# Patient Record
Sex: FEMALE | Race: WHITE | NOT HISPANIC OR LATINO | URBAN - METROPOLITAN AREA
[De-identification: names, ages, dates, MRNs, and addresses within clinical notes are randomized per-mention and may not be internally consistent; named-entity substitution may affect disease eponyms.]

---

## 2017-01-03 ENCOUNTER — OFFICE VISIT (OUTPATIENT)
Dept: INTERNAL MEDICINE | Facility: CLINIC | Age: 52
End: 2017-01-03

## 2017-01-03 ENCOUNTER — TELEPHONE (OUTPATIENT)
Dept: INTERNAL MEDICINE | Facility: CLINIC | Age: 52
End: 2017-01-03

## 2017-01-03 ENCOUNTER — APPOINTMENT (OUTPATIENT)
Dept: WOMENS IMAGING | Facility: HOSPITAL | Age: 52
End: 2017-01-03

## 2017-01-03 VITALS
WEIGHT: 173 LBS | TEMPERATURE: 98.9 F | SYSTOLIC BLOOD PRESSURE: 122 MMHG | BODY MASS INDEX: 31.83 KG/M2 | OXYGEN SATURATION: 97 % | DIASTOLIC BLOOD PRESSURE: 78 MMHG | HEART RATE: 79 BPM | HEIGHT: 62 IN

## 2017-01-03 DIAGNOSIS — J20.9 ACUTE BRONCHITIS, UNSPECIFIED ORGANISM: ICD-10-CM

## 2017-01-03 DIAGNOSIS — R05.9 COUGH: Primary | ICD-10-CM

## 2017-01-03 PROCEDURE — 71020 XR CHEST 2 VW: CPT | Performed by: NURSE PRACTITIONER

## 2017-01-03 PROCEDURE — 71020 CHG CHEST X-RAY 2 VW: CPT | Performed by: RADIOLOGY

## 2017-01-03 PROCEDURE — 99213 OFFICE O/P EST LOW 20 MIN: CPT | Performed by: NURSE PRACTITIONER

## 2017-01-03 RX ORDER — PROMETHAZINE HYDROCHLORIDE AND CODEINE PHOSPHATE 6.25; 1 MG/5ML; MG/5ML
5 SYRUP ORAL EVERY 4 HOURS PRN
Qty: 118 ML | Refills: 0 | Status: SHIPPED | OUTPATIENT
Start: 2017-01-03 | End: 2017-01-08

## 2017-01-03 RX ORDER — AZITHROMYCIN 250 MG/1
TABLET, FILM COATED ORAL
Qty: 6 TABLET | Refills: 0 | Status: SHIPPED | OUTPATIENT
Start: 2017-01-03 | End: 2017-02-14

## 2017-01-03 RX ORDER — GUAIFENESIN 600 MG/1
1200 TABLET, EXTENDED RELEASE ORAL 2 TIMES DAILY
Qty: 14 TABLET
Start: 2017-01-03 | End: 2017-01-10

## 2017-01-03 NOTE — MR AVS SNAPSHOT
Josefa Nazario   1/3/2017 9:15 AM   Office Visit    Dept Phone:  [phone]   Encounter #:  [id number]    Provider:  EVELINA Smith   Department:  [facility] INTERNAL MEDICINE                Your Full Care Plan              Today's Medication Changes          These changes are accurate as of: 1/3/17 10:08 AM.  If you have any questions, ask your nurse or doctor.               New Medication(s)Ordered:     azithromycin 250 MG tablet   Commonly known as:  ZITHROMAX Z-SEVERO   Take 2 tablets the first day, then 1 tablet daily for 4 days.   Started by:  EVELINA Smith       guaiFENesin 600 MG 12 hr tablet   Commonly known as:  MUCINEX   Take 2 tablets by mouth 2 (Two) Times a Day for 7 days.   Started by:  EVELINA Smith       promethazine-codeine 6.25-10 MG/5ML syrup   Commonly known as:  PHENERGAN with CODEINE   Take 5 mL by mouth Every 4 (Four) Hours As Needed for cough for up to 5 days.   Started by:  EVELINA Smith         Medication(s)that have changed:     escitalopram 20 MG tablet   Commonly known as:  LEXAPRO   Take 1 tablet by mouth daily.   What changed:  Another medication with the same name was removed. Continue taking this medication, and follow the directions you see here.   Changed by:  Flora Hillman MA         Stop taking medication(s)listed here:     triamcinolone 0.1 % cream   Commonly known as:  KENALOG   Stopped by:  EVELINA Smith           VANIQA 13.9 % cream   Generic drug:  Eflornithine HCl   Stopped by:  EVELINA Smith                Where to Get Your Medications      These medications were sent to [address] [facility] [address] [facility] [address] [facility] [address] - [phone]  - [phone]   [address] [facility] [address]     Phone:  [phone]     azithromycin 250 MG tablet         You can get these medications from any pharmacy     Bring a paper prescription  for each of these medications     promethazine-codeine 6.25-10 MG/5ML syrup         Information about where to get these medications is not yet available     ! Ask your nurse or doctor about these medications     guaiFENesin 600 MG 12 hr tablet                  Your Updated Medication List          This list is accurate as of: 1/3/17 10:08 AM.  Always use your most recent med list.                amphetamine-dextroamphetamine XR 15 MG 24 hr capsule   Commonly known as:  ADDERALL XR   Take 1 capsule by mouth Daily.       azithromycin 250 MG tablet   Commonly known as:  ZITHROMAX Z-SEVERO   Take 2 tablets the first day, then 1 tablet daily for 4 days.       escitalopram 20 MG tablet   Commonly known as:  LEXAPRO   Take 1 tablet by mouth daily.       guaiFENesin 600 MG 12 hr tablet   Commonly known as:  MUCINEX   Take 2 tablets by mouth 2 (Two) Times a Day for 7 days.       promethazine-codeine 6.25-10 MG/5ML syrup   Commonly known as:  PHENERGAN with CODEINE   Take 5 mL by mouth Every 4 (Four) Hours As Needed for cough for up to 5 days.       SYNTHROID 137 MCG tablet   Generic drug:  levothyroxine   TAKE 1 TABLET BY MOUTH DAILY.               We Performed the Following     XR Chest 2 View       You Were Diagnosed With        Codes Comments    Cough    -  Primary ICD-10-CM: R05  ICD-9-CM: 786.2     Acute bronchitis, unspecified organism     ICD-10-CM: J20.9  ICD-9-CM: 466.0       Instructions     None    Patient Instructions History      Upcoming Appointments     Visit Type Date Time Department    ACUTE           1/3/2017  9:15 AM [employer]    FOLLOW UP 1/17/2017  9:00 AM [employer] Signup     [employer] allows you to send messages to your doctor, view your test results, renew your prescriptions, schedule appointments, and more. To sign up, go to [employer] and click on the Sign Up Now link in the New User? box. Enter your [employer] Activation Code exactly as it appears below  "along with the last four digits of your Social Security Number and your Date of Birth () to complete the sign-up process. If you do not sign up before the expiration date, you must request a new code.    [employer] Activation Code: [email or web address]  Expires: 2017 10:07 AM    If you have questions, you can email [email or web address] or call [phone] to talk to our [employer] staff. Remember, [employer] is NOT to be used for urgent needs. For medical emergencies, dial 911.               Other Info from Your Visit           Your Appointments     2017  9:00 AM EST   Follow Up with Julia Rodriguez MD   [facility] INTERNAL MEDICINE (--)    [address] [place] [id number] [phone]           Arrive 15 minutes prior to appointment.              Allergies     No Known Allergies      Reason for Visit     URI x 2 days    Cough           Vital Signs     Blood Pressure Pulse Temperature Height Weight Last Menstrual Period    122/78 (BP Location: Left arm, Patient Position: Sitting, Cuff Size: Adult) 79 98.9 °F (37.2 °C) (Oral) 62\" (157.5 cm) 173 lb (78.5 kg) 2016    Oxygen Saturation Body Mass Index Smoking Status             97% 31.64 kg/m2 Never Smoker         Problems and Diagnoses Noted     Cough    -  Primary    Acute bronchitis          Results     XR Chest 2 View               "

## 2017-01-03 NOTE — PROGRESS NOTES
Subjective   Josefa Nazario is a 51 y.o. female who presents due to respiratory symptoms.    URI    The current episode started in the past 7 days. The problem has been gradually worsening. There has been no fever. Associated symptoms include chest pain, congestion, coughing, headaches, rhinorrhea, sinus pain, sneezing and a sore throat. Pertinent negatives include no abdominal pain, diarrhea, dysuria, ear pain, nausea, vomiting or wheezing. Treatments tried: Nyquil, Robitussin DM. The treatment provided mild relief.   Cough   The cough is non-productive. Associated symptoms include chest pain, headaches, postnasal drip, rhinorrhea and a sore throat. Pertinent negatives include no chills, ear pain, fever, shortness of breath or wheezing.        The following portions of the patient's history were reviewed and updated as appropriate: allergies, current medications, past social history and problem list.    Past Medical History   Diagnosis Date   • Anxiety    • Attention deficit disorder    • Encounter for medication management    • Heart murmur    • Hypothyroidism    • Low back pain          Current Outpatient Prescriptions:   •  amphetamine-dextroamphetamine XR (ADDERALL XR) 15 MG 24 hr capsule, Take 1 capsule by mouth Daily., Disp: 30 capsule, Rfl: 0  •  escitalopram (LEXAPRO) 20 MG tablet, Take 1 tablet by mouth daily., Disp: 30 tablet, Rfl: 5  •  SYNTHROID 137 MCG tablet, TAKE 1 TABLET BY MOUTH DAILY., Disp: 90 tablet, Rfl: 1  •  azithromycin (ZITHROMAX Z-SEVERO) 250 MG tablet, Take 2 tablets the first day, then 1 tablet daily for 4 days., Disp: 6 tablet, Rfl: 0  •  guaiFENesin (MUCINEX) 600 MG 12 hr tablet, Take 2 tablets by mouth 2 (Two) Times a Day for 7 days., Disp: 14 tablet, Rfl:   •  promethazine-codeine (PHENERGAN with CODEINE) 6.25-10 MG/5ML syrup, Take 5 mL by mouth Every 4 (Four) Hours As Needed for cough for up to 5 days., Disp: 118 mL, Rfl: 0    No Known Allergies    Review of Systems  "  Constitutional: Positive for fatigue. Negative for activity change, appetite change, chills, fever and unexpected weight change.   HENT: Positive for congestion, postnasal drip, rhinorrhea, sinus pressure, sneezing and sore throat. Negative for drooling, ear pain, facial swelling, hearing loss, mouth sores, nosebleeds, trouble swallowing and voice change.    Eyes: Negative for pain, discharge, itching and visual disturbance.   Respiratory: Positive for cough and chest tightness. Negative for choking, shortness of breath and wheezing.    Cardiovascular: Positive for chest pain. Negative for palpitations.   Gastrointestinal: Negative for abdominal pain, constipation, diarrhea, nausea and vomiting.   Endocrine: Negative for polyuria.   Genitourinary: Negative for dysuria and frequency.   Musculoskeletal: Negative for back pain and joint swelling.   Neurological: Positive for headaches.       Objective   Vitals:    01/03/17 0904   BP: 122/78   BP Location: Left arm   Patient Position: Sitting   Cuff Size: Adult   Pulse: 79   Temp: 98.9 °F (37.2 °C)   TempSrc: Oral   SpO2: 97%   Weight: 173 lb (78.5 kg)   Height: 62\" (157.5 cm)     Physical Exam   Constitutional: She appears well-developed and well-nourished. She is cooperative. She does not have a sickly appearance. She does not appear ill.   HENT:   Head: Normocephalic.   Right Ear: Hearing and external ear normal. No drainage, swelling or tenderness. Tympanic membrane is bulging. Tympanic membrane is not erythematous. No middle ear effusion. No decreased hearing is noted.   Left Ear: Hearing and external ear normal. No drainage, swelling or tenderness. Tympanic membrane is bulging. Tympanic membrane is not erythematous.  No middle ear effusion. No decreased hearing is noted.   Nose: Nose normal. No mucosal edema, rhinorrhea, sinus tenderness or nasal deformity. Right sinus exhibits no maxillary sinus tenderness and no frontal sinus tenderness. Left sinus exhibits no " maxillary sinus tenderness and no frontal sinus tenderness.   Mouth/Throat: Mucous membranes are normal. Posterior oropharyngeal erythema present.   Eyes: Conjunctivae and lids are normal.   Neck: Trachea normal.   Cardiovascular: Regular rhythm, normal heart sounds and normal pulses.    No murmur heard.  Pulmonary/Chest: No respiratory distress. She has no decreased breath sounds. She has wheezes. She has no rhonchi. She has no rales.   +expiratory wheezes throughout lung fields   Lymphadenopathy:     She has no cervical adenopathy.   Neurological: She is alert.   Skin: Skin is warm, dry and intact.   Nursing note and vitals reviewed.      Assessment/Plan   Josefa was seen today for uri and cough.    Diagnoses and all orders for this visit:    Cough  Comments:  no acute abnl on CXR, will send for review. Will treat for bronchitis and continue to monitor sx  Orders:  -     XR Chest 2 View    Acute bronchitis, unspecified organism  -     guaiFENesin (MUCINEX) 600 MG 12 hr tablet; Take 2 tablets by mouth 2 (Two) Times a Day for 7 days.  -     azithromycin (ZITHROMAX Z-SEVERO) 250 MG tablet; Take 2 tablets the first day, then 1 tablet daily for 4 days.  -     promethazine-codeine (PHENERGAN with CODEINE) 6.25-10 MG/5ML syrup; Take 5 mL by mouth Every 4 (Four) Hours As Needed for cough for up to 5 days.

## 2017-01-03 NOTE — TELEPHONE ENCOUNTER
----- Message from Mac Ibrahim sent at 1/3/2017 11:33 AM EST -----  Contact: pt  Pt calling, was in today. She was prescribed Phenergan ,z-mak and mucinex. Pt says she was supposed to be prescribed an antibiotic. Informed her that the z-mak is an antibiotic and was already sent into the pharmacy. Pt then says she thought she was supposed to have a steroid and an antibiotic. Pt wants to be sure all prescriptions were called in. Please advise.     #[phone]

## 2017-02-14 ENCOUNTER — OFFICE VISIT (OUTPATIENT)
Dept: INTERNAL MEDICINE | Facility: CLINIC | Age: 52
End: 2017-02-14

## 2017-02-14 VITALS
SYSTOLIC BLOOD PRESSURE: 120 MMHG | HEIGHT: 62 IN | WEIGHT: 171 LBS | RESPIRATION RATE: 14 BRPM | BODY MASS INDEX: 31.47 KG/M2 | DIASTOLIC BLOOD PRESSURE: 80 MMHG

## 2017-02-14 DIAGNOSIS — F98.8 ATTENTION DEFICIT DISORDER: ICD-10-CM

## 2017-02-14 DIAGNOSIS — E03.9 HYPOTHYROIDISM (ACQUIRED): Primary | ICD-10-CM

## 2017-02-14 LAB
ALBUMIN SERPL-MCNC: 3.7 G/DL (ref 3.4–4.6)
ALBUMIN/GLOB SERPL: 1.1 G/DL
ALP SERPL-CCNC: 84 U/L (ref 46–116)
ALT SERPL W P-5'-P-CCNC: 25 U/L (ref 14–59)
ANION GAP SERPL CALCULATED.3IONS-SCNC: 9 MMOL/L
AST SERPL-CCNC: 22 U/L (ref 7–37)
BASOPHILS # BLD AUTO: 0.02 10*3/MM3 (ref 0–0.2)
BASOPHILS NFR BLD AUTO: 0.3 % (ref 0–2)
BILIRUB SERPL-MCNC: 0.4 MG/DL (ref 0.2–1)
BUN BLD-MCNC: 7 MG/DL (ref 6–22)
BUN/CREAT SERPL: 10 (ref 7–25)
CALCIUM SPEC-SCNC: 8.7 MG/DL (ref 8.6–10.5)
CHLORIDE SERPL-SCNC: 104 MMOL/L (ref 95–107)
CHOLEST SERPL-MCNC: 215 MG/DL (ref 0–200)
CO2 SERPL-SCNC: 27 MMOL/L (ref 23–32)
CREAT BLD-MCNC: 0.7 MG/DL (ref 0.55–1.02)
DEPRECATED RDW RBC AUTO: 41.7 FL (ref 37–54)
EOSINOPHIL # BLD AUTO: 0.1 10*3/MM3 (ref 0–0.7)
EOSINOPHIL NFR BLD AUTO: 1.6 % (ref 0–5)
ERYTHROCYTE [DISTWIDTH] IN BLOOD BY AUTOMATED COUNT: 12.7 % (ref 11.5–15)
GFR SERPL CREATININE-BSD FRML MDRD: 88 ML/MIN/1.73
GLOBULIN UR ELPH-MCNC: 3.3 GM/DL
GLUCOSE BLD-MCNC: 83 MG/DL (ref 70–100)
HCT VFR BLD AUTO: 36.9 % (ref 34.1–44.9)
HDLC SERPL-MCNC: 61 MG/DL (ref 40–81)
HGB BLD-MCNC: 12 G/DL (ref 11.2–15.7)
LDLC SERPL CALC-MCNC: 128 MG/DL (ref 0–100)
LDLC/HDLC SERPL: 2.1 {RATIO}
LYMPHOCYTES # BLD AUTO: 1.83 10*3/MM3 (ref 0.8–7)
LYMPHOCYTES NFR BLD AUTO: 29.5 % (ref 10–60)
MCH RBC QN AUTO: 29.6 PG (ref 26–34)
MCHC RBC AUTO-ENTMCNC: 32.5 G/DL (ref 31–37)
MCV RBC AUTO: 90.9 FL (ref 80–100)
MONOCYTES # BLD AUTO: 0.47 10*3/MM3 (ref 0–1)
MONOCYTES NFR BLD AUTO: 7.6 % (ref 0–13)
NEUTROPHILS # BLD AUTO: 3.78 10*3/MM3 (ref 1–11)
NEUTROPHILS NFR BLD AUTO: 61 % (ref 30–85)
PLATELET # BLD AUTO: 205 10*3/MM3 (ref 150–450)
PMV BLD AUTO: 11.5 FL (ref 6–12)
POTASSIUM BLD-SCNC: 4.6 MMOL/L (ref 3.3–5.3)
PROT SERPL-MCNC: 7 G/DL (ref 6.3–8.4)
RBC # BLD AUTO: 4.06 10*6/MM3 (ref 3.93–5.22)
SODIUM BLD-SCNC: 140 MMOL/L (ref 136–145)
T4 FREE SERPL-MCNC: 0.86 NG/DL (ref 0.93–1.7)
TRIGL SERPL-MCNC: 128 MG/DL (ref 0–150)
TSH SERPL DL<=0.05 MIU/L-ACNC: 3.21 MIU/ML (ref 0.4–4.2)
VLDLC SERPL-MCNC: 25.6 MG/DL
WBC NRBC COR # BLD: 6.2 10*3/MM3 (ref 5–10)

## 2017-02-14 PROCEDURE — 80053 COMPREHEN METABOLIC PANEL: CPT | Performed by: INTERNAL MEDICINE

## 2017-02-14 PROCEDURE — 80061 LIPID PANEL: CPT | Performed by: INTERNAL MEDICINE

## 2017-02-14 PROCEDURE — 84443 ASSAY THYROID STIM HORMONE: CPT | Performed by: INTERNAL MEDICINE

## 2017-02-14 PROCEDURE — 85025 COMPLETE CBC W/AUTO DIFF WBC: CPT | Performed by: INTERNAL MEDICINE

## 2017-02-14 PROCEDURE — 99213 OFFICE O/P EST LOW 20 MIN: CPT | Performed by: INTERNAL MEDICINE

## 2017-02-14 PROCEDURE — 36415 COLL VENOUS BLD VENIPUNCTURE: CPT | Performed by: INTERNAL MEDICINE

## 2017-02-14 RX ORDER — DEXTROAMPHETAMINE SACCHARATE, AMPHETAMINE ASPARTATE MONOHYDRATE, DEXTROAMPHETAMINE SULFATE AND AMPHETAMINE SULFATE 5; 5; 5; 5 MG/1; MG/1; MG/1; MG/1
20 CAPSULE, EXTENDED RELEASE ORAL EVERY MORNING
Qty: 30 CAPSULE | Refills: 0 | Status: SHIPPED | OUTPATIENT
Start: 2017-02-14 | End: 2017-03-24 | Stop reason: SDUPTHER

## 2017-02-14 NOTE — PROGRESS NOTES
"Chief Complaint   Patient presents with   • Hypothyroidism     4 month check up    • Med Refill     needs adderall        Subjective   Josefa Nazario is a 51 y.o. female     Hypothyroidism   Pertinent negatives include no chest pain, coughing or headaches.   :      Hypothyroidism: Current treatment levothyroxine. She is compliant with the medication, tolerates it well and reports good control of symptoms of hypothyroidism.  She has not noted any change in tolerance of heat or cold.  No change in hair or skin.  No constipation.  No symptoms of hyperthyroidism.      ADD:  She continues to take adderall xr.  She feels it may wear out by the end of a long day and so she has been taking it between 10 and 12.      The following portions of the patient's history were reviewed and updated as appropriate: allergies, current medications, past social history, problem list, past surgical history    Review of Systems   Constitutional: Negative for activity change, appetite change and unexpected weight change.   HENT: Negative for nosebleeds.    Eyes: Negative for visual disturbance.   Respiratory: Negative for cough and shortness of breath.    Cardiovascular: Negative for chest pain, palpitations and leg swelling.   Neurological: Negative for headaches.   Psychiatric/Behavioral: Positive for sleep disturbance.       Visit Vitals   • /80 (BP Location: Right arm, Patient Position: Sitting, Cuff Size: Adult)   • Resp 14   • Ht 62\" (157.5 cm)   • Wt 171 lb (77.6 kg)   • BMI 31.28 kg/m2        Objective   Physical Exam   Constitutional: She is oriented to person, place, and time. She appears well-developed and well-nourished. No distress.   Neck: Normal carotid pulses present. Carotid bruit is not present.   Cardiovascular: Normal rate, regular rhythm, S1 normal, S2 normal and intact distal pulses.  Exam reveals no gallop and no friction rub.    No murmur heard.  Pulses:       Carotid pulses are 2+ on the right side, and 2+ " on the left side.  Pulmonary/Chest: Effort normal and breath sounds normal. No respiratory distress. She has no wheezes. She has no rales. Chest wall is not dull to percussion.   Musculoskeletal: She exhibits no edema.   Neurological: She is alert and oriented to person, place, and time.   Skin: Skin is warm and dry.   Psychiatric: Her speech is rapid and/or pressured.   Nursing note and vitals reviewed.      Assessment/Plan   Problem List Items Addressed This Visit        Endocrine    Hypothyroidism (acquired) - Primary    Relevant Orders    TSH (Completed)    T3, Free    T4, Free    Comprehensive Metabolic Panel (Completed)    CBC & Differential (Completed)    Lipid Panel (Completed)    CBC Auto Differential (Completed)       Other    Attention deficit disorder    Relevant Medications    amphetamine-dextroamphetamine XR (ADDERALL XR) 20 MG 24 hr capsule

## 2017-02-15 LAB — T3FREE SERPL-MCNC: 2.6 PG/ML (ref 2–4.4)

## 2017-02-23 DIAGNOSIS — M54.42 LEFT-SIDED LOW BACK PAIN WITH LEFT-SIDED SCIATICA: ICD-10-CM

## 2017-02-23 DIAGNOSIS — E03.9 HYPOTHYROIDISM: ICD-10-CM

## 2017-02-23 RX ORDER — MELOXICAM 15 MG/1
15 TABLET ORAL DAILY
Qty: 30 TABLET | Refills: 6 | Status: SHIPPED | OUTPATIENT
Start: 2017-02-23 | End: 2018-10-07 | Stop reason: SDUPTHER

## 2017-02-23 RX ORDER — LEVOTHYROXINE SODIUM 137 MCG
TABLET ORAL
Qty: 90 TABLET | Refills: 1 | Status: SHIPPED | OUTPATIENT
Start: 2017-02-23 | End: 2017-08-20 | Stop reason: SDUPTHER

## 2017-03-24 RX ORDER — DEXTROAMPHETAMINE SACCHARATE, AMPHETAMINE ASPARTATE MONOHYDRATE, DEXTROAMPHETAMINE SULFATE AND AMPHETAMINE SULFATE 5; 5; 5; 5 MG/1; MG/1; MG/1; MG/1
20 CAPSULE, EXTENDED RELEASE ORAL EVERY MORNING
Qty: 30 CAPSULE | Refills: 0 | Status: SHIPPED | OUTPATIENT
Start: 2017-03-24 | End: 2017-05-17 | Stop reason: SDUPTHER

## 2017-03-24 NOTE — TELEPHONE ENCOUNTER
----- Message from Suzanne Espinal sent at 3/24/2017  2:13 PM EDT -----  Contact: Patient  Patient called needing refill on     amphetamine-dextroamphetamine XR (ADDERALL XR) 20 MG 24 hr capsule    Please call when ready to be picked up.      Patient:  [phone]    Thanks.

## 2017-03-27 ENCOUNTER — TELEPHONE (OUTPATIENT)
Dept: INTERNAL MEDICINE | Facility: CLINIC | Age: 52
End: 2017-03-27

## 2017-03-30 ENCOUNTER — OFFICE VISIT (OUTPATIENT)
Dept: INTERNAL MEDICINE | Facility: CLINIC | Age: 52
End: 2017-03-30

## 2017-03-30 VITALS
SYSTOLIC BLOOD PRESSURE: 108 MMHG | BODY MASS INDEX: 30.55 KG/M2 | DIASTOLIC BLOOD PRESSURE: 82 MMHG | HEIGHT: 62 IN | OXYGEN SATURATION: 96 % | WEIGHT: 166 LBS | HEART RATE: 76 BPM

## 2017-03-30 DIAGNOSIS — J20.9 ACUTE BRONCHITIS, UNSPECIFIED ORGANISM: Primary | ICD-10-CM

## 2017-03-30 DIAGNOSIS — B27.90 INFECTIOUS MONONUCLEOSIS WITHOUT COMPLICATION, INFECTIOUS MONONUCLEOSIS DUE TO UNSPECIFIED ORGANISM: ICD-10-CM

## 2017-03-30 LAB
EXPIRATION DATE: NORMAL
HETEROPH AB SER QL LA: POSITIVE
INTERNAL CONTROL: NORMAL
Lab: NORMAL
S PYO AG THROAT QL: NEGATIVE

## 2017-03-30 PROCEDURE — 86308 HETEROPHILE ANTIBODY SCREEN: CPT | Performed by: INTERNAL MEDICINE

## 2017-03-30 PROCEDURE — 87880 STREP A ASSAY W/OPTIC: CPT | Performed by: INTERNAL MEDICINE

## 2017-03-30 PROCEDURE — 99214 OFFICE O/P EST MOD 30 MIN: CPT | Performed by: INTERNAL MEDICINE

## 2017-03-30 NOTE — PROGRESS NOTES
Subjective   Josefa Nazario is a 51 y.o. female.     Sore Throat    This is a new problem. The current episode started in the past 7 days. Associated symptoms include coughing ( Went to [facility] Tues & was told bronchiti Strated on levaquin & tessalon Low grade fever a couple days ago but none now) and shortness of breath.        The following portions of the patient's history were reviewed and updated as appropriate: allergies, current medications, past family history, past medical history, past social history, past surgical history and problem list.    Review of Systems   HENT: Positive for sore throat ( sore throat has gotten wors over past several day  Can hardly swallow now).    Respiratory: Positive for cough ( Went to [facility] Tues & was told bronchiti Strated on levaquin & tessalon Low grade fever a couple days ago but none now), shortness of breath and wheezing.    Cardiovascular: Negative for chest pain and palpitations.   Gastrointestinal: Negative.    Genitourinary: Negative.        Objective   Physical Exam   Constitutional: She appears well-developed.   HENT:   Head: Normocephalic.   Mouth/Throat: Oropharynx is clear and moist.   Tonsils edematous But no exudates or erythema   Eyes: EOM are normal.   Cardiovascular: Normal rate, regular rhythm and normal heart sounds.    Pulmonary/Chest: Effort normal and breath sounds normal.   Scattered rhonchi   Musculoskeletal: Normal range of motion.   Lymphadenopathy:     She has no cervical adenopathy.   Neurological: She is alert.   Vitals reviewed.      Assessment/Plan   Josefa was seen today for sore throat.    Diagnoses and all orders for this visit:    Acute bronchitis, unspecified organism  -     POCT rapid strep A  -     Mononucleosis Screen; Future  -     Mononucleosis Screen    Infectious mononucleosis without complication, infectious mononucleosis due to unspecified organism    Advised on sympyomatic care

## 2017-04-14 ENCOUNTER — OFFICE VISIT (OUTPATIENT)
Dept: INTERNAL MEDICINE | Facility: CLINIC | Age: 52
End: 2017-04-14

## 2017-04-14 ENCOUNTER — APPOINTMENT (OUTPATIENT)
Dept: WOMENS IMAGING | Facility: HOSPITAL | Age: 52
End: 2017-04-14

## 2017-04-14 VITALS
DIASTOLIC BLOOD PRESSURE: 74 MMHG | SYSTOLIC BLOOD PRESSURE: 112 MMHG | WEIGHT: 168 LBS | BODY MASS INDEX: 30.91 KG/M2 | RESPIRATION RATE: 14 BRPM | HEIGHT: 62 IN

## 2017-04-14 DIAGNOSIS — B27.90 INFECTIOUS MONONUCLEOSIS WITHOUT COMPLICATION, INFECTIOUS MONONUCLEOSIS DUE TO UNSPECIFIED ORGANISM: ICD-10-CM

## 2017-04-14 DIAGNOSIS — R05.9 COUGH: Primary | ICD-10-CM

## 2017-04-14 PROCEDURE — 99213 OFFICE O/P EST LOW 20 MIN: CPT | Performed by: INTERNAL MEDICINE

## 2017-04-14 PROCEDURE — 71020 XR CHEST PA AND LATERAL: CPT | Performed by: INTERNAL MEDICINE

## 2017-04-14 PROCEDURE — 71020 CHG CHEST X-RAY 2 VW: CPT | Performed by: RADIOLOGY

## 2017-04-14 RX ORDER — DEXTROMETHORPHAN HYDROBROMIDE AND PROMETHAZINE HYDROCHLORIDE 15; 6.25 MG/5ML; MG/5ML
5 SYRUP ORAL 4 TIMES DAILY PRN
Qty: 189 ML | Refills: 0 | Status: SHIPPED | OUTPATIENT
Start: 2017-04-14 | End: 2017-06-25

## 2017-04-14 NOTE — PROGRESS NOTES
"Chief Complaint   Patient presents with   • Follow-up     2 week follow up patient tested positive for mono   • Cough     patient still coughing , urgent told patient she needs repeat chest xray today         Subjective   Josefa Nazario is a 51 y.o. female     HPI: She was seen here about 2 weeks ago for a severe sore throat, cough.  Diagnosed with mono.  Her sore throat has now resolved.  She still has a cough.  She has some fatigue.  She's had a sharp discomfort off and on in the left flank area.  Her appetite is improving.  She has no fevers, chills, sweats.  She has a headache off and on.  She takes Advil for this and it helps.  For her cough, she has been taking Mucinex DM and Delsym.  They help a little bit but she still has problems with a cough.  Prior to this, she went to an immediate care center for cough.  A chest x-ray showed blunting in the costophrenic angles.  She was told that she had fluid in her lungs and she needed to repeat the chest x-ray.         The following portions of the patient's history were reviewed and updated as appropriate: allergies, current medications, past social history, problem list, past surgical history    Review of Systems   Constitutional: Negative for appetite change and fever.   HENT: Negative for ear pain, postnasal drip, rhinorrhea and sinus pressure.    Respiratory: Positive for cough. Negative for shortness of breath and wheezing.    Cardiovascular: Negative for palpitations and leg swelling.   Gastrointestinal: Negative for abdominal pain, diarrhea, nausea and vomiting.   Hematological: Negative for adenopathy.       /74 (BP Location: Left arm, Patient Position: Sitting, Cuff Size: Adult)  Resp 14  Ht 62\" (157.5 cm)  Wt 168 lb (76.2 kg)  LMP 03/07/2017  BMI 30.73 kg/m2     Objective   Physical Exam   Constitutional: She is oriented to person, place, and time. She appears well-developed and well-nourished. No distress.   Neck: Normal carotid pulses " present. Carotid bruit is not present.   Cardiovascular: Normal rate, regular rhythm, S1 normal, S2 normal and intact distal pulses.  Exam reveals no gallop and no friction rub.    No murmur heard.  Pulses:       Carotid pulses are 2+ on the right side, and 2+ on the left side.  Pulmonary/Chest: Effort normal and breath sounds normal. No respiratory distress. She has no wheezes. She has no rhonchi. She has no rales. Chest wall is not dull to percussion.   Abdominal: Soft. Bowel sounds are normal. There is no hepatosplenomegaly. There is no tenderness. There is no rebound and no guarding.   Musculoskeletal: She exhibits no edema.   Lymphadenopathy:     She has no cervical adenopathy.   Neurological: She is alert and oriented to person, place, and time.   Skin: Skin is warm and dry.   Nursing note and vitals reviewed.      Assessment/Plan   Problem List Items Addressed This Visit     None      Visit Diagnoses     Cough    -  Primary    Relevant Orders    XR Chest PA & Lateral (Completed)    Infectious mononucleosis without complication, infectious mononucleosis due to unspecified organism        Her symptoms have improved significantly.          Chest x-ray reviewed.  No old films available for comparison.  Costophrenic angles appear sharp.  Sent for radiology over read.

## 2017-05-18 RX ORDER — DEXTROAMPHETAMINE SACCHARATE, AMPHETAMINE ASPARTATE MONOHYDRATE, DEXTROAMPHETAMINE SULFATE AND AMPHETAMINE SULFATE 5; 5; 5; 5 MG/1; MG/1; MG/1; MG/1
20 CAPSULE, EXTENDED RELEASE ORAL EVERY MORNING
Qty: 30 CAPSULE | Refills: 0 | Status: SHIPPED | OUTPATIENT
Start: 2017-05-18 | End: 2017-06-23 | Stop reason: SDUPTHER

## 2017-06-06 ENCOUNTER — APPOINTMENT (OUTPATIENT)
Dept: WOMENS IMAGING | Facility: HOSPITAL | Age: 52
End: 2017-06-06

## 2017-06-06 PROCEDURE — 77067 SCR MAMMO BI INCL CAD: CPT | Performed by: RADIOLOGY

## 2017-06-23 RX ORDER — DEXTROAMPHETAMINE SACCHARATE, AMPHETAMINE ASPARTATE MONOHYDRATE, DEXTROAMPHETAMINE SULFATE AND AMPHETAMINE SULFATE 5; 5; 5; 5 MG/1; MG/1; MG/1; MG/1
20 CAPSULE, EXTENDED RELEASE ORAL EVERY MORNING
Qty: 30 CAPSULE | Refills: 0 | Status: SHIPPED | OUTPATIENT
Start: 2017-06-23 | End: 2017-08-18 | Stop reason: SDUPTHER

## 2017-06-23 NOTE — TELEPHONE ENCOUNTER
----- Message from Katt Reed MA sent at 6/23/2017 11:50 AM EDT -----  Pt calling for refill    Adderall XR    lov 4/14  Next appt is not scheduled      Pt#[phone]

## 2017-06-27 ENCOUNTER — TELEPHONE (OUTPATIENT)
Dept: INTERNAL MEDICINE | Facility: CLINIC | Age: 52
End: 2017-06-27

## 2017-08-16 ENCOUNTER — TELEPHONE (OUTPATIENT)
Dept: INTERNAL MEDICINE | Facility: CLINIC | Age: 52
End: 2017-08-16

## 2017-08-16 NOTE — TELEPHONE ENCOUNTER
----- Message from Mac Ibrahim sent at 8/16/2017 10:10 AM EDT -----  Contact: pt  Pt calling would like refill on rx. Please call when ready for pickup.    amphetamine-dextroamphetamine XR (ADDERALL XR) 20 MG 24 hr capsule    Pt#[phone]

## 2017-08-18 ENCOUNTER — APPOINTMENT (OUTPATIENT)
Dept: WOMENS IMAGING | Facility: HOSPITAL | Age: 52
End: 2017-08-18

## 2017-08-18 ENCOUNTER — OFFICE VISIT (OUTPATIENT)
Dept: INTERNAL MEDICINE | Facility: CLINIC | Age: 52
End: 2017-08-18

## 2017-08-18 VITALS
BODY MASS INDEX: 31.83 KG/M2 | DIASTOLIC BLOOD PRESSURE: 70 MMHG | WEIGHT: 173 LBS | SYSTOLIC BLOOD PRESSURE: 110 MMHG | HEIGHT: 62 IN

## 2017-08-18 DIAGNOSIS — F98.8 ATTENTION DEFICIT DISORDER: Primary | ICD-10-CM

## 2017-08-18 DIAGNOSIS — M79.671 RIGHT FOOT PAIN: ICD-10-CM

## 2017-08-18 PROCEDURE — 73630 X-RAY EXAM OF FOOT: CPT | Performed by: INTERNAL MEDICINE

## 2017-08-18 PROCEDURE — 99213 OFFICE O/P EST LOW 20 MIN: CPT | Performed by: INTERNAL MEDICINE

## 2017-08-18 PROCEDURE — 73630 X-RAY EXAM OF FOOT: CPT | Performed by: RADIOLOGY

## 2017-08-18 RX ORDER — DEXTROAMPHETAMINE SACCHARATE, AMPHETAMINE ASPARTATE MONOHYDRATE, DEXTROAMPHETAMINE SULFATE AND AMPHETAMINE SULFATE 5; 5; 5; 5 MG/1; MG/1; MG/1; MG/1
20 CAPSULE, EXTENDED RELEASE ORAL EVERY MORNING
Qty: 30 CAPSULE | Refills: 0 | Status: SHIPPED | OUTPATIENT
Start: 2017-08-18 | End: 2017-09-21 | Stop reason: SDUPTHER

## 2017-08-18 NOTE — PROGRESS NOTES
"Chief Complaint   Patient presents with   • Follow-up     ADD        Subjective   Josefa Nazario is a 52 y.o. female     HPI: ADD:  She comes in for follow up of ADD.  This is a chronic problem.  Symptoms include easy distractibility, poor concentration, short attention span, forgetfulness.  This happens at work.  Sometimes also at home.  Symptoms are moderate in severity.  Symptoms are unchanged.  Exacerbating factors include fatigue, distracting activities.  Relieving factors include stimulant medication.  Associated symptoms negative for anorexia, weight loss, sleep disturbance, anxiety, irritability  Current treatment stimulant medication.By report, there is good compliance with treatment, good tolerance of treatment and good symptom control.         Hypothyroidism: Current treatment levothyroxine. She is compliant with the medication, tolerates it well and reports good control of symptoms of hypothyroidism.  She has not noted any change in tolerance of heat or cold.  No change in hair or skin.  No constipation.  No symptoms of hyperthyroidism.      aboiut 6 months ago, started to notice tenderness on the side of her right foot.      The following portions of the patient's history were reviewed and updated as appropriate: allergies, current medications, past social history, problem list, past surgical history    Review of Systems   Constitutional: Negative for activity change and appetite change.   HENT: Negative for nosebleeds.    Eyes: Negative for visual disturbance.   Respiratory: Negative for cough and shortness of breath.    Cardiovascular: Negative for chest pain, palpitations and leg swelling.   Neurological: Negative for headaches.   Psychiatric/Behavioral: Negative for sleep disturbance.           Objective     /70  Ht 62.2\" (158 cm)  Wt 173 lb (78.5 kg)  BMI 31.44 kg/m2     Physical Exam   Constitutional: She is oriented to person, place, and time. She appears well-developed and " well-nourished. No distress.   Neck: Normal carotid pulses present. Carotid bruit is not present.   Cardiovascular: Normal rate, regular rhythm, S1 normal, S2 normal and intact distal pulses.  Exam reveals no gallop and no friction rub.    No murmur heard.  Pulses:       Carotid pulses are 2+ on the right side, and 2+ on the left side.  Pulmonary/Chest: Effort normal and breath sounds normal. No respiratory distress. She has no wheezes. She has no rhonchi. She has no rales. Chest wall is not dull to percussion.   Musculoskeletal: She exhibits tenderness (right fifth metatarsal). She exhibits no edema.   Neurological: She is alert and oriented to person, place, and time.   Skin: Skin is warm and dry.   Nursing note and vitals reviewed.      Assessment/Plan   Problem List Items Addressed This Visit        Other    Attention deficit disorder - Primary    Relevant Medications    amphetamine-dextroamphetamine XR (ADDERALL XR) 20 MG 24 hr capsule      Other Visit Diagnoses     Right foot pain        Relevant Orders    XR Foot 3+ View Left (In Office) (Completed)    XR Foot 3+ View Right (In Office) (Completed)        Only the right foot is x-rayed today.  Films viewed.  No acute abnormality seen.  Sent out for over read.

## 2017-08-20 DIAGNOSIS — E03.9 HYPOTHYROIDISM: ICD-10-CM

## 2017-08-21 RX ORDER — LEVOTHYROXINE SODIUM 137 MCG
TABLET ORAL
Qty: 90 TABLET | Refills: 1 | Status: SHIPPED | OUTPATIENT
Start: 2017-08-21 | End: 2018-02-02

## 2017-08-31 ENCOUNTER — TELEPHONE (OUTPATIENT)
Dept: INTERNAL MEDICINE | Facility: CLINIC | Age: 52
End: 2017-08-31

## 2017-09-01 DIAGNOSIS — M79.671 RIGHT FOOT PAIN: Primary | ICD-10-CM

## 2017-09-21 NOTE — TELEPHONE ENCOUNTER
----- Message from Suzanne Espinal sent at 9/21/2017 10:56 AM EDT -----  Contact: Patient  Patient called requesting refill on     amphetamine-dextroamphetamine XR (ADDERALL XR) 20 MG 24 hr capsule    Please call when ready to be picked up.    Patient:  [phone]

## 2017-09-22 RX ORDER — DEXTROAMPHETAMINE SACCHARATE, AMPHETAMINE ASPARTATE MONOHYDRATE, DEXTROAMPHETAMINE SULFATE AND AMPHETAMINE SULFATE 5; 5; 5; 5 MG/1; MG/1; MG/1; MG/1
20 CAPSULE, EXTENDED RELEASE ORAL EVERY MORNING
Qty: 30 CAPSULE | Refills: 0 | Status: SHIPPED | OUTPATIENT
Start: 2017-09-22 | End: 2017-11-08 | Stop reason: SDUPTHER

## 2017-09-26 DIAGNOSIS — F41.9 ANXIETY: ICD-10-CM

## 2017-09-26 RX ORDER — ESCITALOPRAM OXALATE 20 MG/1
TABLET ORAL
Qty: 30 TABLET | Refills: 2 | Status: SHIPPED | OUTPATIENT
Start: 2017-09-26 | End: 2018-02-02

## 2017-11-08 RX ORDER — DEXTROAMPHETAMINE SACCHARATE, AMPHETAMINE ASPARTATE MONOHYDRATE, DEXTROAMPHETAMINE SULFATE AND AMPHETAMINE SULFATE 5; 5; 5; 5 MG/1; MG/1; MG/1; MG/1
20 CAPSULE, EXTENDED RELEASE ORAL EVERY MORNING
Qty: 30 CAPSULE | Refills: 0 | Status: SHIPPED | OUTPATIENT
Start: 2017-11-08 | End: 2018-01-22 | Stop reason: SDUPTHER

## 2017-11-08 NOTE — TELEPHONE ENCOUNTER
----- Message from Katt Vidales sent at 11/8/2017  2:36 PM EST -----  Contact: Patient  Patient called in needing refill for medication.  Please call when ready.    amphetamine-dextroamphetamine XR (ADDERALL XR) 20 MG 24 hr capsule    Pt# [phone]

## 2017-11-27 ENCOUNTER — TELEPHONE (OUTPATIENT)
Dept: INTERNAL MEDICINE | Facility: CLINIC | Age: 52
End: 2017-11-27

## 2017-11-27 NOTE — TELEPHONE ENCOUNTER
Tried the number given and PA has been approved. Informed patient that I have tried multiple numbers and wish I had this one sooner. Pharmacy has been informed as well med will be around 17 dollars.

## 2017-11-27 NOTE — TELEPHONE ENCOUNTER
----- Message from Suzanne Espinal sent at 11/27/2017  1:23 PM EST -----  Contact: Patient  Patient called stating there has been a problem getting her medication filled.  [facility] and has [facility] number to use to call in her    amphetamine-dextroamphetamine XR (ADDERALL XR) 20 MG 24 hr capsule.  Please advise.     Patient:  [phone] (H)    Pharmacy:  [facility]:  [phone]

## 2018-01-22 RX ORDER — DEXTROAMPHETAMINE SACCHARATE, AMPHETAMINE ASPARTATE MONOHYDRATE, DEXTROAMPHETAMINE SULFATE AND AMPHETAMINE SULFATE 5; 5; 5; 5 MG/1; MG/1; MG/1; MG/1
20 CAPSULE, EXTENDED RELEASE ORAL EVERY MORNING
Qty: 30 CAPSULE | Refills: 0 | Status: SHIPPED | OUTPATIENT
Start: 2018-01-22 | End: 2018-02-02 | Stop reason: SDUPTHER

## 2018-01-22 NOTE — TELEPHONE ENCOUNTER
----- Message from Margaret Bailon sent at 1/22/2018  8:30 AM EST -----  Contact: pt  Pt calling would like refill on     RX: amphetamine-dextroamphetamine XR (ADDERALL XR) 20 MG 24 hr capsule    Please call when ready for pickup      Pt#[phone]    Advised 2-3 days before refill is ready and WE WILL CALL

## 2018-02-02 ENCOUNTER — OFFICE VISIT (OUTPATIENT)
Dept: INTERNAL MEDICINE | Facility: CLINIC | Age: 53
End: 2018-02-02

## 2018-02-02 VITALS
SYSTOLIC BLOOD PRESSURE: 122 MMHG | DIASTOLIC BLOOD PRESSURE: 80 MMHG | HEIGHT: 62 IN | WEIGHT: 172 LBS | BODY MASS INDEX: 31.65 KG/M2

## 2018-02-02 DIAGNOSIS — E03.9 HYPOTHYROIDISM (ACQUIRED): Primary | ICD-10-CM

## 2018-02-02 DIAGNOSIS — F98.8 ATTENTION DEFICIT DISORDER, UNSPECIFIED HYPERACTIVITY PRESENCE: ICD-10-CM

## 2018-02-02 LAB
ALBUMIN SERPL-MCNC: 4.2 G/DL (ref 3.5–5.2)
ALBUMIN/GLOB SERPL: 1.1 G/DL
ALP SERPL-CCNC: 85 U/L (ref 39–117)
ALT SERPL W P-5'-P-CCNC: 20 U/L (ref 1–33)
ANION GAP SERPL CALCULATED.3IONS-SCNC: 11.2 MMOL/L
AST SERPL-CCNC: 23 U/L (ref 1–32)
BILIRUB SERPL-MCNC: 0.4 MG/DL (ref 0.1–1.2)
BUN BLD-MCNC: 9 MG/DL (ref 6–20)
BUN/CREAT SERPL: 11 (ref 7–25)
CALCIUM SPEC-SCNC: 9.2 MG/DL (ref 8.6–10.5)
CHLORIDE SERPL-SCNC: 100 MMOL/L (ref 98–107)
CHOLEST SERPL-MCNC: 258 MG/DL (ref 0–200)
CO2 SERPL-SCNC: 26.8 MMOL/L (ref 22–29)
CREAT BLD-MCNC: 0.82 MG/DL (ref 0.57–1)
DEPRECATED RDW RBC AUTO: 42.3 FL (ref 37–54)
ERYTHROCYTE [DISTWIDTH] IN BLOOD BY AUTOMATED COUNT: 12.6 % (ref 11.5–15)
GFR SERPL CREATININE-BSD FRML MDRD: 73 ML/MIN/1.73
GLOBULIN UR ELPH-MCNC: 3.9 GM/DL
GLUCOSE BLD-MCNC: 97 MG/DL (ref 65–99)
HCT VFR BLD AUTO: 40.1 % (ref 34.1–44.9)
HDLC SERPL-MCNC: 73 MG/DL (ref 40–60)
HGB BLD-MCNC: 13.1 G/DL (ref 11.2–15.7)
LDLC SERPL CALC-MCNC: 158 MG/DL (ref 0–100)
LDLC/HDLC SERPL: 2.17 {RATIO}
MCH RBC QN AUTO: 30.7 PG (ref 26–34)
MCHC RBC AUTO-ENTMCNC: 32.7 G/DL (ref 31–37)
MCV RBC AUTO: 93.9 FL (ref 80–100)
PLATELET # BLD AUTO: 228 10*3/MM3 (ref 150–450)
PMV BLD AUTO: 12 FL (ref 6–12)
POTASSIUM BLD-SCNC: 4.8 MMOL/L (ref 3.5–5.2)
PROT SERPL-MCNC: 8.1 G/DL (ref 6–8.5)
RBC # BLD AUTO: 4.27 10*6/MM3 (ref 3.93–5.22)
SODIUM BLD-SCNC: 138 MMOL/L (ref 136–145)
T4 FREE SERPL-MCNC: 0.44 NG/DL (ref 0.93–1.7)
TRIGL SERPL-MCNC: 134 MG/DL (ref 0–150)
TSH SERPL DL<=0.05 MIU/L-ACNC: 95.56 MIU/ML (ref 0.27–4.2)
VLDLC SERPL-MCNC: 26.8 MG/DL (ref 5–40)
WBC NRBC COR # BLD: 7.48 10*3/MM3 (ref 5–10)

## 2018-02-02 PROCEDURE — 84443 ASSAY THYROID STIM HORMONE: CPT | Performed by: INTERNAL MEDICINE

## 2018-02-02 PROCEDURE — 36415 COLL VENOUS BLD VENIPUNCTURE: CPT | Performed by: INTERNAL MEDICINE

## 2018-02-02 PROCEDURE — 80053 COMPREHEN METABOLIC PANEL: CPT | Performed by: INTERNAL MEDICINE

## 2018-02-02 PROCEDURE — 84439 ASSAY OF FREE THYROXINE: CPT | Performed by: INTERNAL MEDICINE

## 2018-02-02 PROCEDURE — 85027 COMPLETE CBC AUTOMATED: CPT | Performed by: INTERNAL MEDICINE

## 2018-02-02 PROCEDURE — 99213 OFFICE O/P EST LOW 20 MIN: CPT | Performed by: INTERNAL MEDICINE

## 2018-02-02 PROCEDURE — 80061 LIPID PANEL: CPT | Performed by: INTERNAL MEDICINE

## 2018-02-02 RX ORDER — LEVOTHYROXINE SODIUM 150 MCG
150 TABLET ORAL DAILY
Qty: 30 TABLET | Refills: 1 | Status: SHIPPED | OUTPATIENT
Start: 2018-02-02 | End: 2018-02-06 | Stop reason: SDUPTHER

## 2018-02-02 RX ORDER — DEXTROAMPHETAMINE SACCHARATE, AMPHETAMINE ASPARTATE MONOHYDRATE, DEXTROAMPHETAMINE SULFATE AND AMPHETAMINE SULFATE 5; 5; 5; 5 MG/1; MG/1; MG/1; MG/1
20 CAPSULE, EXTENDED RELEASE ORAL EVERY MORNING
Qty: 30 CAPSULE | Refills: 0 | Status: SHIPPED | OUTPATIENT
Start: 2018-02-02 | End: 2018-04-16 | Stop reason: SDUPTHER

## 2018-02-02 NOTE — PROGRESS NOTES
"Chief Complaint   Patient presents with   • ADD     follow up        Subjective   Josefa Nazario is a 52 y.o. female     HPI:   ADD:  She comes in for follow up of ADD.  This is a chronic problem.  Symptoms include easy distractibility, poor concentration, short attention span, forgetfulness.   Symptoms are moderate in severity.  Symptoms have improved with treatment.  Exacerbating factors include fatigue, distracting activities.  Relieving factors include stimulant medication.  Associated symptoms negative for anorexia, weight loss, sleep disturbance, anxiety, irritability  Current treatment stimulant medication.By report, there is good compliance with treatment, good tolerance of treatment and good symptom control.         Hypothyroidism: Current treatment levothyroxine. She is compliant with the medication, tolerates it well and reports good control of symptoms of hypothyroidism.  She has not noted any change in tolerance of heat or cold.  No change in hair or skin.  No constipation.  No symptoms of hyperthyroidism.      The following portions of the patient's history were reviewed and updated as appropriate: allergies, current medications, past social history, problem list, past surgical history    Review of Systems   Constitutional: Negative for activity change and appetite change.   HENT: Negative for nosebleeds.    Eyes: Negative for visual disturbance.   Respiratory: Negative for cough and shortness of breath.    Cardiovascular: Negative for chest pain, palpitations and leg swelling.   Neurological: Negative for headaches.   Psychiatric/Behavioral: Negative for sleep disturbance.           Objective     /80  Ht 157.5 cm (62\")  Wt 78 kg (172 lb)  BMI 31.46 kg/m2     Physical Exam   Constitutional: She is oriented to person, place, and time. She appears well-developed and well-nourished. No distress.   Neck: Normal carotid pulses present. Carotid bruit is not present.   Cardiovascular: Normal " rate, regular rhythm, S1 normal, S2 normal and intact distal pulses.  Exam reveals no gallop and no friction rub.    No murmur heard.  Pulses:       Carotid pulses are 2+ on the right side, and 2+ on the left side.  Pulmonary/Chest: Effort normal and breath sounds normal. No respiratory distress. She has no wheezes. She has no rhonchi. She has no rales. Chest wall is not dull to percussion.   Musculoskeletal: She exhibits no edema.   Neurological: She is alert and oriented to person, place, and time.   Skin: Skin is warm and dry.   Nursing note and vitals reviewed.      Assessment/Plan   Problem List Items Addressed This Visit        Endocrine    Hypothyroidism (acquired) - Primary    Relevant Orders    Comprehensive Metabolic Panel    CBC (No Diff) (Completed)    Lipid Panel    TSH Rfx On Abnormal To Free T4       Other    Attention deficit disorder

## 2018-02-06 RX ORDER — LEVOTHYROXINE SODIUM 150 MCG
150 TABLET ORAL DAILY
Qty: 30 TABLET | Refills: 1 | Status: SHIPPED | OUTPATIENT
Start: 2018-02-06 | End: 2019-12-09 | Stop reason: SDUPTHER

## 2018-04-12 RX ORDER — LEVOTHYROXINE SODIUM 150 MCG
TABLET ORAL
Qty: 30 TABLET | Refills: 5 | Status: SHIPPED | OUTPATIENT
Start: 2018-04-12 | End: 2018-06-12 | Stop reason: SDUPTHER

## 2018-04-16 RX ORDER — DEXTROAMPHETAMINE SACCHARATE, AMPHETAMINE ASPARTATE MONOHYDRATE, DEXTROAMPHETAMINE SULFATE AND AMPHETAMINE SULFATE 5; 5; 5; 5 MG/1; MG/1; MG/1; MG/1
20 CAPSULE, EXTENDED RELEASE ORAL EVERY MORNING
Qty: 30 CAPSULE | Refills: 0 | Status: SHIPPED | OUTPATIENT
Start: 2018-04-16 | End: 2018-05-22 | Stop reason: SDUPTHER

## 2018-04-16 NOTE — TELEPHONE ENCOUNTER
----- Message from Katarina Kirkland sent at 4/16/2018 10:55 AM EDT -----  Contact: pt - Dr Rodriguez' pt - RE: script  Pt calling and would like a refill on Rx      amphetamine-dextroamphetamine XR (ADDERALL XR) 20 MG 24 hr     Last OV  And refill 02/02/18  kely done 01/24  please review med refill and advise     Pt # [phone]

## 2018-05-22 RX ORDER — DEXTROAMPHETAMINE SACCHARATE, AMPHETAMINE ASPARTATE MONOHYDRATE, DEXTROAMPHETAMINE SULFATE AND AMPHETAMINE SULFATE 5; 5; 5; 5 MG/1; MG/1; MG/1; MG/1
20 CAPSULE, EXTENDED RELEASE ORAL EVERY MORNING
Qty: 30 CAPSULE | Refills: 0 | Status: SHIPPED | OUTPATIENT
Start: 2018-05-22 | End: 2018-07-10 | Stop reason: SDUPTHER

## 2018-05-22 NOTE — TELEPHONE ENCOUNTER
Patient requesting refill for Adderall XR 20 mg.   Last OV 2/2/18  Next OV 8/2/18  Last refill 4/18/18  VIOLET available

## 2018-06-12 ENCOUNTER — OFFICE VISIT (OUTPATIENT)
Dept: INTERNAL MEDICINE | Facility: CLINIC | Age: 53
End: 2018-06-12

## 2018-06-12 VITALS
SYSTOLIC BLOOD PRESSURE: 120 MMHG | OXYGEN SATURATION: 98 % | DIASTOLIC BLOOD PRESSURE: 80 MMHG | HEART RATE: 73 BPM | WEIGHT: 167 LBS | HEIGHT: 63 IN | BODY MASS INDEX: 29.59 KG/M2

## 2018-06-12 DIAGNOSIS — L23.7 CONTACT DERMATITIS DUE TO POISON IVY: Primary | ICD-10-CM

## 2018-06-12 PROCEDURE — 99213 OFFICE O/P EST LOW 20 MIN: CPT | Performed by: NURSE PRACTITIONER

## 2018-06-12 RX ORDER — BETAMETHASONE DIPROPIONATE 0.5 MG/G
CREAM TOPICAL DAILY
Qty: 15 G | Refills: 1 | Status: SHIPPED | OUTPATIENT
Start: 2018-06-12 | End: 2018-08-21

## 2018-06-12 RX ORDER — METHYLPREDNISOLONE 4 MG/1
TABLET ORAL
Qty: 21 TABLET | Refills: 0 | Status: SHIPPED | OUTPATIENT
Start: 2018-06-12 | End: 2018-08-21

## 2018-06-12 RX ORDER — HYDROXYZINE PAMOATE 25 MG/1
25 CAPSULE ORAL 3 TIMES DAILY PRN
Qty: 30 CAPSULE | Refills: 0 | Status: SHIPPED | OUTPATIENT
Start: 2018-06-12 | End: 2018-08-21

## 2018-06-12 NOTE — PATIENT INSTRUCTIONS
Poison Ivy Dermatitis  Poison ivy dermatitis is inflammation of the skin that is caused by the allergens on the leaves of the poison ivy plant. The skin reaction often involves redness, swelling, blisters, and extreme itching.  What are the causes?  This condition is caused by a specific chemical (urushiol) found in the sap of the poison ivy plant. This chemical is sticky and can be easily spread to people, animals, and objects. You can get poison ivy dermatitis by:  · Having direct contact with a poison ivy plant.  · Touching animals, other people, or objects that have come in contact with poison ivy and have the chemical on them.  What increases the risk?  This condition is more likely to develop in:  · People who are outdoors often.  · People who go outdoors without wearing protective clothing, such as closed shoes, long pants, and a long-sleeved shirt.  What are the signs or symptoms?  Symptoms of this condition include:  · Redness and itching.  · A rash that often includes bumps and blisters. The rash usually appears 48 hours after exposure.  · Swelling. This may occur if the reaction is more severe.  Symptoms usually last for 1-2 weeks. However, the first time you develop this condition, symptoms may last 3-4 weeks.  How is this diagnosed?  This condition may be diagnosed based on your symptoms and a physical exam. Your health care provider may also ask you about any recent outdoor activity.  How is this treated?  Treatment for this condition will vary depending on how severe it is. Treatment may include:  · Hydrocortisone creams or calamine lotions to relieve itching.  · Oatmeal baths to soothe the skin.  · Over-the-counter antihistamine tablets.  · Oral steroid medicine for more severe outbreaks.  Follow these instructions at home:  · Take or apply over-the-counter and prescription medicines only as told by your health care provider.  · Wash exposed skin as soon as possible with soap and cold water.  · Use  hydrocortisone creams or calamine lotion as needed to soothe the skin and relieve itching.  · Take oatmeal baths as needed. Use colloidal oatmeal. You can get this at your local pharmacy or grocery store. Follow the instructions on the packaging.  · Do not scratch or rub your skin.  · While you have the rash, wash clothes right after you wear them.  How is this prevented?  · Learn to identify the poison ivy plant and avoid contact with the plant. This plant can be recognized by the number of leaves. Generally, poison ivy has three leaves with flowering branches on a single stem. The leaves are typically glossy, and they have jagged edges that come to a point at the front.  · If you have been exposed to poison ivy, thoroughly wash with soap and water right away. You have about 30 minutes to remove the plant resin before it will cause the rash. Be sure to wash under your fingernails because any plant resin there will continue to spread the rash.  · When hiking or camping, wear clothes that will help you to avoid exposure on the skin. This includes long pants, a long-sleeved shirt, tall socks, and hiking boots. You can also apply preventive lotion to your skin to help limit exposure.  · If you suspect that your clothes or outdoor gear came in contact with poison ivy, rinse them off outside with a garden hose before you bring them inside your house.  Contact a health care provider if:  · You have open sores in the rash area.  · You have more redness, swelling, or pain in the affected area.  · You have redness that spreads beyond the rash area.  · You have fluid, blood, or pus coming from the affected area.  · You have a fever.  · You have a rash over a large area of your body.  · You have a rash on your eyes, mouth, or genitals.  · Your rash does not improve after a few days.  Get help right away if:  · Your face swells or your eyes swell shut.  · You have trouble breathing.  · You have trouble swallowing.  This  information is not intended to replace advice given to you by your health care provider. Make sure you discuss any questions you have with your health care provider.  Document Released: 12/15/2001 Document Revised: 05/25/2017 Document Reviewed: 05/25/2016  [employer] Interactive Patient Education © 2017 [employer]

## 2018-06-12 NOTE — PROGRESS NOTES
Subjective   Josefa Nazario is a 52 y.o. female.     She was outdoors doing yard work and started with rash shortly afterwards about approximately 2 weeks ago. She went to urgent care on 6/2/2018 and treated with oral steroid and topical steroid. She reports the oral steroid helped some, but topical steroid was ineffective. She reports the rash has continued to spread.       Poison Ivy   This is a new problem. The current episode started 1 to 4 weeks ago. The problem has been gradually worsening since onset. The affected locations include the left lower leg, right lower leg, right axilla, left arm, chest, left axilla and right arm (bilateral legs, arms, axillary). The rash is characterized by blistering, redness and itchiness. She was exposed to plant contact. Pertinent negatives include no cough, diarrhea, fever, rhinorrhea or shortness of breath. Treatments tried: oral steroids, topical steroid, otc medications, benadryl. The treatment provided mild relief.        The following portions of the patient's history were reviewed and updated as appropriate: allergies, current medications, past family history, past medical history, past social history, past surgical history and problem list.    Review of Systems   Constitutional: Negative for fever.   HENT: Negative for rhinorrhea.    Respiratory: Negative for cough and shortness of breath.    Cardiovascular: Negative for chest pain.   Gastrointestinal: Negative for diarrhea.   Skin: Positive for rash.       Objective   Physical Exam   Constitutional: She is oriented to person, place, and time. She appears well-developed and well-nourished.   HENT:   Head: Normocephalic.   Nose: Nose normal.   Cardiovascular: Regular rhythm and normal heart sounds.  Exam reveals no S3 and no S4.    No murmur heard.  Pulmonary/Chest: Effort normal and breath sounds normal. She has no decreased breath sounds. She has no wheezes. She has no rhonchi. She has no rales.   Musculoskeletal:  She exhibits no edema.   Neurological: She is alert and oriented to person, place, and time. Gait normal.   Skin: Skin is warm and dry. Rash noted. Rash is maculopapular and vesicular.   Linear vesicular rash noted to upper chest, bilateral axillary, upper arms, bilateral legs   Psychiatric: She has a normal mood and affect.       Assessment/Plan   Josefa was seen today for poison ivy.    Diagnoses and all orders for this visit:    Contact dermatitis due to poison ivy  -     hydrOXYzine (VISTARIL) 25 MG capsule; Take 1 capsule by mouth 3 (Three) Times a Day As Needed for Itching.  -     MethylPREDNISolone (MEDROL) 4 MG tablet; follow package directions  -     betamethasone dipropionate (DIPROLENE) 0.05 % cream; Apply  topically Daily.    She was given driving precautions with vistaril. She declines kenlog injection.

## 2018-07-10 NOTE — TELEPHONE ENCOUNTER
----- Message from Adriana Desir sent at 7/10/2018 11:48 AM EDT -----  Contact: Patient  Patient requesting refill on    amphetamine-dextroamphetamine XR (ADDERALL XR) 20 MG 24 hr capsule    Patient:[phone]

## 2018-07-11 RX ORDER — DEXTROAMPHETAMINE SACCHARATE, AMPHETAMINE ASPARTATE MONOHYDRATE, DEXTROAMPHETAMINE SULFATE AND AMPHETAMINE SULFATE 5; 5; 5; 5 MG/1; MG/1; MG/1; MG/1
20 CAPSULE, EXTENDED RELEASE ORAL EVERY MORNING
Qty: 30 CAPSULE | Refills: 0 | Status: SHIPPED | OUTPATIENT
Start: 2018-07-11 | End: 2018-08-21 | Stop reason: SDUPTHER

## 2018-08-21 ENCOUNTER — OFFICE VISIT (OUTPATIENT)
Dept: INTERNAL MEDICINE | Facility: CLINIC | Age: 53
End: 2018-08-21

## 2018-08-21 VITALS
DIASTOLIC BLOOD PRESSURE: 68 MMHG | BODY MASS INDEX: 29.95 KG/M2 | SYSTOLIC BLOOD PRESSURE: 106 MMHG | HEIGHT: 63 IN | WEIGHT: 169 LBS

## 2018-08-21 DIAGNOSIS — F90.9 ATTENTION DEFICIT HYPERACTIVITY DISORDER (ADHD), UNSPECIFIED ADHD TYPE: Primary | ICD-10-CM

## 2018-08-21 DIAGNOSIS — E03.9 HYPOTHYROIDISM (ACQUIRED): ICD-10-CM

## 2018-08-21 DIAGNOSIS — M72.2 PLANTAR FASCIITIS OF LEFT FOOT: ICD-10-CM

## 2018-08-21 PROCEDURE — 99213 OFFICE O/P EST LOW 20 MIN: CPT | Performed by: INTERNAL MEDICINE

## 2018-08-21 RX ORDER — DEXTROAMPHETAMINE SACCHARATE, AMPHETAMINE ASPARTATE MONOHYDRATE, DEXTROAMPHETAMINE SULFATE AND AMPHETAMINE SULFATE 5; 5; 5; 5 MG/1; MG/1; MG/1; MG/1
20 CAPSULE, EXTENDED RELEASE ORAL EVERY MORNING
Qty: 30 CAPSULE | Refills: 0 | Status: SHIPPED | OUTPATIENT
Start: 2018-08-21 | End: 2018-09-27 | Stop reason: SDUPTHER

## 2018-09-21 ENCOUNTER — APPOINTMENT (OUTPATIENT)
Dept: WOMENS IMAGING | Facility: HOSPITAL | Age: 53
End: 2018-09-21

## 2018-09-21 PROCEDURE — 77067 SCR MAMMO BI INCL CAD: CPT | Performed by: RADIOLOGY

## 2018-09-27 NOTE — TELEPHONE ENCOUNTER
----- Message from Adriana Desir sent at 9/27/2018  9:53 AM EDT -----  Contact: Patient  Patient requesting refill on    amphetamine-dextroamphetamine XR (ADDERALL XR) 20 MG 24 hr capsule    Last OV 08/21      Next OV 01/02/19    VIOLET done 05/22    please review med refill and advise     Patient:[phone]

## 2018-09-28 RX ORDER — DEXTROAMPHETAMINE SACCHARATE, AMPHETAMINE ASPARTATE MONOHYDRATE, DEXTROAMPHETAMINE SULFATE AND AMPHETAMINE SULFATE 5; 5; 5; 5 MG/1; MG/1; MG/1; MG/1
20 CAPSULE, EXTENDED RELEASE ORAL EVERY MORNING
Qty: 30 CAPSULE | Refills: 0 | Status: SHIPPED | OUTPATIENT
Start: 2018-09-28 | End: 2018-11-08 | Stop reason: SDUPTHER

## 2018-10-07 DIAGNOSIS — M54.42 LEFT-SIDED LOW BACK PAIN WITH LEFT-SIDED SCIATICA: ICD-10-CM

## 2018-10-08 RX ORDER — MELOXICAM 15 MG/1
TABLET ORAL
Qty: 30 TABLET | Refills: 2 | Status: SHIPPED | OUTPATIENT
Start: 2018-10-08 | End: 2020-05-14

## 2018-11-08 NOTE — TELEPHONE ENCOUNTER
----- Message from Margaret Bailon sent at 11/8/2018 11:19 AM EST -----  Contact: pt  Pt calling would like refill on     RX: amphetamine-dextroamphetamine XR (ADDERALL XR) 20 MG 24 hr capsule    Please call when ready for pickup      Pt#[phone]    Advised 2-3 days before refill is ready and WE WILL CALL

## 2018-11-09 RX ORDER — DEXTROAMPHETAMINE SACCHARATE, AMPHETAMINE ASPARTATE MONOHYDRATE, DEXTROAMPHETAMINE SULFATE AND AMPHETAMINE SULFATE 5; 5; 5; 5 MG/1; MG/1; MG/1; MG/1
20 CAPSULE, EXTENDED RELEASE ORAL EVERY MORNING
Qty: 30 CAPSULE | Refills: 0 | Status: SHIPPED | OUTPATIENT
Start: 2018-11-09 | End: 2018-12-20 | Stop reason: SDUPTHER

## 2018-12-10 RX ORDER — LEVOTHYROXINE SODIUM 150 MCG
TABLET ORAL
Qty: 30 TABLET | Refills: 4 | Status: SHIPPED | OUTPATIENT
Start: 2018-12-10 | End: 2019-03-06

## 2018-12-20 RX ORDER — DEXTROAMPHETAMINE SACCHARATE, AMPHETAMINE ASPARTATE MONOHYDRATE, DEXTROAMPHETAMINE SULFATE AND AMPHETAMINE SULFATE 5; 5; 5; 5 MG/1; MG/1; MG/1; MG/1
20 CAPSULE, EXTENDED RELEASE ORAL EVERY MORNING
Qty: 30 CAPSULE | Refills: 0 | Status: SHIPPED | OUTPATIENT
Start: 2018-12-20 | End: 2019-01-25 | Stop reason: SDUPTHER

## 2018-12-20 NOTE — TELEPHONE ENCOUNTER
----- Message from Katarina Kirkland sent at 12/20/2018 10:13 AM EST -----  Contact: pt - Dr Rodriguez' pt - RE: Rx refill  Pt calling and would like a refill on Rx      amphetamine-dextroamphetamine XR (ADDERALL XR) 20 MG 24 hr capsule 30 capsule  Sig - Route: Take 1 capsule by mouth Every Morning Earliest Fill Date: 11/9/18 - Or       Pt # [phone]

## 2019-01-25 RX ORDER — DEXTROAMPHETAMINE SACCHARATE, AMPHETAMINE ASPARTATE MONOHYDRATE, DEXTROAMPHETAMINE SULFATE AND AMPHETAMINE SULFATE 5; 5; 5; 5 MG/1; MG/1; MG/1; MG/1
20 CAPSULE, EXTENDED RELEASE ORAL EVERY MORNING
Qty: 30 CAPSULE | Refills: 0 | Status: SHIPPED | OUTPATIENT
Start: 2019-01-25 | End: 2019-03-27 | Stop reason: SDUPTHER

## 2019-01-25 NOTE — TELEPHONE ENCOUNTER
Last RF 12-  Last OV 8-21-18  Appt was canceled 1-2-19 but she was sick. No nxt apt in system  Reggie available

## 2019-01-25 NOTE — TELEPHONE ENCOUNTER
----- Message from Adriana Desir sent at 1/25/2019 11:33 AM EST -----  Contact: Patient  Patient requesting refill on    amphetamine-dextroamphetamine XR (ADDERALL XR) 20 MG 24 hr capsule    Patient:[phone]

## 2019-03-06 ENCOUNTER — OFFICE VISIT (OUTPATIENT)
Dept: INTERNAL MEDICINE | Facility: CLINIC | Age: 54
End: 2019-03-06

## 2019-03-06 VITALS
DIASTOLIC BLOOD PRESSURE: 80 MMHG | OXYGEN SATURATION: 95 % | WEIGHT: 168 LBS | BODY MASS INDEX: 29.77 KG/M2 | HEART RATE: 96 BPM | HEIGHT: 63 IN | SYSTOLIC BLOOD PRESSURE: 128 MMHG

## 2019-03-06 DIAGNOSIS — G47.00 INSOMNIA, UNSPECIFIED TYPE: ICD-10-CM

## 2019-03-06 DIAGNOSIS — F41.9 ANXIETY: Primary | ICD-10-CM

## 2019-03-06 PROCEDURE — 99214 OFFICE O/P EST MOD 30 MIN: CPT | Performed by: NURSE PRACTITIONER

## 2019-03-06 RX ORDER — TRAZODONE HYDROCHLORIDE 50 MG/1
TABLET ORAL
Qty: 30 TABLET | Refills: 3 | Status: SHIPPED | OUTPATIENT
Start: 2019-03-06 | End: 2019-03-13 | Stop reason: SDUPTHER

## 2019-03-06 RX ORDER — BUPROPION HYDROCHLORIDE 150 MG/1
150 TABLET ORAL DAILY
Qty: 30 TABLET | Refills: 5 | Status: SHIPPED | OUTPATIENT
Start: 2019-03-06 | End: 2019-10-28 | Stop reason: SDUPTHER

## 2019-03-07 ENCOUNTER — TELEPHONE (OUTPATIENT)
Dept: INTERNAL MEDICINE | Facility: CLINIC | Age: 54
End: 2019-03-07

## 2019-03-07 NOTE — TELEPHONE ENCOUNTER
----- Message from Margaret Bailon sent at 3/7/2019  2:34 PM EST -----  Contact: Pt   Pt was in yesterday and her pharm needs clarification on directions for   traZODone (DESYREL) 50 MG tablet [[id number]]   Order Details   Dose, Route, Frequency: As Directed       Sig: May increase to 2 tablets if needed      MARY KAY [address] [facility] - [facility] AT [address] [phone] [place] [id number] FX    Pt# [phone]

## 2019-03-07 NOTE — PROGRESS NOTES
Subjective   Josefa Nazario is a 53 y.o. female who presents due to increased anxiety and insomnia.    She reports difficulty both falling asleep and staying asleep, has previously taking Benadryl nightly but is concerned about a recent study suggesting a link to dementia with Benadryl use.        Anxiety   Presents for initial visit. Onset was 1 to 6 months ago. The problem has been gradually worsening. Symptoms include decreased concentration, excessive worry, insomnia, irritability, malaise, nervous/anxious behavior and restlessness. Patient reports no chest pain, nausea or palpitations. Symptoms occur constantly. The symptoms are aggravated by family issues (caring for mother who has been ill, lives in [place]).     Treatments tried: has taken SSRIs in the past but c/o weight gain with medication.   Insomnia   This is a chronic problem. The current episode started more than 1 year ago. The problem occurs daily. The problem has been rapidly worsening. Associated symptoms include fatigue. Pertinent negatives include no abdominal pain, arthralgias, chest pain, chills, congestion, coughing, fever, headaches, joint swelling, nausea, sore throat, vomiting or weakness. The symptoms are aggravated by stress.        The following portions of the patient's history were reviewed and updated as appropriate: allergies, current medications, past social history and problem list.    Past Medical History:   Diagnosis Date   • Anxiety    • Attention deficit disorder    • Encounter for medication management    • Heart murmur    • Hypothyroidism    • Low back pain          Current Outpatient Medications:   •  amphetamine-dextroamphetamine XR (ADDERALL XR) 20 MG 24 hr capsule, Take 1 capsule by mouth Every Morning Earliest Fill Date: 1/25/19, Disp: 30 capsule, Rfl: 0  •  meloxicam (MOBIC) 15 MG tablet, TAKE 1 TABLET EVERY DAY, Disp: 30 tablet, Rfl: 2  •  SYNTHROID 150 MCG tablet, Take 1 tablet by mouth Daily., Disp: 30 tablet, Rfl:  "1  •  buPROPion XL (WELLBUTRIN XL) 150 MG 24 hr tablet, Take 1 tablet by mouth Daily., Disp: 30 tablet, Rfl: 5  •  traZODone (DESYREL) 50 MG tablet, May increase to 2 tablets if needed, Disp: 30 tablet, Rfl: 3    No Known Allergies    Review of Systems   Constitutional: Positive for fatigue and irritability. Negative for chills, fever and unexpected weight change.   HENT: Negative for congestion, ear pain, postnasal drip, sinus pressure, sore throat and trouble swallowing.    Eyes: Negative for visual disturbance.   Respiratory: Negative for cough, chest tightness and wheezing.    Cardiovascular: Negative for chest pain, palpitations and leg swelling.   Gastrointestinal: Negative for abdominal pain, blood in stool, nausea and vomiting.   Genitourinary: Negative for dysuria, frequency and urgency.   Musculoskeletal: Negative for arthralgias and joint swelling.   Skin: Negative for color change.   Neurological: Negative for syncope, weakness and headaches.   Hematological: Does not bruise/bleed easily.   Psychiatric/Behavioral: Positive for agitation, decreased concentration, dysphoric mood and sleep disturbance. The patient is nervous/anxious and has insomnia.        Objective   Vitals:    03/06/19 1528   BP: 128/80   BP Location: Left arm   Patient Position: Sitting   Cuff Size: Adult   Pulse: 96   SpO2: 95%   Weight: 76.2 kg (168 lb)   Height: 158.8 cm (62.5\")     Physical Exam   Constitutional: She appears well-developed and well-nourished. She is cooperative. She does not have a sickly appearance. She does not appear ill.   HENT:   Head: Normocephalic.   Right Ear: Hearing, tympanic membrane and external ear normal.   Left Ear: Hearing, tympanic membrane and external ear normal.   Nose: Nose normal. No mucosal edema, rhinorrhea, sinus tenderness or nasal deformity. Right sinus exhibits no maxillary sinus tenderness and no frontal sinus tenderness. Left sinus exhibits no maxillary sinus tenderness and no frontal " sinus tenderness.   Mouth/Throat: Oropharynx is clear and moist and mucous membranes are normal. Normal dentition.   Eyes: Conjunctivae and lids are normal. Right eye exhibits no discharge and no exudate. Left eye exhibits no discharge and no exudate.   Neck: Trachea normal. Carotid bruit is not present. No edema present. No thyroid mass present.   Cardiovascular: Regular rhythm, normal heart sounds and normal pulses.   No murmur heard.  Pulmonary/Chest: Breath sounds normal. No respiratory distress. She has no decreased breath sounds. She has no wheezes. She has no rhonchi. She has no rales.   Abdominal: Soft. There is no tenderness.   Lymphadenopathy:        Head (right side): No submental, no submandibular, no tonsillar, no preauricular, no posterior auricular and no occipital adenopathy present.        Head (left side): No submental, no submandibular, no tonsillar, no preauricular, no posterior auricular and no occipital adenopathy present.   Neurological: She is alert.   Skin: Skin is warm, dry and intact. No cyanosis. Nails show no clubbing.       Assessment/Plan   Josefa was seen today for insomnia and anxiety.    Diagnoses and all orders for this visit:    Anxiety  -     buPROPion XL (WELLBUTRIN XL) 150 MG 24 hr tablet; Take 1 tablet by mouth Daily.    Insomnia, unspecified type  -     traZODone (DESYREL) 50 MG tablet; May increase to 2 tablets if needed    Discussed symptoms and the safety of Benadryl at length with patient. She is hesitant to take SSRIs due to concern over weight gain, will try Wellbutrin but monitor for increased agitation. I think the best goal would be to improve sleep quality, will start Trazodone. She is due for fasting labs, will re-evaluate in 6 weeks and check labs at that time (discussed).

## 2019-03-13 DIAGNOSIS — G47.00 INSOMNIA, UNSPECIFIED TYPE: ICD-10-CM

## 2019-03-13 RX ORDER — TRAZODONE HYDROCHLORIDE 50 MG/1
50 TABLET ORAL NIGHTLY
Qty: 30 TABLET | Refills: 3 | Status: SHIPPED | OUTPATIENT
Start: 2019-03-13 | End: 2019-12-09 | Stop reason: SDUPTHER

## 2019-03-26 RX ORDER — DEXTROAMPHETAMINE SACCHARATE, AMPHETAMINE ASPARTATE MONOHYDRATE, DEXTROAMPHETAMINE SULFATE AND AMPHETAMINE SULFATE 5; 5; 5; 5 MG/1; MG/1; MG/1; MG/1
20 CAPSULE, EXTENDED RELEASE ORAL EVERY MORNING
Qty: 30 CAPSULE | Refills: 0 | OUTPATIENT
Start: 2019-03-26

## 2019-03-26 NOTE — TELEPHONE ENCOUNTER
----- Message from Suzanne Espinal sent at 3/25/2019 12:42 PM EDT -----  Contact: Patient  Patient called requesting refill of       amphetamine-dextroamphetamine XR (ADDERALL XR) 20 MG 24 hr capsule    Please call when ready to be picked up    Patient:  [phone]

## 2019-03-26 NOTE — TELEPHONE ENCOUNTER
Last RF 1-25-19  Last Reggie 1-25-19  Last OV with Anu for anxiety  with you was 8-21-18  No future appt in system

## 2019-03-27 RX ORDER — DEXTROAMPHETAMINE SACCHARATE, AMPHETAMINE ASPARTATE MONOHYDRATE, DEXTROAMPHETAMINE SULFATE AND AMPHETAMINE SULFATE 5; 5; 5; 5 MG/1; MG/1; MG/1; MG/1
20 CAPSULE, EXTENDED RELEASE ORAL EVERY MORNING
Qty: 30 CAPSULE | Refills: 0 | Status: SHIPPED | OUTPATIENT
Start: 2019-03-27 | End: 2019-04-16 | Stop reason: SDUPTHER

## 2019-03-27 NOTE — TELEPHONE ENCOUNTER
----- Message from Margaret Bailon sent at 3/27/2019  9:44 AM EDT -----  Contact: Pt   Pt has made an appointment for 4/16 would like to get a refill for medication until then.  amphetamine-dextroamphetamine XR (ADDERALL XR) 20 MG 24 hr capsule    Pt#[phone]

## 2019-04-16 ENCOUNTER — OFFICE VISIT (OUTPATIENT)
Dept: INTERNAL MEDICINE | Facility: CLINIC | Age: 54
End: 2019-04-16

## 2019-04-16 VITALS
DIASTOLIC BLOOD PRESSURE: 74 MMHG | SYSTOLIC BLOOD PRESSURE: 132 MMHG | WEIGHT: 155 LBS | BODY MASS INDEX: 27.46 KG/M2 | HEIGHT: 63 IN

## 2019-04-16 DIAGNOSIS — F90.9 ATTENTION DEFICIT HYPERACTIVITY DISORDER (ADHD), UNSPECIFIED ADHD TYPE: Primary | ICD-10-CM

## 2019-04-16 DIAGNOSIS — E03.9 HYPOTHYROIDISM (ACQUIRED): ICD-10-CM

## 2019-04-16 PROCEDURE — 99213 OFFICE O/P EST LOW 20 MIN: CPT | Performed by: INTERNAL MEDICINE

## 2019-04-16 RX ORDER — DEXTROAMPHETAMINE SACCHARATE, AMPHETAMINE ASPARTATE MONOHYDRATE, DEXTROAMPHETAMINE SULFATE AND AMPHETAMINE SULFATE 5; 5; 5; 5 MG/1; MG/1; MG/1; MG/1
20 CAPSULE, EXTENDED RELEASE ORAL EVERY MORNING
Qty: 30 CAPSULE | Refills: 0 | Status: SHIPPED | OUTPATIENT
Start: 2019-04-16 | End: 2019-05-30 | Stop reason: SDUPTHER

## 2019-04-16 NOTE — PROGRESS NOTES
Chief Complaint   Patient presents with   • ADD     follow up       Subjective   Josefa Nazario is a 53 y.o. female.     History of Present Illness     ADD:  She comes in for follow up of ADD.  This is a chronic problem.  Symptoms include easy distractibility, poor concentration, short attention span, forgetfulness.  This happens at work.  Sometimes also at home.  Symptoms are moderate in severity.   She has not noticed exacerbating factors.  Relieving factors include stimulant medication.  Associated symptoms negative for anorexia, weight loss, sleep disturbance, anxiety, irritability  Current treatment stimulant medication.By report, there is good compliance with treatment, good tolerance of treatment and good symptom control.      Hypothyroidism:  No changes in heat/cold tolerance, energy is good.  Current treatment: Levothyroxine.  By report, there is good compliance with treatment, good tolerance of treatment and good symptom control.     The following portions of the patient's history were reviewed and updated as appropriate: allergies, current medications, past family history, past medical history, past social history, past surgical history and problem list.    Review of Systems   Constitutional: Negative for appetite change, unexpected weight gain and unexpected weight loss.   HENT: Negative for nosebleeds.    Eyes: Negative for blurred vision and double vision.   Respiratory: Negative for cough and shortness of breath.    Cardiovascular: Negative for chest pain, palpitations and leg swelling.         Current Outpatient Medications:   •  amphetamine-dextroamphetamine XR (ADDERALL XR) 20 MG 24 hr capsule, Take 1 capsule by mouth Every Morning, Disp: 30 capsule, Rfl: 0  •  buPROPion XL (WELLBUTRIN XL) 150 MG 24 hr tablet, Take 1 tablet by mouth Daily., Disp: 30 tablet, Rfl: 5  •  meloxicam (MOBIC) 15 MG tablet, TAKE 1 TABLET EVERY DAY, Disp: 30 tablet, Rfl: 2  •  SYNTHROID 150 MCG tablet, Take 1 tablet by  "mouth Daily., Disp: 30 tablet, Rfl: 1  •  traZODone (DESYREL) 50 MG tablet, Take 1 tablet by mouth Every Night. May increase to 2 tablets if needed, Disp: 30 tablet, Rfl: 3        Objective     /74   Ht 158.8 cm (62.5\")   Wt 70.3 kg (155 lb)   BMI 27.90 kg/m²     Physical Exam   Constitutional: She is oriented to person, place, and time. She appears well-developed and well-nourished. No distress.   Neck: Normal carotid pulses present. Carotid bruit is not present.   Cardiovascular: Regular rhythm, S1 normal and S2 normal. Exam reveals no gallop and no friction rub.   No murmur heard.  Pulses:       Carotid pulses are 2+ on the right side, and 2+ on the left side.  Pulmonary/Chest: Effort normal and breath sounds normal. She has no wheezes. She has no rhonchi. She has no rales. Chest wall is not dull to percussion.   Musculoskeletal: She exhibits no edema.   Neurological: She is alert and oriented to person, place, and time.   Skin: Skin is warm and dry.   Nursing note and vitals reviewed.        Assessment/Plan   Josefa was seen today for add.    Diagnoses and all orders for this visit:    Attention deficit hyperactivity disorder (ADHD), unspecified ADHD type    Hypothyroidism (acquired)  -     Comprehensive Metabolic Panel; Future  -     Urinalysis With Microscopic If Indicated (No Culture) - Urine, Clean Catch; Future  -     Lipid Panel; Future  -     TSH Rfx On Abnormal To Free T4; Future  -     CBC (No Diff); Future    Other orders  -     amphetamine-dextroamphetamine XR (ADDERALL XR) 20 MG 24 hr capsule; Take 1 capsule by mouth Every Morning               "

## 2019-05-30 RX ORDER — DEXTROAMPHETAMINE SACCHARATE, AMPHETAMINE ASPARTATE MONOHYDRATE, DEXTROAMPHETAMINE SULFATE AND AMPHETAMINE SULFATE 5; 5; 5; 5 MG/1; MG/1; MG/1; MG/1
20 CAPSULE, EXTENDED RELEASE ORAL EVERY MORNING
Qty: 30 CAPSULE | Refills: 0 | Status: SHIPPED | OUTPATIENT
Start: 2019-05-30 | End: 2019-07-12 | Stop reason: SDUPTHER

## 2019-05-30 NOTE — TELEPHONE ENCOUNTER
----- Message from Margaret Bailon sent at 5/30/2019 11:05 AM EDT -----  Contact: pt  Pt is calling for a refill     FOR  amphetamine-dextroamphetamine XR (ADDERALL XR) 20 MG 24 hr capsule      Patient requests RX SENT TO   [facility] HE [address] [place] [address] [address] [facility] AT [address] [phone] [facility] [phone] FX      Caller#[phone]      Please and thank you.

## 2019-06-06 RX ORDER — LEVOTHYROXINE SODIUM 150 MCG
TABLET ORAL
Qty: 30 TABLET | Refills: 3 | Status: SHIPPED | OUTPATIENT
Start: 2019-06-06 | End: 2019-11-04 | Stop reason: SDUPTHER

## 2019-07-12 RX ORDER — DEXTROAMPHETAMINE SACCHARATE, AMPHETAMINE ASPARTATE MONOHYDRATE, DEXTROAMPHETAMINE SULFATE AND AMPHETAMINE SULFATE 5; 5; 5; 5 MG/1; MG/1; MG/1; MG/1
20 CAPSULE, EXTENDED RELEASE ORAL EVERY MORNING
Qty: 30 CAPSULE | Refills: 0 | Status: SHIPPED | OUTPATIENT
Start: 2019-07-12 | End: 2019-09-05 | Stop reason: SDUPTHER

## 2019-07-12 NOTE — TELEPHONE ENCOUNTER
----- Message from Margaret Bailon sent at 7/12/2019  1:47 PM EDT -----  Contact: Pt   Pt is calling for a refill     FOR  amphetamine-dextroamphetamine XR (ADDERALL XR) 20 MG 24 hr capsule    Caller# [phone]     Please and thank you.

## 2019-09-06 RX ORDER — DEXTROAMPHETAMINE SACCHARATE, AMPHETAMINE ASPARTATE MONOHYDRATE, DEXTROAMPHETAMINE SULFATE AND AMPHETAMINE SULFATE 5; 5; 5; 5 MG/1; MG/1; MG/1; MG/1
20 CAPSULE, EXTENDED RELEASE ORAL EVERY MORNING
Qty: 30 CAPSULE | Refills: 0 | Status: SHIPPED | OUTPATIENT
Start: 2019-09-06 | End: 2019-10-16 | Stop reason: SDUPTHER

## 2019-10-16 RX ORDER — DEXTROAMPHETAMINE SACCHARATE, AMPHETAMINE ASPARTATE MONOHYDRATE, DEXTROAMPHETAMINE SULFATE AND AMPHETAMINE SULFATE 5; 5; 5; 5 MG/1; MG/1; MG/1; MG/1
20 CAPSULE, EXTENDED RELEASE ORAL EVERY MORNING
Qty: 30 CAPSULE | Refills: 0 | Status: SHIPPED | OUTPATIENT
Start: 2019-10-16 | End: 2019-11-13 | Stop reason: SDUPTHER

## 2019-10-16 NOTE — TELEPHONE ENCOUNTER
----- Message from Margaret Bailon sent at 10/16/2019 10:08 AM EDT -----  Contact: Pt   Pt is calling for a refill     FOR  amphetamine-dextroamphetamine XR (ADDERALL XR) 20 MG 24 hr capsule      Caller# [phone]     Please and thank you.

## 2019-10-28 DIAGNOSIS — F41.9 ANXIETY: ICD-10-CM

## 2019-10-29 RX ORDER — BUPROPION HYDROCHLORIDE 150 MG/1
TABLET ORAL
Qty: 30 TABLET | Refills: 4 | Status: SHIPPED | OUTPATIENT
Start: 2019-10-29 | End: 2020-05-14

## 2019-11-04 ENCOUNTER — OFFICE VISIT (OUTPATIENT)
Dept: INTERNAL MEDICINE | Facility: CLINIC | Age: 54
End: 2019-11-04

## 2019-11-04 VITALS
HEART RATE: 80 BPM | DIASTOLIC BLOOD PRESSURE: 86 MMHG | WEIGHT: 174 LBS | OXYGEN SATURATION: 95 % | TEMPERATURE: 98.6 F | SYSTOLIC BLOOD PRESSURE: 138 MMHG | BODY MASS INDEX: 31.32 KG/M2

## 2019-11-04 DIAGNOSIS — J30.1 SEASONAL ALLERGIC RHINITIS DUE TO POLLEN: ICD-10-CM

## 2019-11-04 DIAGNOSIS — H92.02 LEFT EAR PAIN: Primary | ICD-10-CM

## 2019-11-04 PROCEDURE — 99213 OFFICE O/P EST LOW 20 MIN: CPT | Performed by: NURSE PRACTITIONER

## 2019-11-04 RX ORDER — FLUTICASONE PROPIONATE 50 MCG
2 SPRAY, SUSPENSION (ML) NASAL DAILY
Qty: 1 BOTTLE | Refills: 3 | Status: SHIPPED | OUTPATIENT
Start: 2019-11-04 | End: 2019-12-04

## 2019-11-04 NOTE — PROGRESS NOTES
Subjective     Josefa Nazario is a 54 y.o. female.         Patient presents with:  Earache: left     She reports intermittent sharp pains to L ear. PMH of allergic rhinitis. She walsh snot take anything for allergies currently. She also reports some sinus pain, pressure, and swelling.      Earache    There is pain in the left ear. This is a new problem. The current episode started 1 to 4 weeks ago. The problem occurs every few hours. The problem has been waxing and waning. There has been no fever. The pain is at a severity of 8/10 (at it's worst). Pertinent negatives include no coughing, ear discharge, headaches, hearing loss, sore throat or vomiting. She has tried ear drops for the symptoms. The treatment provided no relief. There is no history of hearing loss.        The following portions of the patient's history were reviewed and updated as appropriate: allergies, current medications, past social history and problem list.    Review of Systems   Constitutional: Negative for fatigue and fever.   HENT: Positive for ear pain, facial swelling, sinus pressure and sinus pain. Negative for congestion, ear discharge, hearing loss, postnasal drip, sore throat and tinnitus.    Respiratory: Negative for cough.    Gastrointestinal: Negative for vomiting.   Skin: Negative for color change and wound.   Allergic/Immunologic: Positive for environmental allergies.   Neurological: Negative for headaches.       Objective     /86 (BP Location: Right arm, Patient Position: Sitting, Cuff Size: Adult)   Pulse 80   Temp 98.6 °F (37 °C)   Wt 78.9 kg (174 lb)   SpO2 95%   BMI 31.32 kg/m²     Physical Exam   Constitutional: She appears well-developed and well-nourished.   HENT:   Head: Normocephalic and atraumatic.   Right Ear: Hearing and tympanic membrane normal. No drainage, swelling or tenderness.   Left Ear: Hearing and tympanic membrane normal. No drainage, swelling or tenderness.   A patch of erythema to L canal.    Pulmonary/Chest: Effort normal. No respiratory distress.   Musculoskeletal: Normal range of motion.   Neurological: She is alert.   Skin: Skin is warm and dry.   Psychiatric: She has a normal mood and affect. Her behavior is normal. Judgment and thought content normal.   Vitals reviewed.      Assessment/Plan     Josefa was seen today for earache.    Diagnoses and all orders for this visit:    Left ear pain  -     neomycin-polymyxin-hydrocortisone (CORTISPORIN) 3.5-72270-8 otic solution; Administer 3 drops into the left ear 4 (Four) Times a Day.    Seasonal allergic rhinitis due to pollen  -     fluticasone (FLONASE) 50 MCG/ACT nasal spray; 2 sprays into the nostril(s) as directed by provider Daily for 30 days.    She will also add daily Allegra.    Return for worsening of sx.

## 2019-11-13 ENCOUNTER — OFFICE VISIT (OUTPATIENT)
Dept: INTERNAL MEDICINE | Facility: CLINIC | Age: 54
End: 2019-11-13

## 2019-11-13 VITALS
SYSTOLIC BLOOD PRESSURE: 118 MMHG | BODY MASS INDEX: 30.48 KG/M2 | TEMPERATURE: 98.5 F | HEIGHT: 63 IN | HEART RATE: 107 BPM | OXYGEN SATURATION: 99 % | DIASTOLIC BLOOD PRESSURE: 64 MMHG | WEIGHT: 172 LBS

## 2019-11-13 DIAGNOSIS — E03.9 HYPOTHYROIDISM (ACQUIRED): ICD-10-CM

## 2019-11-13 DIAGNOSIS — J06.9 ACUTE URI: Primary | ICD-10-CM

## 2019-11-13 DIAGNOSIS — F90.9 ATTENTION DEFICIT HYPERACTIVITY DISORDER (ADHD), UNSPECIFIED ADHD TYPE: ICD-10-CM

## 2019-11-13 DIAGNOSIS — Z11.59 SCREENING FOR VIRAL DISEASE: ICD-10-CM

## 2019-11-13 LAB
ALBUMIN SERPL-MCNC: 4.4 G/DL (ref 3.5–5.2)
ALBUMIN/GLOB SERPL: 1.3 G/DL
ALP SERPL-CCNC: 90 U/L (ref 39–117)
ALT SERPL W P-5'-P-CCNC: 19 U/L (ref 1–33)
ANION GAP SERPL CALCULATED.3IONS-SCNC: 11.4 MMOL/L (ref 5–15)
AST SERPL-CCNC: 22 U/L (ref 1–32)
BACTERIA UR QL AUTO: ABNORMAL /HPF
BILIRUB SERPL-MCNC: 0.6 MG/DL (ref 0.2–1.2)
BILIRUB UR QL STRIP: NEGATIVE
BUN BLD-MCNC: 12 MG/DL (ref 6–20)
BUN/CREAT SERPL: 14.1 (ref 7–25)
CALCIUM SPEC-SCNC: 9.2 MG/DL (ref 8.6–10.5)
CHLORIDE SERPL-SCNC: 97 MMOL/L (ref 98–107)
CHOLEST SERPL-MCNC: 217 MG/DL (ref 0–200)
CLARITY UR: CLEAR
CO2 SERPL-SCNC: 22.6 MMOL/L (ref 22–29)
COLOR UR: YELLOW
CREAT BLD-MCNC: 0.85 MG/DL (ref 0.57–1)
DEPRECATED RDW RBC AUTO: 41.5 FL (ref 37–54)
ERYTHROCYTE [DISTWIDTH] IN BLOOD BY AUTOMATED COUNT: 12.7 % (ref 12.3–15.4)
GFR SERPL CREATININE-BSD FRML MDRD: 69 ML/MIN/1.73
GLOBULIN UR ELPH-MCNC: 3.5 GM/DL
GLUCOSE BLD-MCNC: 101 MG/DL (ref 65–99)
GLUCOSE UR STRIP-MCNC: NEGATIVE MG/DL
HCT VFR BLD AUTO: 39.4 % (ref 34–46.6)
HDLC SERPL-MCNC: 68 MG/DL (ref 40–60)
HGB BLD-MCNC: 13.4 G/DL (ref 12–15.9)
HGB UR QL STRIP.AUTO: ABNORMAL
HYALINE CASTS UR QL AUTO: ABNORMAL /LPF
KETONES UR QL STRIP: NEGATIVE
LDLC SERPL CALC-MCNC: 127 MG/DL (ref 0–100)
LDLC/HDLC SERPL: 1.86 {RATIO}
LEUKOCYTE ESTERASE UR QL STRIP.AUTO: NEGATIVE
MCH RBC QN AUTO: 30.9 PG (ref 26.6–33)
MCHC RBC AUTO-ENTMCNC: 34 G/DL (ref 31.5–35.7)
MCV RBC AUTO: 91 FL (ref 79–97)
NITRITE UR QL STRIP: NEGATIVE
PH UR STRIP.AUTO: 6.5 [PH] (ref 5–8)
PLATELET # BLD AUTO: 242 10*3/MM3 (ref 140–450)
PMV BLD AUTO: 12 FL (ref 6–12)
POTASSIUM BLD-SCNC: 4 MMOL/L (ref 3.5–5.2)
PROT SERPL-MCNC: 7.9 G/DL (ref 6–8.5)
PROT UR QL STRIP: NEGATIVE
RBC # BLD AUTO: 4.33 10*6/MM3 (ref 3.77–5.28)
RBC # UR: ABNORMAL /HPF
REF LAB TEST METHOD: ABNORMAL
SODIUM BLD-SCNC: 131 MMOL/L (ref 136–145)
SP GR UR STRIP: 1.01 (ref 1–1.03)
SQUAMOUS #/AREA URNS HPF: ABNORMAL /HPF
TRIGL SERPL-MCNC: 111 MG/DL (ref 0–150)
UROBILINOGEN UR QL STRIP: ABNORMAL
VLDLC SERPL-MCNC: 22.2 MG/DL (ref 5–40)
WBC NRBC COR # BLD: 10.14 10*3/MM3 (ref 3.4–10.8)
WBC UR QL AUTO: ABNORMAL /HPF
YEAST URNS QL MICRO: ABNORMAL /HPF

## 2019-11-13 PROCEDURE — 80061 LIPID PANEL: CPT | Performed by: INTERNAL MEDICINE

## 2019-11-13 PROCEDURE — 80053 COMPREHEN METABOLIC PANEL: CPT | Performed by: INTERNAL MEDICINE

## 2019-11-13 PROCEDURE — 99213 OFFICE O/P EST LOW 20 MIN: CPT | Performed by: INTERNAL MEDICINE

## 2019-11-13 PROCEDURE — 85027 COMPLETE CBC AUTOMATED: CPT | Performed by: INTERNAL MEDICINE

## 2019-11-13 PROCEDURE — 81001 URINALYSIS AUTO W/SCOPE: CPT | Performed by: INTERNAL MEDICINE

## 2019-11-13 RX ORDER — AZITHROMYCIN 250 MG/1
TABLET, FILM COATED ORAL
Qty: 6 TABLET | Refills: 0 | Status: SHIPPED | OUTPATIENT
Start: 2019-11-13 | End: 2020-05-14

## 2019-11-13 RX ORDER — DEXTROAMPHETAMINE SACCHARATE, AMPHETAMINE ASPARTATE MONOHYDRATE, DEXTROAMPHETAMINE SULFATE AND AMPHETAMINE SULFATE 5; 5; 5; 5 MG/1; MG/1; MG/1; MG/1
20 CAPSULE, EXTENDED RELEASE ORAL EVERY MORNING
Qty: 30 CAPSULE | Refills: 0 | Status: SHIPPED | OUTPATIENT
Start: 2019-11-13 | End: 2020-02-03 | Stop reason: SDUPTHER

## 2019-11-13 NOTE — PROGRESS NOTES
Chief Complaint   Patient presents with   • Hypothyroidism     4 month follow up   • Generalized Body Aches     body aches, pressure in chest and mucus in chest   • ADD       Subjective   Josefa Nazario is a 54 y.o. female.     Hypothyroidism   Associated symptoms include chills, congestion, coughing and swollen glands (left side). Pertinent negatives include no chest pain, diaphoresis, fever, nausea or vomiting.      ADD:  She comes in for follow up of ADD.  This is a chronic problem.  Symptoms include easy distractibility, poor concentration, short attention span, forgetfulness.    Exacerbating factors include fatigue, distracting activities.  Relieving factors include stimulant medication.  Associated symptoms negative for anorexia, weight loss, sleep disturbance, anxiety, irritability  Current treatment stimulant medication.By report, there is good compliance with treatment, good tolerance of treatment and good symptom control.      Last week - had left ear ache and sinus pessure.  She has been using ear drops, flonase and allegra. That has helped the ear. Now, she feels she has chest congestion.  She feels she needs to cough up sputum but can't because it is thick.  No fevers.  Feels generally achy. She has sinus pressure. A little sore throat. No wheezing. She really doesn't have a cough but feels very congested.     The following portions of the patient's history were reviewed and updated as appropriate: allergies, current medications, past family history, past medical history, past social history, past surgical history and problem list.    Review of Systems   Constitutional: Positive for chills. Negative for appetite change, diaphoresis, fever, unexpected weight gain and unexpected weight loss.   HENT: Positive for congestion, sinus pressure and swollen glands (left side). Negative for postnasal drip, rhinorrhea and trouble swallowing.    Respiratory: Positive for cough. Negative for shortness of breath  "and wheezing.         Chest congestion   Cardiovascular: Negative for chest pain, palpitations and leg swelling.   Gastrointestinal: Negative for diarrhea, nausea and vomiting.   Endocrine: Negative for cold intolerance and heat intolerance.   Psychiatric/Behavioral: Negative for decreased concentration (good with adderall).         Current Outpatient Medications:   •  amphetamine-dextroamphetamine XR (ADDERALL XR) 20 MG 24 hr capsule, Take 1 capsule by mouth Every Morning, Disp: 30 capsule, Rfl: 0  •  azithromycin (ZITHROMAX Z-SEVERO) 250 MG tablet, Take 2 tablets the first day, then 1 tablet daily for 4 days., Disp: 6 tablet, Rfl: 0  •  buPROPion XL (WELLBUTRIN XL) 150 MG 24 hr tablet, TAKE ONE TABLET BY MOUTH DAILY, Disp: 30 tablet, Rfl: 4  •  fluticasone (FLONASE) 50 MCG/ACT nasal spray, 2 sprays into the nostril(s) as directed by provider Daily for 30 days., Disp: 1 bottle, Rfl: 3  •  meloxicam (MOBIC) 15 MG tablet, TAKE 1 TABLET EVERY DAY, Disp: 30 tablet, Rfl: 2  •  neomycin-polymyxin-hydrocortisone (CORTISPORIN) 3.5-99361-9 otic solution, Administer 3 drops into the left ear 4 (Four) Times a Day., Disp: 10 mL, Rfl: 0  •  SYNTHROID 150 MCG tablet, Take 1 tablet by mouth Daily., Disp: 30 tablet, Rfl: 1  •  traZODone (DESYREL) 50 MG tablet, Take 1 tablet by mouth Every Night. May increase to 2 tablets if needed, Disp: 30 tablet, Rfl: 3        Objective     /64   Pulse 107   Temp 98.5 °F (36.9 °C)   Ht 158.8 cm (62.5\")   Wt 78 kg (172 lb)   SpO2 99%   BMI 30.96 kg/m²     Physical Exam   Constitutional: She is oriented to person, place, and time. She appears well-developed and well-nourished. No distress.   HENT:   Right Ear: Tympanic membrane normal.   Left Ear: Tympanic membrane normal.   Mouth/Throat: Oropharynx is clear and moist.   Neck: Normal carotid pulses present. Carotid bruit is not present.   Cardiovascular: Regular rhythm, S1 normal and S2 normal. Exam reveals no gallop and no friction rub. "   No murmur heard.  Pulses:       Carotid pulses are 2+ on the right side, and 2+ on the left side.  Pulmonary/Chest: Effort normal and breath sounds normal. She has no wheezes. She has no rhonchi. She has no rales. Chest wall is not dull to percussion.   Musculoskeletal: She exhibits no edema.   Neurological: She is alert and oriented to person, place, and time.   Skin: Skin is warm and dry.   Nursing note and vitals reviewed.        Assessment/Plan   Josefa was seen today for hypothyroidism, generalized body aches and add.    Diagnoses and all orders for this visit:    Acute URI    Hypothyroidism (acquired)  -     Comprehensive Metabolic Panel  -     Urinalysis With Microscopic If Indicated (No Culture) - Urine, Clean Catch  -     Lipid Panel  -     TSH Rfx On Abnormal To Free T4  -     CBC (No Diff)  -     Urinalysis, Microscopic Only - Urine, Clean Catch; Future  -     Urinalysis, Microscopic Only - Urine, Clean Catch    Attention deficit hyperactivity disorder (ADHD), unspecified ADHD type    Screening for viral disease  -     Hepatitis C Antibody; Future  -     Hepatitis C Antibody    Other orders  -     azithromycin (ZITHROMAX Z-SEVERO) 250 MG tablet; Take 2 tablets the first day, then 1 tablet daily for 4 days.  -     amphetamine-dextroamphetamine XR (ADDERALL XR) 20 MG 24 hr capsule; Take 1 capsule by mouth Every Morning

## 2019-11-14 LAB
HCV AB S/CO SERPL IA: 0.1 S/CO RATIO (ref 0–0.9)
T4 FREE SERPL-MCNC: 1.58 NG/DL (ref 0.93–1.7)
TSH SERPL-ACNC: 0.29 UIU/ML (ref 0.27–4.2)

## 2019-12-09 ENCOUNTER — TELEPHONE (OUTPATIENT)
Dept: INTERNAL MEDICINE | Facility: CLINIC | Age: 54
End: 2019-12-09

## 2019-12-09 DIAGNOSIS — G47.00 INSOMNIA, UNSPECIFIED TYPE: ICD-10-CM

## 2019-12-09 RX ORDER — LEVOTHYROXINE SODIUM 150 MCG
150 TABLET ORAL DAILY
Qty: 30 TABLET | Refills: 3 | Status: SHIPPED | OUTPATIENT
Start: 2019-12-09 | End: 2020-06-15

## 2019-12-09 RX ORDER — LEVOTHYROXINE SODIUM 150 MCG
150 TABLET ORAL DAILY
Qty: 30 TABLET | Refills: 1 | Status: SHIPPED | OUTPATIENT
Start: 2019-12-09 | End: 2019-12-09 | Stop reason: SDUPTHER

## 2019-12-09 RX ORDER — TRAZODONE HYDROCHLORIDE 50 MG/1
50 TABLET ORAL NIGHTLY
Qty: 30 TABLET | Refills: 3 | Status: SHIPPED | OUTPATIENT
Start: 2019-12-09 | End: 2019-12-09 | Stop reason: SDUPTHER

## 2019-12-09 RX ORDER — TRAZODONE HYDROCHLORIDE 50 MG/1
50 TABLET ORAL NIGHTLY
Qty: 30 TABLET | Refills: 3 | Status: SHIPPED | OUTPATIENT
Start: 2019-12-09 | End: 2020-08-31

## 2019-12-09 NOTE — TELEPHONE ENCOUNTER
Pt needs refills on Synthroid 150 mcg tab and trazodone 50 mg tab sent to [facility]

## 2019-12-10 NOTE — PROGRESS NOTES
Spencer Tapia  [address]  [facility] [address]      12/11/2019    Dear Spencer     We have made several attempts to contact you by telephone, but have been unable to do so.       Please call the clinic at your earliest convenience to: schedule an appointment to discuss your recent fall and request for Gabapentin prescription.  We have not prescribed that medication to you and you will need to be evaluated to see what treatment option is best for you.  You can contact our office at the number listed below to make an appointment.      Sincerely,     Ketan Nye    [address] [facility] [address]  [facility] [address] [phone]   Chief Complaint   Patient presents with   • Hypothyroidism     4 month follow up   • ADD       Subjective   Josefa Nazario is a 53 y.o. female.     History of Present Illness     ADD:  She comes in for follow up of ADD.  This is a chronic problem.  Symptoms include easy distractibility, poor concentration, short attention span, forgetfulness.   Symptoms are moderate in severity.  Symptoms have improved with treatment.  Relieving factors include stimulant medication.  Associated symptoms negative for anorexia, weight loss, sleep disturbance, anxiety, irritability  Current treatment stimulant medication.By report, there is good compliance with treatment, good tolerance of treatment and good symptom control.  She has history of sleep problems.  She states that this is chronic and started before initiation of treatment with Adderall.       Hypothyroidism: Current treatment levothyroxine. She is compliant with the medication, tolerates it well and reports good control of symptoms of hypothyroidism.  She has not noted any change in tolerance of heat or cold.  No change in hair or skin.  No constipation.  No symptoms of hyperthyroidism.      She is having a significant amount of joint problems.  She has back pain.  She has recently developed exquisite heel pain.  It hurts to walk.  Her left heel is affected.  Her right heel is okay.  She has been taking meloxicam, she uses ice, she rolls her foot on a frozen bottle of water.  She does stretches.  Nothing has helped.    The following portions of the patient's history were reviewed and updated as appropriate: allergies, current medications, past family history, past medical history, past social history, past surgical history and problem list.    Review of Systems   Constitutional: Negative for appetite change.   HENT: Negative for nosebleeds.    Eyes: Negative for blurred vision and double vision.   Respiratory: Negative for cough and shortness of breath.   "  Cardiovascular: Negative for chest pain, palpitations and leg swelling.   Psychiatric/Behavioral: Positive for sleep disturbance (gets 5-6 hours sleep).         Current Outpatient Prescriptions:   •  amphetamine-dextroamphetamine XR (ADDERALL XR) 20 MG 24 hr capsule, Take 1 capsule by mouth Every Morning, Disp: 30 capsule, Rfl: 0  •  meloxicam (MOBIC) 15 MG tablet, Take 1 tablet by mouth Daily., Disp: 30 tablet, Rfl: 6  •  SYNTHROID 150 MCG tablet, Take 1 tablet by mouth Daily., Disp: 30 tablet, Rfl: 1        Objective     /68   Ht 158.8 cm (62.5\")   Wt 76.7 kg (169 lb)   BMI 30.42 kg/m²     Physical Exam   Constitutional: She is oriented to person, place, and time. She appears well-developed and well-nourished. No distress.   Neck: Normal carotid pulses present. Carotid bruit is not present.   Cardiovascular: Regular rhythm, S1 normal and S2 normal.  Exam reveals no gallop and no friction rub.    No murmur heard.  Pulses:       Carotid pulses are 2+ on the right side, and 2+ on the left side.  Pulmonary/Chest: Effort normal and breath sounds normal. She has no wheezes. She has no rhonchi. She has no rales. Chest wall is not dull to percussion.   Musculoskeletal: She exhibits no edema.   Left heel is tender to palpation   Neurological: She is alert and oriented to person, place, and time.   Skin: Skin is warm and dry.   Nursing note and vitals reviewed.        Assessment/Plan   Josefa was seen today for hypothyroidism and add.    Diagnoses and all orders for this visit:    Attention deficit hyperactivity disorder (ADHD), unspecified ADHD type    Hypothyroidism (acquired)  -     TSH Rfx On Abnormal To Free T4; Future    Other orders  -     amphetamine-dextroamphetamine XR (ADDERALL XR) 20 MG 24 hr capsule; Take 1 capsule by mouth Every Morning      She will continue Adderall for ADD.  Levothyroxine dose was recently increased.  TSH needs to be checked.         "

## 2020-02-03 DIAGNOSIS — F90.9 ATTENTION DEFICIT HYPERACTIVITY DISORDER (ADHD), UNSPECIFIED ADHD TYPE: Primary | ICD-10-CM

## 2020-02-03 NOTE — TELEPHONE ENCOUNTER
Patient called in to request a refill on the selected med.  amphetamine-dextroamphetamine XR (ADDERALL XR) 20 MG 24 hr capsule.    Pt. Call back [phone]

## 2020-02-04 RX ORDER — DEXTROAMPHETAMINE SACCHARATE, AMPHETAMINE ASPARTATE MONOHYDRATE, DEXTROAMPHETAMINE SULFATE AND AMPHETAMINE SULFATE 5; 5; 5; 5 MG/1; MG/1; MG/1; MG/1
20 CAPSULE, EXTENDED RELEASE ORAL EVERY MORNING
Qty: 30 CAPSULE | Refills: 0 | Status: SHIPPED | OUTPATIENT
Start: 2020-02-04 | End: 2020-03-30 | Stop reason: SDUPTHER

## 2020-02-12 ENCOUNTER — APPOINTMENT (OUTPATIENT)
Dept: WOMENS IMAGING | Facility: HOSPITAL | Age: 55
End: 2020-02-12

## 2020-02-12 PROCEDURE — 77067 SCR MAMMO BI INCL CAD: CPT | Performed by: RADIOLOGY

## 2020-03-30 ENCOUNTER — TELEPHONE (OUTPATIENT)
Dept: INTERNAL MEDICINE | Facility: CLINIC | Age: 55
End: 2020-03-30

## 2020-03-30 DIAGNOSIS — F90.9 ATTENTION DEFICIT HYPERACTIVITY DISORDER (ADHD), UNSPECIFIED ADHD TYPE: ICD-10-CM

## 2020-03-30 RX ORDER — DEXTROAMPHETAMINE SACCHARATE, AMPHETAMINE ASPARTATE MONOHYDRATE, DEXTROAMPHETAMINE SULFATE AND AMPHETAMINE SULFATE 5; 5; 5; 5 MG/1; MG/1; MG/1; MG/1
20 CAPSULE, EXTENDED RELEASE ORAL EVERY MORNING
Qty: 30 CAPSULE | Refills: 0 | Status: SHIPPED | OUTPATIENT
Start: 2020-03-30 | End: 2020-05-14 | Stop reason: SDUPTHER

## 2020-03-30 NOTE — TELEPHONE ENCOUNTER
I printed prescription    PATIENT CALLED TO REQUEST A REFILL ON amphetamine-dextroamphetamine XR (ADDERALL XR) 20 MG 24 hr capsule    PLEASE SEND RX TO [facility]  - [place] - [address] [facility] AT [address] - [phone] [facility] [phone] FX   P: [phone]

## 2020-04-01 ENCOUNTER — TELEPHONE (OUTPATIENT)
Dept: INTERNAL MEDICINE | Facility: CLINIC | Age: 55
End: 2020-04-01

## 2020-04-01 RX ORDER — METHYLPREDNISOLONE 4 MG/1
TABLET ORAL
Qty: 21 TABLET | Refills: 0 | Status: SHIPPED | OUTPATIENT
Start: 2020-04-01 | End: 2020-05-14

## 2020-04-01 NOTE — TELEPHONE ENCOUNTER
PT IS COVERED IN POISON IVY AND WOULD LIKE AN RX SENT IN FOR PREDNISONE INSTEAD OF HER HAVING TO COME IN THE OFFICE FOR A VISIT.    PT CONFIRMED [facility] [employer]     PT CALL BACK [phone]

## 2020-04-01 NOTE — TELEPHONE ENCOUNTER
I sent in a prescription for a Medrol Dosepak.  She may also take over-the-counter Benadryl, use calamine lotion.

## 2020-05-12 ENCOUNTER — TELEPHONE (OUTPATIENT)
Dept: INTERNAL MEDICINE | Facility: CLINIC | Age: 55
End: 2020-05-12

## 2020-05-12 DIAGNOSIS — F90.9 ATTENTION DEFICIT HYPERACTIVITY DISORDER (ADHD), UNSPECIFIED ADHD TYPE: ICD-10-CM

## 2020-05-12 RX ORDER — DEXTROAMPHETAMINE SACCHARATE, AMPHETAMINE ASPARTATE MONOHYDRATE, DEXTROAMPHETAMINE SULFATE AND AMPHETAMINE SULFATE 5; 5; 5; 5 MG/1; MG/1; MG/1; MG/1
20 CAPSULE, EXTENDED RELEASE ORAL EVERY MORNING
Qty: 30 CAPSULE | Refills: 0 | Status: CANCELLED | OUTPATIENT
Start: 2020-05-12

## 2020-05-13 NOTE — TELEPHONE ENCOUNTER
Last OV 11/13/2019  Next OV 9/16/2020    Left message for pt to schedule an OV either by telehealth or in person so Rx can be refilled.

## 2020-05-14 ENCOUNTER — OFFICE VISIT (OUTPATIENT)
Dept: INTERNAL MEDICINE | Facility: CLINIC | Age: 55
End: 2020-05-14

## 2020-05-14 DIAGNOSIS — F90.9 ATTENTION DEFICIT HYPERACTIVITY DISORDER (ADHD), UNSPECIFIED ADHD TYPE: ICD-10-CM

## 2020-05-14 DIAGNOSIS — E03.9 HYPOTHYROIDISM (ACQUIRED): Primary | ICD-10-CM

## 2020-05-14 PROCEDURE — 99442 PR PHYS/QHP TELEPHONE EVALUATION 11-20 MIN: CPT | Performed by: FAMILY MEDICINE

## 2020-05-14 RX ORDER — DEXTROAMPHETAMINE SACCHARATE, AMPHETAMINE ASPARTATE MONOHYDRATE, DEXTROAMPHETAMINE SULFATE AND AMPHETAMINE SULFATE 5; 5; 5; 5 MG/1; MG/1; MG/1; MG/1
20 CAPSULE, EXTENDED RELEASE ORAL EVERY MORNING
Qty: 30 CAPSULE | Refills: 0 | Status: SHIPPED | OUTPATIENT
Start: 2020-05-14 | End: 2020-06-24 | Stop reason: SDUPTHER

## 2020-05-14 NOTE — PROGRESS NOTES
Subjective   Josefa Nazario is a 54 y.o. female.     Chief Complaint   Patient presents with   • ADD         History of Present Illness   This visit has been rescheduled as a phone visit to comply with patient safety concerns in accordance with CDC recommendations. Total time of discussion was 13 minutes.  You have chosen to receive care through a telephone visit. Do you consent to use a telephone visit for your medical care today? Yes    Phone visit with patient and Dr. Rodriguez's with a history of successfully treated attention deficit disorder with Adderall XR 20 mg every morning with no side effects apparent.  Medications are reviewed and she is not taking Wellbutrin  mg nor she is taking meloxicam.  She is taking Synthroid 150 mcg daily which is continued.  Other medication includes trazodone 50 mg nightly.    She did receive a Medrol Dosepak in April with an onset of poison ivy which is resolved.    The following portions of the patient's history were reviewed and updated as appropriate: allergies, current medications, past social history and problem list.    Review of Systems   Constitutional: Negative.    HENT: Negative.    Eyes: Negative.    Respiratory: Negative.    Cardiovascular: Negative.    Gastrointestinal: Negative.    Endocrine: Negative.    Genitourinary: Negative.    Musculoskeletal: Negative.    Skin: Negative.    Allergic/Immunologic: Negative.    Neurological: Negative.    Hematological: Negative.    Psychiatric/Behavioral: Negative.        Objective   There were no vitals filed for this visit.  Physical Exam patient is very pleasant alert with fluent speech.  He is in no distress.    Assessment/Plan   Problem List Items Addressed This Visit        Endocrine    Hypothyroidism (acquired) - Primary       Other    Attention deficit disorder    Relevant Medications    amphetamine-dextroamphetamine XR (Adderall XR) 20 MG 24 hr capsule      1 month Adderall XR generic 20 mg daily is refilled.   Follow-up with Dr. Rodriguez on regular routine visit.

## 2020-05-14 NOTE — TELEPHONE ENCOUNTER
I have refilled the patient's medicines after the phone visit today.  This was Adderall XR generic 20 mg daily.

## 2020-06-15 RX ORDER — LEVOTHYROXINE SODIUM 150 MCG
TABLET ORAL
Qty: 30 TABLET | Refills: 2 | Status: SHIPPED | OUTPATIENT
Start: 2020-06-15 | End: 2020-09-28

## 2020-06-24 DIAGNOSIS — F90.9 ATTENTION DEFICIT HYPERACTIVITY DISORDER (ADHD), UNSPECIFIED ADHD TYPE: ICD-10-CM

## 2020-06-24 NOTE — TELEPHONE ENCOUNTER
PATIENT IS REQUESTING   amphetamine-dextroamphetamine XR (Adderall XR) 20 MG 24 hr capsule  20 mg, Every Morning   PATIENT IS ASKING IF SHE NEEDS TO  THE MEDICATION AT THE OFFICE OR AT [facility]  SHE STATED SHE PICKED IT UP AT THE [facility] OFFICE LAST TIME AND JUST WANTED TO VERIFY     GOOD CONTACT NUMBER   [phone] (H)              [facility] [address] [place] - [address] [facility] AT [address] - [phone] [facility] [phone]

## 2020-06-26 RX ORDER — DEXTROAMPHETAMINE SACCHARATE, AMPHETAMINE ASPARTATE MONOHYDRATE, DEXTROAMPHETAMINE SULFATE AND AMPHETAMINE SULFATE 5; 5; 5; 5 MG/1; MG/1; MG/1; MG/1
20 CAPSULE, EXTENDED RELEASE ORAL EVERY MORNING
Qty: 30 CAPSULE | Refills: 0 | Status: SHIPPED | OUTPATIENT
Start: 2020-06-26 | End: 2020-08-12 | Stop reason: SDUPTHER

## 2020-08-12 DIAGNOSIS — F90.9 ATTENTION DEFICIT HYPERACTIVITY DISORDER (ADHD), UNSPECIFIED ADHD TYPE: ICD-10-CM

## 2020-08-12 RX ORDER — DEXTROAMPHETAMINE SACCHARATE, AMPHETAMINE ASPARTATE MONOHYDRATE, DEXTROAMPHETAMINE SULFATE AND AMPHETAMINE SULFATE 5; 5; 5; 5 MG/1; MG/1; MG/1; MG/1
20 CAPSULE, EXTENDED RELEASE ORAL EVERY MORNING
Qty: 30 CAPSULE | Refills: 0 | Status: SHIPPED | OUTPATIENT
Start: 2020-08-12 | End: 2020-09-18 | Stop reason: SDUPTHER

## 2020-08-12 NOTE — TELEPHONE ENCOUNTER
Caller: Josefa Nazario    Relationship: Self    Best call back number: [phone]    Medication needed:   Requested Prescriptions     Pending Prescriptions Disp Refills   • amphetamine-dextroamphetamine XR (Adderall XR) 20 MG 24 hr capsule 30 capsule 0     Sig: Take 1 capsule by mouth Every Morning       When do you need the refill by:ASAP    What details did the patient provide when requesting the medication: OUT    Does the patient have less than a 3 day supply:  [x] Yes  [] No    What is the patient's preferred pharmacy:      Pharmacy:  [facility] [address] [phone] [place] [phone] FX [[id number]]

## 2020-08-30 DIAGNOSIS — G47.00 INSOMNIA, UNSPECIFIED TYPE: ICD-10-CM

## 2020-08-31 RX ORDER — TRAZODONE HYDROCHLORIDE 50 MG/1
TABLET ORAL
Qty: 30 TABLET | Refills: 2 | Status: SHIPPED | OUTPATIENT
Start: 2020-08-31 | End: 2020-12-01

## 2020-09-18 DIAGNOSIS — F90.9 ATTENTION DEFICIT HYPERACTIVITY DISORDER (ADHD), UNSPECIFIED ADHD TYPE: ICD-10-CM

## 2020-09-21 RX ORDER — DEXTROAMPHETAMINE SACCHARATE, AMPHETAMINE ASPARTATE MONOHYDRATE, DEXTROAMPHETAMINE SULFATE AND AMPHETAMINE SULFATE 5; 5; 5; 5 MG/1; MG/1; MG/1; MG/1
20 CAPSULE, EXTENDED RELEASE ORAL EVERY MORNING
Qty: 30 CAPSULE | Refills: 0 | Status: SHIPPED | OUTPATIENT
Start: 2020-09-21 | End: 2020-11-04 | Stop reason: SDUPTHER

## 2020-09-28 RX ORDER — LEVOTHYROXINE SODIUM 150 MCG
TABLET ORAL
Qty: 30 TABLET | Refills: 1 | Status: SHIPPED | OUTPATIENT
Start: 2020-09-28 | End: 2020-11-20 | Stop reason: DRUGHIGH

## 2020-11-04 DIAGNOSIS — F90.9 ATTENTION DEFICIT HYPERACTIVITY DISORDER (ADHD), UNSPECIFIED ADHD TYPE: ICD-10-CM

## 2020-11-04 RX ORDER — DEXTROAMPHETAMINE SACCHARATE, AMPHETAMINE ASPARTATE MONOHYDRATE, DEXTROAMPHETAMINE SULFATE AND AMPHETAMINE SULFATE 5; 5; 5; 5 MG/1; MG/1; MG/1; MG/1
20 CAPSULE, EXTENDED RELEASE ORAL EVERY MORNING
Qty: 30 CAPSULE | Refills: 0 | Status: SHIPPED | OUTPATIENT
Start: 2020-11-04 | End: 2020-12-14 | Stop reason: SDUPTHER

## 2020-11-04 NOTE — TELEPHONE ENCOUNTER
Needs to make an appointment       Caller: Josefa Nazario    Relationship: Self    Best call back number: [phone]  Medication needed:   Requested Prescriptions     Pending Prescriptions Disp Refills   • amphetamine-dextroamphetamine XR (Adderall XR) 20 MG 24 hr capsule 30 capsule 0     Sig: Take 1 capsule by mouth Every Morning       When do you need the refill by: ASAP    What details did the patient provide when requesting the medication: PT IS COMPLETELY OUT    Does the patient have less than a 3 day supply:  [x] Yes  [] No    What is the patient's preferred pharmacy: [facility] [address] [facility] AT [address] [phone] [facility] [phone]

## 2020-11-19 ENCOUNTER — OFFICE VISIT (OUTPATIENT)
Dept: INTERNAL MEDICINE | Facility: CLINIC | Age: 55
End: 2020-11-19

## 2020-11-19 VITALS
BODY MASS INDEX: 30.91 KG/M2 | OXYGEN SATURATION: 98 % | HEIGHT: 62 IN | WEIGHT: 168 LBS | SYSTOLIC BLOOD PRESSURE: 114 MMHG | DIASTOLIC BLOOD PRESSURE: 70 MMHG | HEART RATE: 72 BPM | TEMPERATURE: 98 F

## 2020-11-19 DIAGNOSIS — M21.942 DEFORMITY OF LEFT HAND: ICD-10-CM

## 2020-11-19 DIAGNOSIS — F90.9 ATTENTION DEFICIT HYPERACTIVITY DISORDER (ADHD), UNSPECIFIED ADHD TYPE: Primary | ICD-10-CM

## 2020-11-19 DIAGNOSIS — Z82.61 FAMILY HISTORY OF RHEUMATOID ARTHRITIS: ICD-10-CM

## 2020-11-19 DIAGNOSIS — E03.9 HYPOTHYROIDISM (ACQUIRED): ICD-10-CM

## 2020-11-19 DIAGNOSIS — M19.90 ARTHRITIS: ICD-10-CM

## 2020-11-19 LAB
BACTERIA UR QL AUTO: ABNORMAL /HPF
BILIRUB UR QL STRIP: NEGATIVE
CLARITY UR: CLEAR
COD CRY URNS QL: ABNORMAL /HPF
COLOR UR: YELLOW
ERYTHROCYTE [SEDIMENTATION RATE] IN BLOOD: 11 MM/HR (ref 0–20)
GLUCOSE UR STRIP-MCNC: NEGATIVE MG/DL
HGB UR QL STRIP.AUTO: NEGATIVE
HYALINE CASTS UR QL AUTO: ABNORMAL /LPF
KETONES UR QL STRIP: NEGATIVE
LEUKOCYTE ESTERASE UR QL STRIP.AUTO: ABNORMAL
MUCOUS THREADS URNS QL MICRO: ABNORMAL /HPF
NITRITE UR QL STRIP: NEGATIVE
PH UR STRIP.AUTO: 5.5 [PH] (ref 5–8)
PROT UR QL STRIP: NEGATIVE
RBC # UR: ABNORMAL /HPF
REF LAB TEST METHOD: ABNORMAL
SP GR UR STRIP: 1.02 (ref 1–1.03)
SQUAMOUS #/AREA URNS HPF: ABNORMAL /HPF
TRANS CELLS #/AREA URNS HPF: ABNORMAL /HPF
UROBILINOGEN UR QL STRIP: ABNORMAL
WBC UR QL AUTO: ABNORMAL /HPF

## 2020-11-19 PROCEDURE — 36415 COLL VENOUS BLD VENIPUNCTURE: CPT | Performed by: NURSE PRACTITIONER

## 2020-11-19 PROCEDURE — 81001 URINALYSIS AUTO W/SCOPE: CPT | Performed by: NURSE PRACTITIONER

## 2020-11-19 PROCEDURE — 99214 OFFICE O/P EST MOD 30 MIN: CPT | Performed by: NURSE PRACTITIONER

## 2020-11-19 PROCEDURE — 85651 RBC SED RATE NONAUTOMATED: CPT | Performed by: NURSE PRACTITIONER

## 2020-11-19 NOTE — PROGRESS NOTES
Subjective     Josefa Nazario is a 55 y.o. female.         She presents for routine follow-up for ADHD and hypothyroidism.  Patient feels well overall today and has 1 complaint of deformity to her left index finger that could possibly be RA.  Patient does have a history of RA, reports that her mother passed away earlier this year from RA.  Patient would like arthritis labs to be completed today.      Patient reports that her ADHD is well controlled with 20 mg of Adderall currently.  Patient reports her concentration is good.    We will also check thyroid labs that she has not had a check on these in over a year.  She currently takes 150 mcg Synthroid daily. Denies cold/heat intolerance. Weight is stable. Patient denies constipation. No changes in the skin, hair or nails.    Patient is normally followed by Dr. Rodriguez.       The following portions of the patient's history were reviewed and updated as appropriate: allergies, current medications, past social history and problem list.    Past Medical History:   Diagnosis Date   • Anxiety    • Attention deficit disorder    • Encounter for medication management    • Heart murmur    • Hypothyroidism    • Low back pain          Current Outpatient Medications:   •  amphetamine-dextroamphetamine XR (Adderall XR) 20 MG 24 hr capsule, Take 1 capsule by mouth Every Morning, Disp: 30 capsule, Rfl: 0  •  neomycin-polymyxin-hydrocortisone (CORTISPORIN) 3.5-57627-4 otic solution, Administer 3 drops into the left ear 4 (Four) Times a Day., Disp: 10 mL, Rfl: 0  •  Synthroid 150 MCG tablet, TAKE ONE TABLET BY MOUTH DAILY, Disp: 30 tablet, Rfl: 1  •  traZODone (DESYREL) 50 MG tablet, TAKE ONE TABLET BY MOUTH NIGHTLY, MAY INCREASE TO TWO TABLETS IF NEEDED, Disp: 30 tablet, Rfl: 2    No Known Allergies    Review of Systems   Constitutional: Negative for fatigue, fever and unexpected weight change.   Respiratory: Negative for shortness of breath.    Cardiovascular: Negative for chest  "pain, palpitations and leg swelling.   Endocrine: Negative for cold intolerance and heat intolerance.   Musculoskeletal: Positive for arthralgias.   Skin: Negative for color change and wound.   Psychiatric/Behavioral: Negative for decreased concentration and dysphoric mood. The patient is not nervous/anxious and is not hyperactive.        Objective     /70   Pulse 72   Temp 98 °F (36.7 °C)   Ht 157.5 cm (62\")   Wt 76.2 kg (168 lb)   SpO2 98%   BMI 30.73 kg/m²   Wt Readings from Last 3 Encounters:   11/19/20 76.2 kg (168 lb)   11/13/19 78 kg (172 lb)   11/04/19 78.9 kg (174 lb)     Temp Readings from Last 3 Encounters:   11/19/20 98 °F (36.7 °C)   11/13/19 98.5 °F (36.9 °C)   11/04/19 98.6 °F (37 °C)     BP Readings from Last 3 Encounters:   11/19/20 114/70   11/13/19 118/64   11/04/19 138/86     Pulse Readings from Last 3 Encounters:   11/19/20 72   11/13/19 107   11/04/19 80       Physical Exam  Vitals signs reviewed.   Constitutional:       Appearance: She is well-developed.   HENT:      Head: Normocephalic and atraumatic.   Cardiovascular:      Rate and Rhythm: Normal rate and regular rhythm.      Heart sounds: Normal heart sounds. No murmur.   Pulmonary:      Effort: Pulmonary effort is normal. No respiratory distress.      Breath sounds: Normal breath sounds.   Musculoskeletal: Normal range of motion.      Comments: Deformity of DIP joint to left index finger   Skin:     General: Skin is warm and dry.   Neurological:      Mental Status: She is alert.   Psychiatric:         Behavior: Behavior normal.         Thought Content: Thought content normal.         Judgment: Judgment normal.         Assessment/Plan     Diagnoses and all orders for this visit:    1. Attention deficit hyperactivity disorder (ADHD), unspecified ADHD type (Primary)  Comments:  Well-controlled with 20 mg of Adderall, patient already received refill earlier this month    2. Hypothyroidism (acquired)  Comments:  Continue 150 mcg " Synthroid, will adjust based on lab results if needed  Orders:  -     T3, Free  -     T4, Free  -     TSH  -     Comprehensive Metabolic Panel  -     Lipid Panel  -     Urinalysis With Culture If Indicated - Urine, Clean Catch  -     CBC & Differential    3. Arthritis  -     [facility] With / DsDNA, RNP, Sjogrens A / B, Smith  -     Rheumatoid Factor  -     C-reactive Protein  -     Sedimentation Rate  -     Uric Acid    4. Deformity of left hand  Comments:  Possibly due to RA  Orders:  -     [facility] With / DsDNA, RNP, Sjogrens A / B, Smith  -     Rheumatoid Factor  -     C-reactive Protein  -     Sedimentation Rate  -     Uric Acid    5. Family history of rheumatoid arthritis  Comments:  Mother recently passed away from RA and patient would like to recheck arthritis panel  Orders:  -     [facility] With / DsDNA, RNP, Sjogrens A / B, Smith  -     Rheumatoid Factor  -     C-reactive Protein  -     Sedimentation Rate  -     Uric Acid    Will return in 6 months for annual physical and follow-up with Dr. Rodriguez.

## 2020-11-20 LAB
ALBUMIN SERPL-MCNC: 4.4 G/DL (ref 3.5–5.2)
ALBUMIN/GLOB SERPL: 1.8 G/DL
ALP SERPL-CCNC: 88 U/L (ref 39–117)
ALT SERPL-CCNC: 30 U/L (ref 1–33)
ANA SER QL: NEGATIVE
AST SERPL-CCNC: 27 U/L (ref 1–32)
BASOPHILS # BLD AUTO: 0.05 10*3/MM3 (ref 0–0.2)
BASOPHILS NFR BLD AUTO: 0.8 % (ref 0–1.5)
BILIRUB SERPL-MCNC: 0.4 MG/DL (ref 0–1.2)
BUN SERPL-MCNC: 19 MG/DL (ref 6–20)
BUN/CREAT SERPL: 29.7 (ref 7–25)
CALCIUM SERPL-MCNC: 9.6 MG/DL (ref 8.6–10.5)
CHLORIDE SERPL-SCNC: 103 MMOL/L (ref 98–107)
CHOLEST SERPL-MCNC: 205 MG/DL (ref 0–200)
CO2 SERPL-SCNC: 25.2 MMOL/L (ref 22–29)
CREAT SERPL-MCNC: 0.64 MG/DL (ref 0.57–1)
CRP SERPL-MCNC: 0.38 MG/DL (ref 0–0.5)
EOSINOPHIL # BLD AUTO: 0.14 10*3/MM3 (ref 0–0.4)
EOSINOPHIL NFR BLD AUTO: 2.3 % (ref 0.3–6.2)
ERYTHROCYTE [DISTWIDTH] IN BLOOD BY AUTOMATED COUNT: 12.3 % (ref 12.3–15.4)
GLOBULIN SER CALC-MCNC: 2.4 GM/DL
GLUCOSE SERPL-MCNC: 88 MG/DL (ref 65–99)
HCT VFR BLD AUTO: 37.8 % (ref 34–46.6)
HDLC SERPL-MCNC: 60 MG/DL (ref 40–60)
HGB BLD-MCNC: 12.6 G/DL (ref 12–15.9)
IMM GRANULOCYTES # BLD AUTO: 0.02 10*3/MM3 (ref 0–0.05)
IMM GRANULOCYTES NFR BLD AUTO: 0.3 % (ref 0–0.5)
LDLC SERPL CALC-MCNC: 123 MG/DL (ref 0–100)
LYMPHOCYTES # BLD AUTO: 2.03 10*3/MM3 (ref 0.7–3.1)
LYMPHOCYTES NFR BLD AUTO: 33.2 % (ref 19.6–45.3)
MCH RBC QN AUTO: 28.9 PG (ref 26.6–33)
MCHC RBC AUTO-ENTMCNC: 33.3 G/DL (ref 31.5–35.7)
MCV RBC AUTO: 86.7 FL (ref 79–97)
MONOCYTES # BLD AUTO: 0.61 10*3/MM3 (ref 0.1–0.9)
MONOCYTES NFR BLD AUTO: 10 % (ref 5–12)
NEUTROPHILS # BLD AUTO: 3.26 10*3/MM3 (ref 1.7–7)
NEUTROPHILS NFR BLD AUTO: 53.4 % (ref 42.7–76)
NRBC BLD AUTO-RTO: 0 /100 WBC (ref 0–0.2)
PLATELET # BLD AUTO: 217 10*3/MM3 (ref 140–450)
POTASSIUM SERPL-SCNC: 4.8 MMOL/L (ref 3.5–5.2)
PROT SERPL-MCNC: 6.8 G/DL (ref 6–8.5)
RBC # BLD AUTO: 4.36 10*6/MM3 (ref 3.77–5.28)
RHEUMATOID FACT SERPL-ACNC: <10 IU/ML (ref 0–13.9)
SODIUM SERPL-SCNC: 139 MMOL/L (ref 136–145)
T3FREE SERPL-MCNC: 3.8 PG/ML (ref 2–4.4)
T4 FREE SERPL-MCNC: 1.91 NG/DL (ref 0.93–1.7)
TRIGL SERPL-MCNC: 123 MG/DL (ref 0–150)
TSH SERPL DL<=0.005 MIU/L-ACNC: 0.01 UIU/ML (ref 0.27–4.2)
URATE SERPL-MCNC: 5.4 MG/DL (ref 2.4–5.7)
VLDLC SERPL CALC-MCNC: 22 MG/DL (ref 5–40)
WBC # BLD AUTO: 6.11 10*3/MM3 (ref 3.4–10.8)

## 2020-11-20 RX ORDER — LEVOTHYROXINE SODIUM 137 MCG
137 TABLET ORAL DAILY
Qty: 30 TABLET | Refills: 3 | Status: SHIPPED | OUTPATIENT
Start: 2020-11-20 | End: 2021-04-22 | Stop reason: SDUPTHER

## 2020-11-20 NOTE — TELEPHONE ENCOUNTER
I called pt about labs and need for dose adjustment on Synthroid.  She would like prescription sent to [facility] in [place].  She said she needs brand name.   
98

## 2020-12-01 DIAGNOSIS — G47.00 INSOMNIA, UNSPECIFIED TYPE: ICD-10-CM

## 2020-12-01 RX ORDER — TRAZODONE HYDROCHLORIDE 50 MG/1
TABLET ORAL
Qty: 30 TABLET | Refills: 1 | Status: SHIPPED | OUTPATIENT
Start: 2020-12-01 | End: 2021-02-01

## 2020-12-14 DIAGNOSIS — F90.9 ATTENTION DEFICIT HYPERACTIVITY DISORDER (ADHD), UNSPECIFIED ADHD TYPE: ICD-10-CM

## 2020-12-14 RX ORDER — DEXTROAMPHETAMINE SACCHARATE, AMPHETAMINE ASPARTATE MONOHYDRATE, DEXTROAMPHETAMINE SULFATE AND AMPHETAMINE SULFATE 5; 5; 5; 5 MG/1; MG/1; MG/1; MG/1
20 CAPSULE, EXTENDED RELEASE ORAL EVERY MORNING
Qty: 30 CAPSULE | Refills: 0 | Status: SHIPPED | OUTPATIENT
Start: 2020-12-14 | End: 2021-02-05 | Stop reason: SDUPTHER

## 2020-12-14 NOTE — TELEPHONE ENCOUNTER
Caller: Josefa Nazario    Relationship: Self    Best call back number: [phone]    Medication needed:   Requested Prescriptions     Pending Prescriptions Disp Refills   • amphetamine-dextroamphetamine XR (Adderall XR) 20 MG 24 hr capsule 30 capsule 0     Sig: Take 1 capsule by mouth Every Morning       When do you need the refill by: ASAP    What details did the patient provide when requesting the medication: Has 2 days of medicine left.    Does the patient have less than a 3 day supply:  [x] Yes  [] No    What is the patient's preferred pharmacy: [facility] - [address] [facility] AT [address] [phone] [facility] [phone]

## 2020-12-22 ENCOUNTER — TELEPHONE (OUTPATIENT)
Dept: INTERNAL MEDICINE | Facility: CLINIC | Age: 55
End: 2020-12-22

## 2020-12-22 NOTE — TELEPHONE ENCOUNTER
PATIENT STATES: that her INS is saying that we have to call them saying why she needs this med amphetamine-dextroamphetamine XR (Adderall XR) 20 MG 24 hr capsule. She was told this info by her pharmacy. Her pharmacy saying she cant get med until it happens. The INS number is [phone]  please advise     PATIENT CAN BE REACHED ON:[phone]    PHARMACY [address] [facility] AT [address] - [phone]  - [phone]   [phone]

## 2020-12-23 NOTE — TELEPHONE ENCOUNTER
I left message with [facility] pharmacy to fax us the PA information.  I did try to submit on covermymeds with [place], but received reply that not found with the information.

## 2021-02-01 DIAGNOSIS — G47.00 INSOMNIA, UNSPECIFIED TYPE: ICD-10-CM

## 2021-02-01 RX ORDER — TRAZODONE HYDROCHLORIDE 50 MG/1
TABLET ORAL
Qty: 30 TABLET | Refills: 0 | Status: SHIPPED | OUTPATIENT
Start: 2021-02-01 | End: 2021-03-16

## 2021-02-05 ENCOUNTER — TELEPHONE (OUTPATIENT)
Dept: INTERNAL MEDICINE | Facility: CLINIC | Age: 56
End: 2021-02-05

## 2021-02-05 DIAGNOSIS — F90.9 ATTENTION DEFICIT HYPERACTIVITY DISORDER (ADHD), UNSPECIFIED ADHD TYPE: ICD-10-CM

## 2021-02-05 RX ORDER — DEXTROAMPHETAMINE SACCHARATE, AMPHETAMINE ASPARTATE MONOHYDRATE, DEXTROAMPHETAMINE SULFATE AND AMPHETAMINE SULFATE 5; 5; 5; 5 MG/1; MG/1; MG/1; MG/1
20 CAPSULE, EXTENDED RELEASE ORAL EVERY MORNING
Qty: 30 CAPSULE | Refills: 0 | Status: SHIPPED | OUTPATIENT
Start: 2021-02-05 | End: 2021-03-11 | Stop reason: SDUPTHER

## 2021-02-05 NOTE — TELEPHONE ENCOUNTER
Caller: Josefa Nazario    Relationship: Self    Best call back number: [phone]    Medication needed:   Requested Prescriptions     Pending Prescriptions Disp Refills   • amphetamine-dextroamphetamine XR (Adderall XR) 20 MG 24 hr capsule 30 capsule 0     Sig: Take 1 capsule by mouth Every Morning       When do you need the refill by: ASAP    What details did the patient provide when requesting the medication: 2 PILLS LEFT    Does the patient have less than a 3 day supply:  [x] Yes  [] No    What is the patient's preferred pharmacy: [facility] [phone] [facility] [phone]

## 2021-03-11 DIAGNOSIS — F90.9 ATTENTION DEFICIT HYPERACTIVITY DISORDER (ADHD), UNSPECIFIED ADHD TYPE: ICD-10-CM

## 2021-03-11 NOTE — TELEPHONE ENCOUNTER
She needs to make an appointment for further refills      Caller: Josefa Nazario    Relationship: Self    Best call back number: [phone]    Medication needed:   Requested Prescriptions     Pending Prescriptions Disp Refills   • amphetamine-dextroamphetamine XR (Adderall XR) 20 MG 24 hr capsule 30 capsule 0     Sig: Take 1 capsule by mouth Every Morning       When do you need the refill by: ASAP        Does the patient have less than a 3 day supply:  [x] Yes  [] No    What is the patient's preferred pharmacy: [facility] [address] [phone] [facility] [phone]

## 2021-03-12 RX ORDER — DEXTROAMPHETAMINE SACCHARATE, AMPHETAMINE ASPARTATE MONOHYDRATE, DEXTROAMPHETAMINE SULFATE AND AMPHETAMINE SULFATE 5; 5; 5; 5 MG/1; MG/1; MG/1; MG/1
20 CAPSULE, EXTENDED RELEASE ORAL EVERY MORNING
Qty: 30 CAPSULE | Refills: 0 | Status: SHIPPED | OUTPATIENT
Start: 2021-03-12 | End: 2021-04-20 | Stop reason: SDUPTHER

## 2021-03-15 DIAGNOSIS — G47.00 INSOMNIA, UNSPECIFIED TYPE: ICD-10-CM

## 2021-03-16 RX ORDER — TRAZODONE HYDROCHLORIDE 50 MG/1
TABLET ORAL
Qty: 30 TABLET | Refills: 0 | Status: SHIPPED | OUTPATIENT
Start: 2021-03-16 | End: 2021-04-21

## 2021-03-17 ENCOUNTER — IMMUNIZATION (OUTPATIENT)
Dept: VACCINE CLINIC | Facility: HOSPITAL | Age: 56
End: 2021-03-17

## 2021-03-17 PROCEDURE — 0001A: CPT | Performed by: OBSTETRICS & GYNECOLOGY

## 2021-03-17 PROCEDURE — 91300 HC SARSCOV02 VAC 30MCG/0.3ML IM: CPT | Performed by: OBSTETRICS & GYNECOLOGY

## 2021-04-05 ENCOUNTER — APPOINTMENT (OUTPATIENT)
Dept: WOMENS IMAGING | Facility: HOSPITAL | Age: 56
End: 2021-04-05

## 2021-04-05 PROCEDURE — 77063 BREAST TOMOSYNTHESIS BI: CPT | Performed by: RADIOLOGY

## 2021-04-05 PROCEDURE — 77067 SCR MAMMO BI INCL CAD: CPT | Performed by: RADIOLOGY

## 2021-04-07 ENCOUNTER — IMMUNIZATION (OUTPATIENT)
Dept: VACCINE CLINIC | Facility: HOSPITAL | Age: 56
End: 2021-04-07

## 2021-04-07 PROCEDURE — 91300 HC SARSCOV02 VAC 30MCG/0.3ML IM: CPT | Performed by: OBSTETRICS & GYNECOLOGY

## 2021-04-07 PROCEDURE — 0002A: CPT | Performed by: OBSTETRICS & GYNECOLOGY

## 2021-04-19 DIAGNOSIS — G47.00 INSOMNIA, UNSPECIFIED TYPE: ICD-10-CM

## 2021-04-19 NOTE — TELEPHONE ENCOUNTER
Caller: MansiJosefa fuentes    Relationship: Self    Best call back number: [phone] (H)    Medication needed:   amphetamine-dextroamphetamine XR (Adderall XR) 20 MG 24 hr capsule  20 mg, Every Morning 0 ordered         Summary: Take 1 capsule by mouth Every Morning, Starting Fri 3/12/2021, Normal            When do you need the refill by: 04/21/21    What additional details did the patient provide when requesting the medication: PATIENT CALLED TO REQUEST A MEDICATION REFILL ON HER MEDICATION. PATIENT STATES THAT HE HAS A 3 DAY SUPPLY LEFT.            Does the patient have less than a 3 day supply:  [] Yes  [x] No    What is the patient's preferred pharmacy: [address] [facility] AT [address] [phone] [facility] [phone] FX       THANKS

## 2021-04-20 DIAGNOSIS — F90.9 ATTENTION DEFICIT HYPERACTIVITY DISORDER (ADHD), UNSPECIFIED ADHD TYPE: ICD-10-CM

## 2021-04-20 RX ORDER — DEXTROAMPHETAMINE SACCHARATE, AMPHETAMINE ASPARTATE MONOHYDRATE, DEXTROAMPHETAMINE SULFATE AND AMPHETAMINE SULFATE 5; 5; 5; 5 MG/1; MG/1; MG/1; MG/1
20 CAPSULE, EXTENDED RELEASE ORAL EVERY MORNING
Qty: 30 CAPSULE | Refills: 0 | Status: SHIPPED | OUTPATIENT
Start: 2021-04-20 | End: 2021-05-27 | Stop reason: SDUPTHER

## 2021-04-21 RX ORDER — TRAZODONE HYDROCHLORIDE 50 MG/1
TABLET ORAL
Qty: 30 TABLET | Refills: 0 | Status: SHIPPED | OUTPATIENT
Start: 2021-04-21 | End: 2021-05-21

## 2021-04-21 NOTE — TELEPHONE ENCOUNTER
Caller: Josefa Nazario    Relationship: Self    Best call back number: [phone] (H)    Medication needed:   Requested Prescriptions     Pending Prescriptions Disp Refills   • traZODone (DESYREL) 50 MG tablet [Pharmacy Med Name: traZODone 50 MG TABLET] 30 tablet 0     Sig: TAKE ONE TABLET BY MOUTH ONCE NIGHTLY MAY INCREASE TO 2 TABLETS DAILY AS NEEDED     Synthroid 137 MCG tablet  137 mcg, Daily 3 ordered         Summary: Take 1 tablet by mouth Daily., Starting Fri 11/20/2020, Normal            When do you need the refill by: ASAP    What additional details did the patient provide when requesting the medication: PATIENT CALLED TO REQUEST A MEDICATION REFILL ON HER MEDICATION. PATIENT STATES THAT SHE IS COMPLETELY OUT OF MEDICATION.            Does the patient have less than a 3 day supply:  [] Yes  [] No    What is the patient's preferred pharmacy: [address] [facility] AT [address] [phone] [place] [phone] FX           DELETE AFTER READING TO PATIENT: “Thank you for sharing this information with me. I will send a message to the clinical team. Please allow 48 hours for the clinical staff to follow up on this request.”

## 2021-04-21 NOTE — TELEPHONE ENCOUNTER
Pt has not been seen by you since 11/13/2019  Last OV in office W/EVELINA Gay 11/19/2020  Next OV- None scheduled.    Please advise?

## 2021-04-22 RX ORDER — LEVOTHYROXINE SODIUM 137 MCG
137 TABLET ORAL DAILY
Qty: 30 TABLET | Refills: 0 | Status: SHIPPED | OUTPATIENT
Start: 2021-04-22 | End: 2021-05-21

## 2021-04-22 NOTE — TELEPHONE ENCOUNTER
Last OV w/Noemy Hernandez, EVELINA 11/19/2020  Next OV- Not scheduled   Last OV with Dr. Rodriguez 11/13/2019   Pt needs an appt

## 2021-04-22 NOTE — TELEPHONE ENCOUNTER
Caller: Josefa Nazario    Relationship: Self    Best call back number: [phone]     Medication needed:   Requested Prescriptions     Pending Prescriptions Disp Refills   • Synthroid 137 MCG tablet 30 tablet 3     Sig: Take 1 tablet by mouth Daily.       When do you need the refill by: 04/22/21    Does the patient have less than a 3 day supply:  [x] Yes  [] No    What is the patient's preferred pharmacy: [address] [phone] [facility] [phone]

## 2021-05-20 ENCOUNTER — TELEPHONE (OUTPATIENT)
Dept: INTERNAL MEDICINE | Facility: CLINIC | Age: 56
End: 2021-05-20

## 2021-05-20 DIAGNOSIS — G47.00 INSOMNIA, UNSPECIFIED TYPE: ICD-10-CM

## 2021-05-20 NOTE — TELEPHONE ENCOUNTER
PATIENT CALLED AGAIN IN REGARDS TO LAB ORDER FOR MEDICATION OF     Synthroid 137 MCG tablet    SHE HAS 5 TABLETS LEFT.     [facility] [address], [place] - [address] [facility] AT [address] - [phone]  - [phone]   [phone]    CALL BACK NUMBER [phone]    LAST VISIT 11/19/20

## 2021-05-20 NOTE — TELEPHONE ENCOUNTER
Caller: Josefa Nazario    Relationship: Self    Best call back number: [phone]    What orders are you requesting (i.e. lab or imaging): LAB ORDERS    In what timeframe would the patient need to come in: ASAP, BEFORE HER MEDICATION RUNS OUT.    Where will you receive your lab/imaging services: IN OFFICE, [facility]     Additional notes:     PLEASE CALL PATIENT TODAY TO GET LABS SCHEDULED, PER HER REQUEST    MESSAGE FOR DR RUELAS:     traZODone (DESYREL) 50 MG tablet FOR OVER A YEAR PATIENT HAS BEEN TAKING THIS MEDICATION, EVERY MONTH SHE HAS TO CALL FOR REFILLS- IT TAKES US 5 DAYS TO GET BACK TO HER, FROM OUR OFFICE. AND SHE IS WANTING TO GET PLACED ON THE MAIL ORDER FOR THIS.      [facility] [place] [place] - [address] AT Portal to [facility] - [phone]  - [phone] FX

## 2021-05-21 RX ORDER — TRAZODONE HYDROCHLORIDE 50 MG/1
TABLET ORAL
Qty: 30 TABLET | Refills: 0 | Status: SHIPPED | OUTPATIENT
Start: 2021-05-21 | End: 2021-06-11 | Stop reason: SDUPTHER

## 2021-05-21 RX ORDER — LEVOTHYROXINE SODIUM 137 MCG
TABLET ORAL
Qty: 30 TABLET | Refills: 0 | Status: SHIPPED | OUTPATIENT
Start: 2021-05-21 | End: 2021-07-06

## 2021-05-21 NOTE — TELEPHONE ENCOUNTER
Detailed message left per  she will need to see her in the office for a visit and after that visit she will order an needed labs and medication will be reviewed for a refill at the time of her visit w/ provider.    DAMIAN hernandez to inform and schedule appointment w/. Dr. Rodriguez

## 2021-05-21 NOTE — TELEPHONE ENCOUNTER
PATIENT CALLED TO SCHEDULE FOLLOW UP FOR NEXT AVAILABLE 06/09. SHE IS GOING TO RUN OUT OF HER MEDICATION AND WOULD LIKE A CALL BACK. [phone]

## 2021-05-27 DIAGNOSIS — F90.9 ATTENTION DEFICIT HYPERACTIVITY DISORDER (ADHD), UNSPECIFIED ADHD TYPE: ICD-10-CM

## 2021-05-27 RX ORDER — DEXTROAMPHETAMINE SACCHARATE, AMPHETAMINE ASPARTATE MONOHYDRATE, DEXTROAMPHETAMINE SULFATE AND AMPHETAMINE SULFATE 5; 5; 5; 5 MG/1; MG/1; MG/1; MG/1
20 CAPSULE, EXTENDED RELEASE ORAL EVERY MORNING
Qty: 30 CAPSULE | Refills: 0 | Status: SHIPPED | OUTPATIENT
Start: 2021-05-27 | End: 2021-07-22 | Stop reason: SDUPTHER

## 2021-05-27 NOTE — TELEPHONE ENCOUNTER
Caller: Josefa Nazario    Relationship: Self    Best call back number: [phone]      Medication needed:   Requested Prescriptions     Pending Prescriptions Disp Refills   • amphetamine-dextroamphetamine XR (Adderall XR) 20 MG 24 hr capsule 30 capsule 0     Sig: Take 1 capsule by mouth Every Morning       When do you need the refill by: ASAP    What additional details did the patient provide when requesting the medication: PATIENT STATED THAT SHE HAS 5 PILLS LEFT.     Does the patient have less than a 3 day supply:  [] Yes  [x] No    What is the patient's preferred pharmacy: [facility] [address] [facility] AT [address] [phone] [facility] [phone]

## 2021-06-11 ENCOUNTER — OFFICE VISIT (OUTPATIENT)
Dept: INTERNAL MEDICINE | Facility: CLINIC | Age: 56
End: 2021-06-11

## 2021-06-11 VITALS
DIASTOLIC BLOOD PRESSURE: 72 MMHG | BODY MASS INDEX: 29.81 KG/M2 | SYSTOLIC BLOOD PRESSURE: 134 MMHG | HEART RATE: 91 BPM | WEIGHT: 162 LBS | OXYGEN SATURATION: 97 % | TEMPERATURE: 98.1 F | HEIGHT: 62 IN

## 2021-06-11 DIAGNOSIS — F90.9 ATTENTION DEFICIT HYPERACTIVITY DISORDER (ADHD), UNSPECIFIED ADHD TYPE: ICD-10-CM

## 2021-06-11 DIAGNOSIS — G47.00 INSOMNIA, UNSPECIFIED TYPE: ICD-10-CM

## 2021-06-11 DIAGNOSIS — E03.9 HYPOTHYROIDISM (ACQUIRED): Primary | ICD-10-CM

## 2021-06-11 PROCEDURE — 99214 OFFICE O/P EST MOD 30 MIN: CPT | Performed by: INTERNAL MEDICINE

## 2021-06-11 RX ORDER — TRAZODONE HYDROCHLORIDE 50 MG/1
50 TABLET ORAL NIGHTLY
Qty: 30 TABLET | Refills: 5 | Status: SHIPPED | OUTPATIENT
Start: 2021-06-11 | End: 2021-12-31

## 2021-06-11 NOTE — PROGRESS NOTES
Chief Complaint   Patient presents with   • Hypothyroidism     follow up   • ADD       Subjective   Josefa Nazario is a 55 y.o. female.     History of Present Illness        Hypothyroidism:  This is a chronic problem.  Current treatment: Levothyroxine.  By report, there is good compliance with treatment, good tolerance of treatment and good symptom control.   Lab Results   Component Value Date    TSH 0.015 (L) 11/19/2020     Levothyroxine was reduced from 150 to 137 mcg with this result.     ADD:  She comes in for follow up of ADD.  This is a chronic problem.  Symptoms include easy distractibility, poor concentration, short attention span, forgetfulness.  This happens at work.  Sometimes also at home.  Symptoms are moderate in severity.  Symptoms are controlled.  Exacerbating factors include fatigue, distracting activities.  Relieving factors include stimulant medication.  Associated symptoms negative for anorexia, weight loss, anxiety, irritability  Current treatment stimulant medication.By report, there is good compliance with treatment, good tolerance of treatment and good symptom control.  She has some problems with insomnia. Trazodone helps      The following portions of the patient's history were reviewed and updated as appropriate: allergies, current medications, past family history, past medical history, past social history, past surgical history and problem list.         Current Outpatient Medications:   •  amphetamine-dextroamphetamine XR (Adderall XR) 20 MG 24 hr capsule, Take 1 capsule by mouth Every Morning, Disp: 30 capsule, Rfl: 0  •  Synthroid 137 MCG tablet, TAKE ONE TABLET BY MOUTH DAILY, Disp: 30 tablet, Rfl: 0  •  traZODone (DESYREL) 50 MG tablet, Take 1 tablet by mouth Every Night., Disp: 30 tablet, Rfl: 5      Review of Systems   Constitutional: Negative for appetite change.   HENT: Negative for nosebleeds.    Eyes: Negative for blurred vision and double vision.   Respiratory: Negative for  "cough and shortness of breath.    Cardiovascular: Negative for chest pain, palpitations and leg swelling.           Objective     /72   Pulse 91   Temp 98.1 °F (36.7 °C)   Ht 157.5 cm (62\")   Wt 73.5 kg (162 lb)   LMP 02/22/2019   SpO2 97%   BMI 29.63 kg/m²       Physical Exam  Vitals and nursing note reviewed.   Constitutional:       General: She is not in acute distress.     Appearance: She is well-developed.   Neck:      Vascular: Normal carotid pulses. No carotid bruit.   Cardiovascular:      Rate and Rhythm: Regular rhythm.      Pulses:           Carotid pulses are 2+ on the right side and 2+ on the left side.     Heart sounds: S1 normal and S2 normal. No murmur heard.   No friction rub. No gallop.    Pulmonary:      Effort: Pulmonary effort is normal.      Breath sounds: Normal breath sounds. No wheezing, rhonchi or rales.   Musculoskeletal:      Right lower leg: No edema.      Left lower leg: No edema.   Skin:     General: Skin is warm and dry.   Neurological:      Mental Status: She is alert and oriented to person, place, and time.           Assessment/Plan   Diagnoses and all orders for this visit:    1. Hypothyroidism (acquired) (Primary)  -     T3, Free  -     T4, Free  -     TSH    2. Attention deficit hyperactivity disorder (ADHD), unspecified ADHD type  Comments:  Continue Adderall XR    3. Insomnia, unspecified type  -     traZODone (DESYREL) 50 MG tablet; Take 1 tablet by mouth Every Night.  Dispense: 30 tablet; Refill: 5      She will continue same medications.  Encouraged prudent diet and regular exercise.   Discussed health maintenance.  She sees gyn for paps, mammograms, bone density and states she is up to date on these. She reports she is taking hormone replacement.           "

## 2021-06-12 LAB
T3FREE SERPL-MCNC: 2.5 PG/ML (ref 2–4.4)
T4 FREE SERPL-MCNC: 1.71 NG/DL (ref 0.82–1.77)
TSH SERPL DL<=0.005 MIU/L-ACNC: 0.55 UIU/ML (ref 0.45–4.5)

## 2021-07-06 RX ORDER — LEVOTHYROXINE SODIUM 137 MCG
TABLET ORAL
Qty: 30 TABLET | Refills: 0 | Status: SHIPPED | OUTPATIENT
Start: 2021-07-06 | End: 2021-07-07 | Stop reason: SDUPTHER

## 2021-07-06 NOTE — TELEPHONE ENCOUNTER
Patient called the office today for her prescription of Synthroid 137 MCG tablet Patient is wanting a 90 day prescription and she seen Dr Rodriguez on 06/11/21

## 2021-07-07 RX ORDER — LEVOTHYROXINE SODIUM 137 MCG
137 TABLET ORAL DAILY
Qty: 90 TABLET | Refills: 2 | Status: SHIPPED | OUTPATIENT
Start: 2021-07-07 | End: 2022-08-11 | Stop reason: SDUPTHER

## 2021-07-22 DIAGNOSIS — F90.9 ATTENTION DEFICIT HYPERACTIVITY DISORDER (ADHD), UNSPECIFIED ADHD TYPE: ICD-10-CM

## 2021-07-22 RX ORDER — DEXTROAMPHETAMINE SACCHARATE, AMPHETAMINE ASPARTATE MONOHYDRATE, DEXTROAMPHETAMINE SULFATE AND AMPHETAMINE SULFATE 5; 5; 5; 5 MG/1; MG/1; MG/1; MG/1
20 CAPSULE, EXTENDED RELEASE ORAL EVERY MORNING
Qty: 30 CAPSULE | Refills: 0 | Status: SHIPPED | OUTPATIENT
Start: 2021-07-22 | End: 2021-09-09 | Stop reason: SDUPTHER

## 2021-07-22 NOTE — TELEPHONE ENCOUNTER
Caller: Josefa Nazario    Relationship: Self    Best call back number: [phone] (H)    Medication needed:   amphetamine-dextroamphetamine XR (Adderall XR) 20 MG 24 hr capsule    When do you need the refill by: 07/23/21    What additional details did the patient provide when requesting the medication: PATIENT CALLED TO REQUEST A MEDICATION REFILL ON HER MEDICATION. PATIENT STATES THAT HE HAS A 3 DAY SUPPLY LEFT.        Does the patient have less than a 3 day supply:  [] Yes  [x] No    What is the patient's preferred pharmacy:    [facility] [address] [facility] AT [address] - [phone] [facility] [phone] FX      THANKS

## 2021-09-09 DIAGNOSIS — F90.9 ATTENTION DEFICIT HYPERACTIVITY DISORDER (ADHD), UNSPECIFIED ADHD TYPE: ICD-10-CM

## 2021-09-09 RX ORDER — DEXTROAMPHETAMINE SACCHARATE, AMPHETAMINE ASPARTATE MONOHYDRATE, DEXTROAMPHETAMINE SULFATE AND AMPHETAMINE SULFATE 5; 5; 5; 5 MG/1; MG/1; MG/1; MG/1
20 CAPSULE, EXTENDED RELEASE ORAL EVERY MORNING
Qty: 30 CAPSULE | Refills: 0 | Status: SHIPPED | OUTPATIENT
Start: 2021-09-09 | End: 2021-11-01 | Stop reason: SDUPTHER

## 2021-09-09 NOTE — TELEPHONE ENCOUNTER
Caller: Josefa Nazario    Relationship: Self    Best call back number:[phone]    Medication needed:   Requested Prescriptions     Pending Prescriptions Disp Refills   • amphetamine-dextroamphetamine XR (Adderall XR) 20 MG 24 hr capsule 30 capsule 0     Sig: Take 1 capsule by mouth Every Morning       When do you need the refill by: ASAP    What additional details did the patient provide when requesting the medication: THE PATIENT HAS ONE DAY LEFT OF THE MEDICATION     Does the patient have less than a 3 day supply:  [x] Yes  [] No    What is the patient's preferred pharmacy: [facility] [address] [facility] AT [address] [phone] [facility] [phone]

## 2021-09-09 NOTE — TELEPHONE ENCOUNTER
Please call pt advise she needs to make an appt with her pcp to get any additional refills after this one.

## 2021-10-29 ENCOUNTER — OFFICE VISIT (OUTPATIENT)
Dept: INTERNAL MEDICINE | Facility: CLINIC | Age: 56
End: 2021-10-29

## 2021-10-29 VITALS
WEIGHT: 171 LBS | RESPIRATION RATE: 18 BRPM | DIASTOLIC BLOOD PRESSURE: 89 MMHG | BODY MASS INDEX: 30.3 KG/M2 | HEIGHT: 63 IN | HEART RATE: 92 BPM | OXYGEN SATURATION: 99 % | TEMPERATURE: 98.2 F | SYSTOLIC BLOOD PRESSURE: 135 MMHG

## 2021-10-29 DIAGNOSIS — F90.9 ATTENTION DEFICIT HYPERACTIVITY DISORDER (ADHD), UNSPECIFIED ADHD TYPE: ICD-10-CM

## 2021-10-29 DIAGNOSIS — F41.9 ANXIETY: Primary | ICD-10-CM

## 2021-10-29 PROCEDURE — 99213 OFFICE O/P EST LOW 20 MIN: CPT | Performed by: NURSE PRACTITIONER

## 2021-10-29 RX ORDER — NORETHINDRONE ACETATE AND ETHINYL ESTRADIOL 1; 5 MG/1; UG/1
TABLET ORAL
COMMUNITY
Start: 2021-10-27 | End: 2023-01-30

## 2021-10-29 RX ORDER — EPINEPHRINE 0.3 MG/.3ML
INJECTION SUBCUTANEOUS
COMMUNITY
Start: 2021-10-11 | End: 2022-08-18

## 2021-10-29 NOTE — PROGRESS NOTES
"Chief Complaint  Med Refill (Pt presents here today for a medication refill.)    Subjective          Josefa Nazario presents to [facility]  History of Present Illness    Patient is a pleasant 56-year-old female who typically sees Dr. Rodriguez here in the office.  Patient is new to me and she is here today to receive a new prescription for her attention deficit disorder/Adderall XR 20 mg every 24 hour capsule.  Patient states she is doing well and she has been on this medication for quite some time and denies any side effects of the medication.    Patient is also here inquiring to be placed on Zoloft 25 mg daily due to her panic attacks that she is experiencing due to the death of her father recently.  At this time patient denies any thoughts of self-harm or harm to others.    Objective   Vital Signs:   /89 (BP Location: Right arm, Patient Position: Sitting, Cuff Size: Adult)   Pulse 92   Temp 98.2 °F (36.8 °C)   Resp 18   Ht 158.8 cm (62.5\")   Wt 77.6 kg (171 lb)   SpO2 99%   BMI 30.78 kg/m²     Physical Exam  Vitals and nursing note reviewed.   Constitutional:       Appearance: Normal appearance.   HENT:      Head: Normocephalic.      Right Ear: Tympanic membrane normal.      Left Ear: Tympanic membrane normal.      Nose: Nose normal.      Mouth/Throat:      Mouth: Mucous membranes are moist.   Eyes:      Pupils: Pupils are equal, round, and reactive to light.   Cardiovascular:      Rate and Rhythm: Normal rate and regular rhythm.      Pulses: Normal pulses.      Heart sounds: Normal heart sounds.      Comments: No peripheral edema noted.   Pulmonary:      Effort: Pulmonary effort is normal. No respiratory distress.      Breath sounds: Normal breath sounds. No stridor. No wheezing, rhonchi or rales.   Chest:      Chest wall: No tenderness.   Musculoskeletal:         General: Normal range of motion.      Cervical back: Normal range of motion.   Skin:     General: Skin is " warm.      Capillary Refill: Capillary refill takes less than 2 seconds.   Neurological:      Mental Status: She is alert and oriented to person, place, and time.   Psychiatric:      Comments: Patient states she is more anxious and having movements of panic after the death of her father. She denies any thoughts of self harm/harm to others.         Result Review :            Office Visit with Julia Rodriguez MD (06/11/2021)  Office Visit with Anu Miranda APRN (03/06/2019)       Assessment and Plan    Diagnoses and all orders for this visit:    1. Anxiety (Primary)    2. Attention deficit hyperactivity disorder (ADHD), unspecified ADHD type    Patient will continue taking her Adderall as needed for her attention deficit disorder.  I will contact her PCP to request a refill of this medication.  We did discuss in our office visit that Dr. Rodriguez is retiring and she needs to get in with a provider who can prescribe this medication for her in the future.  She agrees with this treatment plan at this time.  I will also speak to her PCP about starting this patient on Zoloft as she is already taking trazodone and Adderall.  If patient has worsening symptoms of panic which can include chest pain/chest pressure, shortness of breath she needs to go to the emergency room.  Patient verbalizes understanding of treatment plan at this time.    Follow Up   Return in about 3 months (around 1/29/2022) for Recheck.  Patient was given instructions and counseling regarding her condition or for health maintenance advice. Please see specific information pulled into the AVS if appropriate.

## 2021-10-29 NOTE — PATIENT INSTRUCTIONS
"[email or web address]   Generalized Anxiety Disorder, Adult  Generalized anxiety disorder (EMELYN) is a mental health condition. Unlike normal worries, anxiety related to EMELYN is not triggered by a specific event. These worries do not fade or get better with time. EMELYN interferes with relationships, work, and school.  EMELYN symptoms can vary from mild to severe. People with severe EMELYN can have intense waves of anxiety with physical symptoms that are similar to panic attacks.  What are the causes?  The exact cause of EMELYN is not known, but the following are believed to have an impact:  · Differences in natural brain chemicals.  · Genes passed down from parents to children.  · Differences in the way threats are perceived.  · Development during childhood.  · Personality.  What increases the risk?  The following factors may make you more likely to develop this condition:  · Being female.  · Having a family history of anxiety disorders.  · Being very shy.  · Experiencing very stressful life events, such as the death of a loved one.  · Having a very stressful family environment.  What are the signs or symptoms?  People with EMELYN often worry excessively about many things in their lives, such as their health and family. Symptoms may also include:  · Mental and emotional symptoms:  ? Worrying excessively about natural disasters.  ? Fear of being late.  ? Difficulty concentrating.  ? Fears that others are judging your performance.  · Physical symptoms:  ? Fatigue.  ? Headaches, muscle tension, muscle twitches, trembling, or feeling shaky.  ? Feeling like your heart is pounding or beating very fast.  ? Feeling out of breath or like you cannot take a deep breath.  ? Having trouble falling asleep or staying asleep, or experiencing restlessness.  ? Sweating.  ? Nausea, diarrhea, or irritable bowel syndrome (IBS).  · Behavioral symptoms:  ? Experiencing erratic moods or irritability.  ? Avoidance of new situations.  ? Avoidance of " people.  ? Extreme difficulty making decisions.  How is this diagnosed?  This condition is diagnosed based on your symptoms and medical history. You will also have a physical exam. Your health care provider may perform tests to rule out other possible causes of your symptoms.  To be diagnosed with EMELYN, a person must have anxiety that:  · Is out of his or her control.  · Affects several different aspects of his or her life, such as work and relationships.  · Causes distress that makes him or her unable to take part in normal activities.  · Includes at least three symptoms of EMELYN, such as restlessness, fatigue, trouble concentrating, irritability, muscle tension, or sleep problems.  Before your health care provider can confirm a diagnosis of EMELYN, these symptoms must be present more days than they are not, and they must last for 6 months or longer.  How is this treated?  This condition may be treated with:  · Medicine. Antidepressant medicine is usually prescribed for long-term daily control. Anti-anxiety medicines may be added in severe cases, especially when panic attacks occur.  · Talk therapy (psychotherapy). Certain types of talk therapy can be helpful in treating EMELYN by providing support, education, and guidance. Options include:  ? Cognitive behavioral therapy (CBT). People learn coping skills and self-calming techniques to ease their physical symptoms. They learn to identify unrealistic thoughts and behaviors and to replace them with more appropriate thoughts and behaviors.  ? Acceptance and commitment therapy (ACT). This treatment teaches people how to be mindful as a way to cope with unwanted thoughts and feelings.  ? Biofeedback. This process trains you to manage your body's response (physiological response) through breathing techniques and relaxation methods. You will work with a therapist while machines are used to monitor your physical symptoms.  · Stress management techniques. These include yoga,  meditation, and exercise.  A mental health specialist can help determine which treatment is best for you. Some people see improvement with one type of therapy. However, other people require a combination of therapies.  Follow these instructions at home:  Lifestyle  · Maintain a consistent routine and schedule.  · Anticipate stressful situations. Create a plan, and allow extra time to work with your plan.  · Practice stress management or self-calming techniques that you have learned from your therapist or your health care provider.  General instructions  · Take over-the-counter and prescription medicines only as told by your health care provider.  · Understand that you are likely to have setbacks. Accept this and be kind to yourself as you persist to take better care of yourself.  · Recognize and accept your accomplishments, even if you  them as small.  · Keep all follow-up visits as told by your health care provider. This is important.  Contact a health care provider if:  · Your symptoms do not get better.  · Your symptoms get worse.  · You have signs of depression, such as:  ? A persistently sad or irritable mood.  ? Loss of enjoyment in activities that used to bring you katerina.  ? Change in weight or eating.  ? Changes in sleeping habits.  ? Avoiding friends or family members.  ? Loss of energy for normal tasks.  ? Feelings of guilt or worthlessness.  Get help right away if:  · You have serious thoughts about hurting yourself or others.  If you ever feel like you may hurt yourself or others, or have thoughts about taking your own life, get help right away. Go to your nearest emergency department or:  · Call your local emergency services (421 in the [place]  · Call a suicide crisis helpline, such as the National Suicide Prevention Lifeline at 1-783.978.1179. This is open 24 hours a day in the [place]  · Text the Crisis Text Line at 010685 (in the [place]  Summary  · Generalized anxiety disorder (EMELYN) is a mental  health condition that involves worry that is not triggered by a specific event.  · People with EMELYN often worry excessively about many things in their lives, such as their health and family.  · EMELYN may cause symptoms such as restlessness, trouble concentrating, sleep problems, frequent sweating, nausea, diarrhea, headaches, and trembling or muscle twitching.  · A mental health specialist can help determine which treatment is best for you. Some people see improvement with one type of therapy. However, other people require a combination of therapies.  This information is not intended to replace advice given to you by your health care provider. Make sure you discuss any questions you have with your health care provider.  Document Revised: 10/07/2020 Document Reviewed: 10/07/2020  Elsevier Patient Education © 2021 [employer]

## 2021-11-01 DIAGNOSIS — F90.9 ATTENTION DEFICIT HYPERACTIVITY DISORDER (ADHD), UNSPECIFIED ADHD TYPE: ICD-10-CM

## 2021-11-01 RX ORDER — DEXTROAMPHETAMINE SACCHARATE, AMPHETAMINE ASPARTATE MONOHYDRATE, DEXTROAMPHETAMINE SULFATE AND AMPHETAMINE SULFATE 5; 5; 5; 5 MG/1; MG/1; MG/1; MG/1
20 CAPSULE, EXTENDED RELEASE ORAL EVERY MORNING
Qty: 30 CAPSULE | Refills: 0 | Status: SHIPPED | OUTPATIENT
Start: 2021-11-01 | End: 2022-02-10 | Stop reason: SDUPTHER

## 2021-11-01 RX ORDER — SERTRALINE HYDROCHLORIDE 25 MG/1
25 TABLET, FILM COATED ORAL DAILY
Qty: 90 TABLET | Refills: 0 | Status: SHIPPED | OUTPATIENT
Start: 2021-11-01 | End: 2022-03-04 | Stop reason: SDUPTHER

## 2021-12-30 DIAGNOSIS — G47.00 INSOMNIA, UNSPECIFIED TYPE: ICD-10-CM

## 2021-12-31 RX ORDER — TRAZODONE HYDROCHLORIDE 50 MG/1
TABLET ORAL
Qty: 30 TABLET | Refills: 0 | Status: SHIPPED | OUTPATIENT
Start: 2021-12-31 | End: 2022-02-02 | Stop reason: SDUPTHER

## 2022-02-02 ENCOUNTER — TELEPHONE (OUTPATIENT)
Dept: INTERNAL MEDICINE | Facility: CLINIC | Age: 57
End: 2022-02-02

## 2022-02-02 DIAGNOSIS — G47.00 INSOMNIA, UNSPECIFIED TYPE: ICD-10-CM

## 2022-02-02 RX ORDER — TRAZODONE HYDROCHLORIDE 50 MG/1
50 TABLET ORAL NIGHTLY
Qty: 30 TABLET | Refills: 1 | Status: SHIPPED | OUTPATIENT
Start: 2022-02-02 | End: 2022-04-04

## 2022-02-02 NOTE — TELEPHONE ENCOUNTER
----- Message from Josefa Nazario sent at 2/2/2022 12:34 PM EST -----  Regarding: Prescription question   I have a prescription called in to refill my Trazodone refilled, and it hasn’t been filled yet.

## 2022-02-08 ENCOUNTER — OFFICE VISIT (OUTPATIENT)
Dept: INTERNAL MEDICINE | Facility: CLINIC | Age: 57
End: 2022-02-08

## 2022-02-08 VITALS
BODY MASS INDEX: 31.71 KG/M2 | OXYGEN SATURATION: 97 % | HEART RATE: 82 BPM | SYSTOLIC BLOOD PRESSURE: 130 MMHG | DIASTOLIC BLOOD PRESSURE: 80 MMHG | HEIGHT: 63 IN | WEIGHT: 179 LBS | TEMPERATURE: 97.5 F

## 2022-02-08 DIAGNOSIS — F90.9 ATTENTION DEFICIT HYPERACTIVITY DISORDER (ADHD), UNSPECIFIED ADHD TYPE: ICD-10-CM

## 2022-02-08 DIAGNOSIS — E03.9 HYPOTHYROIDISM (ACQUIRED): Primary | ICD-10-CM

## 2022-02-08 PROCEDURE — 99213 OFFICE O/P EST LOW 20 MIN: CPT | Performed by: NURSE PRACTITIONER

## 2022-02-08 NOTE — ASSESSMENT & PLAN NOTE
Discussed with Dr. Thomas-- she will be covering for refills until patient is able to establish with behavioral health.

## 2022-02-08 NOTE — PROGRESS NOTES
"Chief Complaint  Hypothyroidism (Pt needs a med check.) and Med Refill    Subjective          Josefa Nazario presents to [facility]  History of Present Illness  This is a 57 y/o female presenting to office for f/u with hypothyroidism and requesting medication refill for adderall.    Patient reports she has been on the adderall for numerous years. Patient reports her current adderall medication helps with her staying on task and improves symptoms of ADD.     Patient continues taking synthroid of hypothyroidism. Patient is here for f/u with TSH/T4. Patient reports she is experiencing some cold hands/feet and some tiredness. She is concerned that her levels are off.       Objective   Vital Signs:   /80   Pulse 82   Temp 97.5 °F (36.4 °C) (Temporal)   Ht 158.8 cm (62.52\")   Wt 81.2 kg (179 lb)   SpO2 97%   BMI 32.20 kg/m²     Physical Exam  Vitals and nursing note reviewed.   Constitutional:       Appearance: Normal appearance. She is normal weight.   HENT:      Head: Normocephalic and atraumatic.   Eyes:      Extraocular Movements: Extraocular movements intact.      Conjunctiva/sclera: Conjunctivae normal.      Pupils: Pupils are equal, round, and reactive to light.   Cardiovascular:      Rate and Rhythm: Normal rate and regular rhythm.      Pulses: Normal pulses.      Heart sounds: Normal heart sounds. No murmur heard.  No friction rub. No gallop.    Pulmonary:      Effort: Pulmonary effort is normal. No respiratory distress.      Breath sounds: Normal breath sounds. No stridor. No wheezing, rhonchi or rales.   Abdominal:      General: There is no distension.      Palpations: Abdomen is soft.      Tenderness: There is no abdominal tenderness.   Musculoskeletal:         General: Normal range of motion.      Cervical back: Normal range of motion and neck supple.   Skin:     General: Skin is warm and dry.   Neurological:      General: No focal deficit present.      Mental " Status: She is alert and oriented to person, place, and time. Mental status is at baseline.      Motor: No weakness.   Psychiatric:         Mood and Affect: Mood normal.         Behavior: Behavior normal.         Thought Content: Thought content normal.         Judgment: Judgment normal.        Result Review :                 Assessment and Plan    Diagnoses and all orders for this visit:    1. Hypothyroidism (acquired) (Primary)  Assessment & Plan:  Labs today.       Orders:  -     TSH Rfx On Abnormal To Free T4    2. Attention deficit hyperactivity disorder (ADHD), unspecified ADHD type  Assessment & Plan:  Discussed with Dr. Thomas-- she will be covering for refills until patient is able to establish with behavioral health.       Orders:  -     Ambulatory Referral to Behavioral Health      Follow Up   Return in about 4 weeks (around 3/8/2022) for Recheck ADD.  Patient was given instructions and counseling regarding her condition or for health maintenance advice. Please see specific information pulled into the AVS if appropriate.

## 2022-02-09 LAB — TSH SERPL DL<=0.005 MIU/L-ACNC: 1.03 UIU/ML (ref 0.45–4.5)

## 2022-02-10 DIAGNOSIS — F90.9 ATTENTION DEFICIT HYPERACTIVITY DISORDER (ADHD), UNSPECIFIED ADHD TYPE: ICD-10-CM

## 2022-02-10 RX ORDER — DEXTROAMPHETAMINE SACCHARATE, AMPHETAMINE ASPARTATE MONOHYDRATE, DEXTROAMPHETAMINE SULFATE AND AMPHETAMINE SULFATE 5; 5; 5; 5 MG/1; MG/1; MG/1; MG/1
20 CAPSULE, EXTENDED RELEASE ORAL EVERY MORNING
Qty: 30 CAPSULE | Refills: 0 | Status: SHIPPED | OUTPATIENT
Start: 2022-02-10 | End: 2022-04-28 | Stop reason: SDUPTHER

## 2022-02-10 NOTE — TELEPHONE ENCOUNTER
Was a Dr Rodriguez pt   Last Contract 3/30/20-will need to update when she establishes next month with you  Last OV 2/8/22 fransisco/ Rosy   Next OV 3/8/22 to establish with you

## 2022-03-03 ENCOUNTER — TELEPHONE (OUTPATIENT)
Dept: INTERNAL MEDICINE | Facility: CLINIC | Age: 57
End: 2022-03-03

## 2022-03-03 NOTE — TELEPHONE ENCOUNTER
Caller: Josefa Nazario    Relationship: Self    Best call back number:[phone]     What is the best time to reach you: ANYTIME     Who are you requesting to speak with (clinical staff, provider,  specific staff member): UNKNOWN    Do you know the name of the person who called: UNKNOWN     What was the call regarding: PATIENT RETURNING A CALL TO SOMEONE IN REGARDS TO THE BEHAVIORAL HEALTH REFERRAL SHE WOULD LIKE TO CONTINUE WITH THE REFERRAL SHE STATED THAT [facility] WILL BE CALLING HER BACK TO SCHEDULE HER AN APPOINTMENT     Do you require a callback: YES

## 2022-03-04 ENCOUNTER — TELEPHONE (OUTPATIENT)
Dept: INTERNAL MEDICINE | Facility: CLINIC | Age: 57
End: 2022-03-04

## 2022-03-04 RX ORDER — SERTRALINE HYDROCHLORIDE 25 MG/1
25 TABLET, FILM COATED ORAL DAILY
Qty: 90 TABLET | Refills: 0 | Status: SHIPPED | OUTPATIENT
Start: 2022-03-04 | End: 2022-10-11 | Stop reason: SDUPTHER

## 2022-03-04 NOTE — TELEPHONE ENCOUNTER
----- Message from Josefa Nazario sent at 3/4/2022  9:49 AM EST -----  Regarding: Refill  I called in a refill on March 1st for my Zoloft, and it’s not been called back in to [facility] as of this morning.

## 2022-03-08 ENCOUNTER — OFFICE VISIT (OUTPATIENT)
Dept: INTERNAL MEDICINE | Facility: CLINIC | Age: 57
End: 2022-03-08

## 2022-03-08 VITALS
TEMPERATURE: 98.2 F | HEART RATE: 76 BPM | OXYGEN SATURATION: 99 % | HEIGHT: 62 IN | WEIGHT: 172 LBS | DIASTOLIC BLOOD PRESSURE: 62 MMHG | BODY MASS INDEX: 31.65 KG/M2 | SYSTOLIC BLOOD PRESSURE: 104 MMHG

## 2022-03-08 DIAGNOSIS — E03.9 HYPOTHYROIDISM (ACQUIRED): ICD-10-CM

## 2022-03-08 DIAGNOSIS — F90.9 ATTENTION DEFICIT HYPERACTIVITY DISORDER (ADHD), UNSPECIFIED ADHD TYPE: ICD-10-CM

## 2022-03-08 DIAGNOSIS — E78.5 HYPERLIPIDEMIA, UNSPECIFIED HYPERLIPIDEMIA TYPE: ICD-10-CM

## 2022-03-08 DIAGNOSIS — Z76.89 ENCOUNTER TO ESTABLISH CARE: Primary | ICD-10-CM

## 2022-03-08 PROCEDURE — 99214 OFFICE O/P EST MOD 30 MIN: CPT | Performed by: FAMILY MEDICINE

## 2022-03-08 NOTE — PROGRESS NOTES
"Chief Complaint  Establish Care and ADD    Subjective    History of Present Illness {CC  Problem List  Visit  Diagnosis   Encounters  Notes  Medications  Labs  Result Review Imaging  Media :23}     Josefa Nazario presents to [facility] for   History of Present Illness   55 yo female present to establish care. She is new to me, previously seeing Dr. Rodriguez.  She has a history of hypothyroidism, ADD, and anxiety.  Pt states she is taking medication for ADD for several years taking mostly during the weekday. Medication helps her to stay focus.    History of insomnia, trouble falling asleep. Taking trazodone daily.      7 hand surgery with arthritis.  Seeing ENT, told had TMJ and scheduled for surgery.       Social History     Socioeconomic History   • Marital status:      Spouse name:    • Number of children: 2   Tobacco Use   • Smoking status: Never Smoker   • Smokeless tobacco: Never Used   Vaping Use   • Vaping Use: Never used   Substance and Sexual Activity   • Alcohol use: Yes     Comment: social   • Drug use: No   • Sexual activity: Defer     Objective     Vital Signs:   /62   Pulse 76   Temp 98.2 °F (36.8 °C)   Ht 157.5 cm (62\")   Wt 78 kg (172 lb)   SpO2 99%   BMI 31.46 kg/m²   Physical Exam  Constitutional:       General: She is not in acute distress.     Appearance: She is not ill-appearing.   HENT:      Head: Normocephalic.      Mouth/Throat:      Mouth: Mucous membranes are moist.      Pharynx: Oropharynx is clear.   Eyes:      Conjunctiva/sclera: Conjunctivae normal.   Cardiovascular:      Rate and Rhythm: Regular rhythm.      Heart sounds: Normal heart sounds.   Pulmonary:      Effort: No respiratory distress.      Breath sounds: Normal breath sounds.   Musculoskeletal:      Right lower leg: No edema.      Left lower leg: No edema.   Neurological:      Mental Status: She is alert.   Psychiatric:         Mood and Affect: Mood normal.    "      Thought Content: Thought content normal.         Judgment: Judgment normal.        Result Review  Data Reviewed:{ Labs  Result Review  Imaging  Med Tab  Media :23}   The following data was reviewed by: Suzanne Thomas MD on 03/08/2022  Lab Results - Last 18 Months   Lab Units 02/08/22  0931 06/11/21  1527 11/19/20  1046   BUN mg/dL  --   --  19   CREATININE mg/dL  --   --  0.64   EGFR IF NONAFRICN AM mL/min/1.73  --   --  96   EGFR IF AFRICN AM mL/min/1.73  --   --  116   SODIUM mmol/L  --   --  139   POTASSIUM mmol/L  --   --  4.8   CHLORIDE mmol/L  --   --  103   CALCIUM mg/dL  --   --  9.6   ALBUMIN g/dL  --   --  4.40   BILIRUBIN mg/dL  --   --  0.4   ALK PHOS U/L  --   --  88   AST (SGOT) U/L  --   --  27   ALT (SGPT) U/L  --   --  30   CHOLESTEROL mg/dL  --   --  205*   TRIGLYCERIDES mg/dL  --   --  123   HDL CHOL mg/dL  --   --  60   VLDL CHOLESTEROL PAWAN mg/dL  --   --  22   LDL CHOL mg/dL  --   --  123*   WBC 10*3/mm3  --   --  6.11   RBC 10*6/mm3  --   --  4.36   HEMATOCRIT %  --   --  37.8   MCV fL  --   --  86.7   MCH pg  --   --  28.9   TSH uIU/mL 1.030 0.553 0.015*   FREE T4 ng/dL  --  1.71 1.91*   URIC ACID mg/dL  --   --  5.4            Current Outpatient Medications:   •  amphetamine-dextroamphetamine XR (Adderall XR) 20 MG 24 hr capsule, Take 1 capsule by mouth Every Morning, Disp: 30 capsule, Rfl: 0  •  EPINEPHrine (EPIPEN) 0.3 MG/0.3ML solution auto-injector injection, , Disp: , Rfl:   •  norethindrone-ethinyl estradiol (FEMHRT 1/5) 1-5 MG-MCG tablet, , Disp: , Rfl:   •  sertraline (Zoloft) 25 MG tablet, Take 1 tablet by mouth Daily., Disp: 90 tablet, Rfl: 0  •  Synthroid 137 MCG tablet, Take 1 tablet by mouth Daily., Disp: 90 tablet, Rfl: 2  •  traZODone (DESYREL) 50 MG tablet, Take 1 tablet by mouth Every Night., Disp: 30 tablet, Rfl: 1     Assessment and Plan {CC Problem List  Visit Diagnosis  ROS  Review (Popup)  Health Maintenance  Quality  BestPractice  Medications   SmartSets  SnapShot Encounters  Media :23}   Problem List Items Addressed This Visit        Cardiac and Vasculature    Hyperlipidemia    Relevant Orders    Lipid panel    Comprehensive metabolic panel       Endocrine and Metabolic    Hypothyroidism (acquired)    Current Assessment & Plan     Chronic  TSH stable last evaluation   Continue current medication.            Relevant Orders    Comprehensive metabolic panel       Mental Health    Attention deficit disorder    Current Assessment & Plan     Psychological condition is chronic, stable.  Continue current treatment regimen.  has appointment with  in a month.  Taking medication during the week.    Psychological condition  will be reassessed at the next regular appointment.             Other Visit Diagnoses     Encounter to establish care    -  Primary          Follow Up   Return in about 6 months (around 9/8/2022) for Annual physical.  Patient was given instructions and counseling regarding her condition or for health maintenance advice. Please see specific information pulled into the AVS if appropriate.    EpicAct:[email or web address]_ORDERS,RunParams:STARTUPTYPE=FOLLOW    [email or web address]_DISCHARGE

## 2022-03-08 NOTE — ASSESSMENT & PLAN NOTE
Psychological condition is chronic, stable.  Continue current treatment regimen.  has appointment with  in a month.  Taking medication during the week.    Psychological condition  will be reassessed at the next regular appointment.

## 2022-04-04 DIAGNOSIS — G47.00 INSOMNIA, UNSPECIFIED TYPE: ICD-10-CM

## 2022-04-04 RX ORDER — TRAZODONE HYDROCHLORIDE 50 MG/1
TABLET ORAL
Qty: 30 TABLET | Refills: 1 | Status: SHIPPED | OUTPATIENT
Start: 2022-04-04 | End: 2022-05-27

## 2022-04-28 ENCOUNTER — TELEMEDICINE (OUTPATIENT)
Dept: PSYCHIATRY | Facility: CLINIC | Age: 57
End: 2022-04-28

## 2022-04-28 DIAGNOSIS — F90.9 ATTENTION DEFICIT HYPERACTIVITY DISORDER (ADHD), UNSPECIFIED ADHD TYPE: Primary | ICD-10-CM

## 2022-04-28 DIAGNOSIS — F15.20 STIMULANT DEPENDENCE: ICD-10-CM

## 2022-04-28 DIAGNOSIS — F41.1 GENERALIZED ANXIETY DISORDER: ICD-10-CM

## 2022-04-28 DIAGNOSIS — F90.2 ATTENTION DEFICIT HYPERACTIVITY DISORDER, COMBINED TYPE: Primary | ICD-10-CM

## 2022-04-28 PROCEDURE — 90792 PSYCH DIAG EVAL W/MED SRVCS: CPT | Performed by: NURSE PRACTITIONER

## 2022-04-28 RX ORDER — DEXTROAMPHETAMINE SACCHARATE, AMPHETAMINE ASPARTATE MONOHYDRATE, DEXTROAMPHETAMINE SULFATE AND AMPHETAMINE SULFATE 5; 5; 5; 5 MG/1; MG/1; MG/1; MG/1
20 CAPSULE, EXTENDED RELEASE ORAL EVERY MORNING
Qty: 30 CAPSULE | Refills: 0 | Status: SHIPPED | OUTPATIENT
Start: 2022-04-28 | End: 2022-07-12 | Stop reason: SDUPTHER
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
## 2022-05-27 DIAGNOSIS — G47.00 INSOMNIA, UNSPECIFIED TYPE: ICD-10-CM

## 2022-05-27 RX ORDER — TRAZODONE HYDROCHLORIDE 50 MG/1
TABLET ORAL
Qty: 30 TABLET | Refills: 1 | Status: SHIPPED | OUTPATIENT
Start: 2022-05-27 | End: 2022-08-09

## 2022-06-27 DIAGNOSIS — F90.2 ATTENTION DEFICIT HYPERACTIVITY DISORDER, COMBINED TYPE: ICD-10-CM

## 2022-06-27 NOTE — TELEPHONE ENCOUNTER
I received a refill request for Adderall XR for this patient.  She was seen on 4/28/22 and has not completed her urine drug screen.  Please notify patient to obtain urine drug screen that was ordered, and as soon as urine drug screen is completed and reviewed, if appropriate results, refill will be sent in immediately.  Thank you.

## 2022-06-29 RX ORDER — DEXTROAMPHETAMINE SACCHARATE, AMPHETAMINE ASPARTATE MONOHYDRATE, DEXTROAMPHETAMINE SULFATE AND AMPHETAMINE SULFATE 5; 5; 5; 5 MG/1; MG/1; MG/1; MG/1
20 CAPSULE, EXTENDED RELEASE ORAL EVERY MORNING
Qty: 30 CAPSULE | Refills: 0 | OUTPATIENT
Start: 2022-06-29

## 2022-06-29 NOTE — TELEPHONE ENCOUNTER
Patient states she did not get her UDS because the order wasn't in the chart.  I explained to the patient that the order had been in her chart since 4/28. Patient said that she had called and left a message about it and no one had called her back. Patient states she is out of Adderall, but will be ok. Patient states she has had an injury to her hand and my be having hand surgery soon. Patient states her surgeon has prescribed her Tramadol.  Patient states she would like to wait to have her UDS until she has had her hand surgery.     Please Advise

## 2022-06-29 NOTE — TELEPHONE ENCOUNTER
I'm sorry but she will need to complete the UDS before medications have been sent in but tell her thank you for letting me know. I have sent you over some locations that she can have her labs done that will accept the order without issues.

## 2022-07-07 ENCOUNTER — TELEPHONE (OUTPATIENT)
Dept: INTERNAL MEDICINE | Facility: CLINIC | Age: 57
End: 2022-07-07

## 2022-07-07 DIAGNOSIS — F15.20 STIMULANT DEPENDENCE: ICD-10-CM

## 2022-07-07 DIAGNOSIS — F90.9 ATTENTION DEFICIT HYPERACTIVITY DISORDER (ADHD), UNSPECIFIED ADHD TYPE: Primary | ICD-10-CM

## 2022-07-07 DIAGNOSIS — F90.9 ATTENTION DEFICIT HYPERACTIVITY DISORDER (ADHD), UNSPECIFIED ADHD TYPE: ICD-10-CM

## 2022-07-07 NOTE — TELEPHONE ENCOUNTER
Hub staff attempted to follow warm transfer process and was unsuccessful     Caller: Josefa Nazario    Relationship to patient: Self    Best call back number: [phone]    Patient is needing:PATIENT NEEDS A URINE DRUG SCREENING.  ATTEMPTED TO TRANSFER BUT NO ANSWER.  PLEASE CALL TO SCHEDULE

## 2022-07-09 LAB
AMPHETAMINES UR QL SCN: NEGATIVE NG/ML
BARBITURATES UR QL SCN: NEGATIVE NG/ML
BENZODIAZ UR QL SCN: NEGATIVE NG/ML
BZE UR QL SCN: NEGATIVE NG/ML
CANNABINOIDS UR QL SCN: NEGATIVE NG/ML
CREAT UR-MCNC: 70.4 MG/DL (ref 20–300)
LABORATORY COMMENT REPORT: NORMAL
METHADONE UR QL SCN: NEGATIVE NG/ML
OPIATES UR QL SCN: NEGATIVE NG/ML
OXYCODONE+OXYMORPHONE UR QL SCN: NEGATIVE NG/ML
PCP UR QL: NEGATIVE NG/ML
PH UR: 5.8 [PH] (ref 4.5–8.9)
PROPOXYPH UR QL SCN: NEGATIVE NG/ML

## 2022-07-12 DIAGNOSIS — F90.2 ATTENTION DEFICIT HYPERACTIVITY DISORDER, COMBINED TYPE: ICD-10-CM

## 2022-07-12 RX ORDER — DEXTROAMPHETAMINE SACCHARATE, AMPHETAMINE ASPARTATE MONOHYDRATE, DEXTROAMPHETAMINE SULFATE AND AMPHETAMINE SULFATE 5; 5; 5; 5 MG/1; MG/1; MG/1; MG/1
20 CAPSULE, EXTENDED RELEASE ORAL EVERY MORNING
Qty: 30 CAPSULE | Refills: 0 | Status: SHIPPED | OUTPATIENT
Start: 2022-07-12 | End: 2022-08-18 | Stop reason: SDUPTHER

## 2022-08-06 DIAGNOSIS — G47.00 INSOMNIA, UNSPECIFIED TYPE: ICD-10-CM

## 2022-08-09 RX ORDER — TRAZODONE HYDROCHLORIDE 50 MG/1
TABLET ORAL
Qty: 30 TABLET | Refills: 1 | Status: SHIPPED | OUTPATIENT
Start: 2022-08-09 | End: 2022-10-11 | Stop reason: SDUPTHER

## 2022-08-12 RX ORDER — LEVOTHYROXINE SODIUM 137 MCG
137 TABLET ORAL DAILY
Qty: 90 TABLET | Refills: 2 | Status: SHIPPED | OUTPATIENT
Start: 2022-08-12

## 2022-08-18 ENCOUNTER — TELEMEDICINE (OUTPATIENT)
Dept: PSYCHIATRY | Facility: CLINIC | Age: 57
End: 2022-08-18

## 2022-08-18 DIAGNOSIS — F41.1 GENERALIZED ANXIETY DISORDER: ICD-10-CM

## 2022-08-18 DIAGNOSIS — F90.2 ATTENTION DEFICIT HYPERACTIVITY DISORDER, COMBINED TYPE: Primary | ICD-10-CM

## 2022-08-18 PROCEDURE — 99214 OFFICE O/P EST MOD 30 MIN: CPT | Performed by: NURSE PRACTITIONER

## 2022-08-18 RX ORDER — DEXTROAMPHETAMINE SACCHARATE, AMPHETAMINE ASPARTATE MONOHYDRATE, DEXTROAMPHETAMINE SULFATE AND AMPHETAMINE SULFATE 5; 5; 5; 5 MG/1; MG/1; MG/1; MG/1
20 CAPSULE, EXTENDED RELEASE ORAL EVERY MORNING
Qty: 30 CAPSULE | Refills: 0 | Status: SHIPPED | OUTPATIENT
Start: 2022-08-18 | End: 2022-10-05 | Stop reason: SDUPTHER

## 2022-08-18 NOTE — PROGRESS NOTES
"This provider is located at [facility], [address] [place] [address].The Patient is seen remotely at home, [address] [place] [address] via [employer]. Patient is being seen via telehealth and confirm that they are in a secure environment for this session. The patient's condition being diagnosed/treated is appropriate for telemedicine. The provider identified himself/herself: herself as well as her credentials.   The patient gave consent to be seen remotely, and when consent is given they understand that the consent allows for patient identifiable information to be sent to a third party as needed.   They may refuse to be seen remotely at any time. The electronic data is encrypted and password protected, and the patient has been advised of the potential risks to privacy not withstanding such measures.    You have chosen to receive care through a telehealth visit.  Do you consent to use a video/audio connection for your medical care today? Yes. Patient verified Name, , and address.       Chief Complaint  ADHD management     Subjective          Josefa Nazario presents to [facility] for medication management.     History of Present Illness  Patient presents today reporting that she is doing \"great\".  She states work is still the same as long as she takes her medicine she is able to focus and staying on task without any issues.  Patient denies any depressive symptoms or hopeless or helplessness.  She reports that her sleep does vary and uses the trazodone to help with her sleep.  Reports her appetite is normal.  Patient states that she did taper herself off of the sertraline since she was doing so well and has been off of it for 2 weeks without any concerns or major anxiety symptoms.  Patient states that she did have a sinus surgery and has recovered.  Denies any new medical or medicine changes.  Denies any SI/HI/AH/VH.      Without the " medication patient has problems with attention and focus including easily distracted, easily losing objects, trouble with time management, trouble completing tasks because of distractions, procrastination, indecisiveness, spends lots of extra time compensating for trouble with inattention/focus by making multiple to-do lists, careless mistakes at work, not finishing jobs that are started.  Patient denies any side effects, no worsening of insomnia, no worsening of anxiety on the medication dose.       Objective   Vital Signs:   There were no vitals taken for this visit.  Due to the remote nature of this encounter (virtual encounter), vitals were unable to be obtained.  Height stated at 62 inches.  Weight stated at 172 pounds.      PHQ-9 Score:   PHQ-9 Total Score:       Patient screened  for depression based on a PHQ-9 score of  on . Follow-up recommendations include: see notes and medication list.    PHQ-9 Depression Screening  Little interest or pleasure in doing things?     Feeling down, depressed, or hopeless?     Trouble falling or staying asleep, or sleeping too much?     Feeling tired or having little energy?     Poor appetite or overeating?     Feeling bad about yourself - or that you are a failure or have let yourself or your family down?     Trouble concentrating on things, such as reading the newspaper or watching television?     Moving or speaking so slowly that other people could have noticed? Or the opposite - being so fidgety or restless that you have been moving around a lot more than usual?     Thoughts that you would be better off dead, or of hurting yourself in some way?     PHQ-9 Total Score     If you checked off any problems, how difficult have these problems made it for you to do your work, take care of things at home, or get along with other people?       PHQ-9 Total Score:               PROMIS scale screening tool that patient filled out virtually reviewed by this APRN at today's  encounter.      Mental Status Exam:   Hygiene:   good  Cooperation:  Cooperative  Eye Contact:  Good  Psychomotor Behavior:  Appropriate  Affect:  Appropriate  Mood: normal  Speech:  Normal  Thought Process:  Goal directed  Thought Content:  Normal  Suicidal:  None  Homicidal:  None  Hallucinations:  None  Delusion:  None  Memory:  Intact  Orientation:  Person, Place, Time and Situation  Reliability:  good  Insight:  Good  Judgement:  Good and Fair  Impulse Control:  Good  Physical/Medical Issues:  Yes Hypothyroidism     Current Medications:   Current Outpatient Medications   Medication Sig Dispense Refill   • amphetamine-dextroamphetamine XR (Adderall XR) 20 MG 24 hr capsule Take 1 capsule by mouth Every Morning 30 capsule 0   • norethindrone-ethinyl estradiol (FEMHRT 1/5) 1-5 MG-MCG tablet      • sertraline (Zoloft) 25 MG tablet Take 1 tablet by mouth Daily. 90 tablet 0   • Synthroid 137 MCG tablet Take 1 tablet by mouth Daily. 90 tablet 2   • traZODone (DESYREL) 50 MG tablet TAKE ONE TABLET BY MOUTH ONCE NIGHTLY 30 tablet 1     No current facility-administered medications for this visit.       Physical Exam  Nursing note reviewed.   Constitutional:       Appearance: Normal appearance.   Neurological:      Mental Status: She is alert.   Psychiatric:         Attention and Perception: Attention and perception normal.         Mood and Affect: Mood and affect normal.         Speech: Speech normal.         Behavior: Behavior is cooperative.         Thought Content: Thought content normal.         Cognition and Memory: Cognition and memory normal.         Judgment: Judgment normal.        Result Review :     The following data was reviewed by: EVELINA Peace on 08/18/2022:  Common labs    Common Labsle 3/25/22 3/25/22    0910 0910   Glucose  97   BUN  11   Creatinine  0.82   Sodium  137   Potassium  4.5   Chloride  101   Calcium  9.3   Total Protein  7.1   Albumin  4.3   Total Bilirubin  0.5   Alkaline  "Phosphatase  70   AST (SGOT)  23   ALT (SGPT)  22   Total Cholesterol 234 (A)    Triglycerides 104    HDL Cholesterol 58    LDL Cholesterol  158 (A)    (A) Abnormal value            Data reviewed: PCP notes        Assessment and Plan    Problem List Items Addressed This Visit    None     Visit Diagnoses     Attention deficit hyperactivity disorder, combined type    -  Primary    Relevant Medications    amphetamine-dextroamphetamine XR (Adderall XR) 20 MG 24 hr capsule    Generalized anxiety disorder        Relevant Medications    amphetamine-dextroamphetamine XR (Adderall XR) 20 MG 24 hr capsule            TREATMENT PLAN/GOALS: Continue supportive psychotherapy efforts and medications as indicated. Treatment and medication options discussed during today's visit. Patient ackowledged and verbally consented to continue with current treatment plan and was educated on the importance of compliance with treatment and follow-up appointments.    MEDICATION ISSUES:  We discussed risks, benefits, and side effects of the above medications and the patient was agreeable with the plan. Patient was educated on the importance of compliance with treatment and follow-up appointments.  Patient is agreeable to call the office with any worsening of symptoms or onset of side effects. Patient is agreeable to call 911 or go to the nearest ER should he/she begin having SI/HI.      Controlled Substance Medication Contract    Controlled substance medications (i.e. benzodiazepines, opioids, amphetamines) are very useful, but have a high potential for misuse and are, therefore, closely controlled by local, state, and federal government(s). As a patient of [facility], you agree and understand the followin. I am responsible for the controlled substance medications prescribed to me. If my prescription is misplaced, stolen, or if \"I run out early,\" I understand this medication will not be replaced regardless of " "the circumstances.  2. Refills of controlled substance medications: (a) Will be made only during regular office hours Monday-Thursday once a month and during a scheduled virtual appointment. Refills will not be made at night, weekends, or on holidays. (b) Will not be made if \"I lost my prescriptions,\" \"ran out early,\" or \"misplaced my medication\". I am solely responsible for taking the medication as prescribed and for keeping track of the remaining.   3. I agree to comply with urine drug testing and pill counts at the provider's discretion, thereby, documenting the proper use of any medications. If alcohol abuse is suspected, a breathalyzer or blood alcohol level may be ordered. Unannounced urine or serum toxicology specimens may be requested and my cooperation is required.  4. I understand that if I violate any of the above conditions, my prescriptions for controlled medications my be terminated. If the violation involves obtaining these medications from another individual, or the concomitant use of non-prescription illicit (illegal) drugs, I may also be reported to other providers, pharmacies, medical facilities and the appropriate authorities.   5. I further understand that if I violate this controlled substance contract due to non-compliance of medical directions, such as the failure in taking medications as prescribed, utilizing other illicit drugs, or abuse of controlled medications, the prescription for controlled medications may be terminated.   6. I agree to keep my scheduled appointments and conduct myself in a courteous manner.  7. I agree not to sell, share, or give any medication to another person. I understand that such mishandling of my medication is a serious violation of this agreement and would result in my treatment being terminated without any recourse for appeal.   8. I agree not to take my medication from any physicians, nurse practitioners, pharmacies or other sources without telling my " prescriber.  9. I agree to take my medication as my prescriber has instructed and not to alter the way I take my medication without first consulting my prescriber.  10. I agree to abstain from problematic alcohol usage, opioids, marijuana, cocaine, and other addictive substances.   11. If I am legally involved related to legal or illegal drugs, including alcohol, refill of controlled substances will not be given until a re-evaluation of my chemical dependency treatment plan has been completed. Refills are at the discretion of the prescriber.   12. I agree to fill all of my controlled medications at an in-state ([place]) pharmacy.   13. I understand that [facility] utilizes the [place] All Schedule Prescription Electronic Reporting ([employer]) system and will monitor my prescription history via this source.  14. Benzodiazepines are drugs prescribed to treat conditions like anxiety, insomnia, and seizures. Examples of these drugs include: alprazolam, clonazepam, diazepam, and lorazepam. The FDA has applied a Black Box Warning (one of the strictest warnings) that the use of opioids and benzodiazepines together have serious risks to include unusual dizziness or lightheadedness, extreme sleepiness, slowed or difficult breathing, coma and death. There is an added risk if alcohol is also ingested. It is the policy of [facility] to NOT prescribe benzodiazepines to patients who also use opioids. If a patient already presents already prescribed both, the prescriber my direct the patient to their previous provider who prescribed it or taper the benzodiazepine as part of the treatment plan. These patients must be monitored at appropriate intervals and so visits may be more frequent.     This APRN has discussed and reviewed this information with the patient and/or guardian. The patient and/or guardian verbally agreed (no signatures are obtained during today's visit as  they are a telehealth patient and is unable to print and sign this document, therefore, verbal agreement has been obtained).     -Continue Adderall XR 20 daily for ADHD symptoms.  -Patient stated that she was doing well and only on 25 mg of sertraline so was tapered off for the past 2 weeks and doing well.  Highly encouraged patient if she did have any worsening anxiety symptoms or concerns to contact the clinic as we can go back on the sertraline patient verbalized understanding.      Counseled patient regarding multimodal approach with healthy nutrition, healthy sleep, regular physical activity, social activities, counseling, and medications.      Coping skills reviewed and encouraged positive framing of thoughts     Assisted patient in processing above session content; acknowledged and normalized patient’s thoughts, feelings, and concerns.  Applied  positive coping skills and behavior management in session.  Allowed patient to freely discuss issues without interruption or judgment. Provided safe, confidential environment to facilitate the development of positive therapeutic relationship and encourage open, honest communication. Assisted patient in identifying risk factors which would indicate the need for higher level of care including thoughts to harm self or others and/or self-harming behavior and encouraged patient to contact this office, call 911, or present to the nearest emergency room should any of these events occur. Discussed crisis intervention services and means to access.     MEDS ORDERED DURING VISIT:  New Medications Ordered This Visit   Medications   • amphetamine-dextroamphetamine XR (Adderall XR) 20 MG 24 hr capsule     Sig: Take 1 capsule by mouth Every Morning     Dispense:  30 capsule     Refill:  0           Follow Up   Return in about 3 months (around 11/18/2022), or if symptoms worsen or fail to improve, for Recheck.  Encouraged patient to contact the clinic if sooner appointment needed or  concerns she verbalized understanding.    Patient was given instructions and counseling regarding her condition or for health maintenance advice. Please see specific information pulled into the AVS if appropriate.     Some of the data in this electronic note has been brought forward from a previous encounter, any necessary changes have been made, it has been reviewed by this APRN, and it is accurate.        This document has been electronically signed by EVELINA Peace  August 18, 2022 11:39 EDT    Part of this note may be an electronic transcription/translation of spoken language to printed text using the Dragon Dictation System.

## 2022-10-05 DIAGNOSIS — G47.00 INSOMNIA, UNSPECIFIED TYPE: ICD-10-CM

## 2022-10-05 DIAGNOSIS — F90.2 ATTENTION DEFICIT HYPERACTIVITY DISORDER, COMBINED TYPE: ICD-10-CM

## 2022-10-05 RX ORDER — DEXTROAMPHETAMINE SACCHARATE, AMPHETAMINE ASPARTATE MONOHYDRATE, DEXTROAMPHETAMINE SULFATE AND AMPHETAMINE SULFATE 5; 5; 5; 5 MG/1; MG/1; MG/1; MG/1
20 CAPSULE, EXTENDED RELEASE ORAL EVERY MORNING
Qty: 30 CAPSULE | Refills: 0 | Status: SHIPPED | OUTPATIENT
Start: 2022-10-05 | End: 2022-11-16 | Stop reason: SDUPTHER

## 2022-10-06 RX ORDER — SERTRALINE HYDROCHLORIDE 25 MG/1
TABLET, FILM COATED ORAL
Qty: 30 TABLET | OUTPATIENT
Start: 2022-10-06

## 2022-10-06 RX ORDER — TRAZODONE HYDROCHLORIDE 50 MG/1
TABLET ORAL
Qty: 30 TABLET | Refills: 1 | OUTPATIENT
Start: 2022-10-06

## 2022-10-11 ENCOUNTER — OFFICE VISIT (OUTPATIENT)
Dept: INTERNAL MEDICINE | Facility: CLINIC | Age: 57
End: 2022-10-11

## 2022-10-11 VITALS
TEMPERATURE: 98.2 F | BODY MASS INDEX: 31.64 KG/M2 | WEIGHT: 173 LBS | OXYGEN SATURATION: 97 % | SYSTOLIC BLOOD PRESSURE: 116 MMHG | DIASTOLIC BLOOD PRESSURE: 88 MMHG | HEART RATE: 103 BPM

## 2022-10-11 DIAGNOSIS — E78.5 HYPERLIPIDEMIA, UNSPECIFIED HYPERLIPIDEMIA TYPE: Primary | ICD-10-CM

## 2022-10-11 DIAGNOSIS — E03.9 HYPOTHYROIDISM (ACQUIRED): ICD-10-CM

## 2022-10-11 DIAGNOSIS — G47.00 INSOMNIA, UNSPECIFIED TYPE: ICD-10-CM

## 2022-10-11 DIAGNOSIS — Z12.11 SCREEN FOR COLON CANCER: ICD-10-CM

## 2022-10-11 DIAGNOSIS — F41.9 ANXIETY: ICD-10-CM

## 2022-10-11 LAB
ALBUMIN SERPL-MCNC: 4.5 G/DL (ref 3.5–5.2)
ALBUMIN/GLOB SERPL: 1.9 G/DL
ALP SERPL-CCNC: 66 U/L (ref 39–117)
ALT SERPL-CCNC: 18 U/L (ref 1–33)
AST SERPL-CCNC: 21 U/L (ref 1–32)
BILIRUB SERPL-MCNC: 0.3 MG/DL (ref 0–1.2)
BUN SERPL-MCNC: 11 MG/DL (ref 6–20)
BUN/CREAT SERPL: 17.2 (ref 7–25)
CALCIUM SERPL-MCNC: 9.1 MG/DL (ref 8.6–10.5)
CHLORIDE SERPL-SCNC: 104 MMOL/L (ref 98–107)
CHOLEST SERPL-MCNC: 241 MG/DL (ref 0–200)
CO2 SERPL-SCNC: 23 MMOL/L (ref 22–29)
CREAT SERPL-MCNC: 0.64 MG/DL (ref 0.57–1)
EGFRCR SERPLBLD CKD-EPI 2021: 102.6 ML/MIN/1.73
GLOBULIN SER CALC-MCNC: 2.4 GM/DL
GLUCOSE SERPL-MCNC: 98 MG/DL (ref 65–99)
HDLC SERPL-MCNC: 51 MG/DL (ref 40–60)
LDLC SERPL CALC-MCNC: 165 MG/DL (ref 0–100)
POTASSIUM SERPL-SCNC: 4.4 MMOL/L (ref 3.5–5.2)
PROT SERPL-MCNC: 6.9 G/DL (ref 6–8.5)
SODIUM SERPL-SCNC: 138 MMOL/L (ref 136–145)
TRIGL SERPL-MCNC: 137 MG/DL (ref 0–150)
TSH SERPL DL<=0.005 MIU/L-ACNC: 1.37 UIU/ML (ref 0.27–4.2)
VLDLC SERPL CALC-MCNC: 25 MG/DL (ref 5–40)

## 2022-10-11 PROCEDURE — 99214 OFFICE O/P EST MOD 30 MIN: CPT | Performed by: FAMILY MEDICINE

## 2022-10-11 RX ORDER — TRAZODONE HYDROCHLORIDE 50 MG/1
50 TABLET ORAL NIGHTLY
Qty: 30 TABLET | Refills: 3 | Status: SHIPPED | OUTPATIENT
Start: 2022-10-11 | End: 2023-02-07

## 2022-10-11 NOTE — PROGRESS NOTES
Chief Complaint  Hyperlipidemia and Anxiety    Subjective    History of Present Illness {CC  Problem List  Visit  Diagnosis   Encounters  Notes  Medications  Labs  Result Review Imaging  Media :23}     Josefa Nazario presents to [facility] for   History of Present Illness   56 yo female present for follow up viist. States she still has some panic attacks not as often as they use to has improved some with sertraline 25 mg.  She states needs increase in dose of medication.   She is sleeping well with trazodone.       Social History     Socioeconomic History   • Marital status:      Spouse name:    • Number of children: 3   • Highest education level: Bachelor's degree (e.g., BA, AB, BS)   Tobacco Use   • Smoking status: Never   • Smokeless tobacco: Never   Vaping Use   • Vaping Use: Never used   Substance and Sexual Activity   • Alcohol use: Yes     Comment: social   • Drug use: No   • Sexual activity: Defer     Birth control/protection: Post-menopausal      Objective     Vital Signs:   /88 (BP Location: Left arm, Patient Position: Sitting, Cuff Size: Adult)   Pulse 103   Temp 98.2 °F (36.8 °C) (Tympanic)   Wt 78.5 kg (173 lb)   SpO2 97%   BMI 31.64 kg/m²   Physical Exam  Constitutional:       General: She is not in acute distress.     Appearance: She is not ill-appearing.   HENT:      Head: Normocephalic.   Cardiovascular:      Rate and Rhythm: Regular rhythm.      Heart sounds: Normal heart sounds.   Pulmonary:      Effort: No respiratory distress.      Breath sounds: Normal breath sounds. No wheezing.   Musculoskeletal:      Right lower leg: No edema.      Left lower leg: No edema.   Neurological:      Mental Status: She is alert.        Result Review  Data Reviewed:{ Labs  Result Review  Imaging  Med Tab  Media :23}   The following data was reviewed by: Suzanne Thomas MD on 10/11/2022  Lab Results - Last 18 Months   Lab Units  03/25/22  0910 02/08/22  0931 06/11/21  1527   BUN mg/dL 11  --   --    CREATININE mg/dL 0.82  --   --    SODIUM mmol/L 137  --   --    POTASSIUM mmol/L 4.5  --   --    CHLORIDE mmol/L 101  --   --    CALCIUM mg/dL 9.3  --   --    ALBUMIN g/dL 4.3  --   --    BILIRUBIN mg/dL 0.5  --   --    ALK PHOS IU/L 70  --   --    AST (SGOT) IU/L 23  --   --    ALT (SGPT) IU/L 22  --   --    CHOLESTEROL mg/dL 234*  --   --    TRIGLYCERIDES mg/dL 104  --   --    HDL CHOL mg/dL 58  --   --    VLDL CHOLESTEROL PAWAN mg/dL 18  --   --    LDL CHOL mg/dL 158*  --   --    TSH uIU/mL  --  1.030 0.553   FREE T4 ng/dL  --   --  1.71              Current Outpatient Medications:   •  amphetamine-dextroamphetamine XR (Adderall XR) 20 MG 24 hr capsule, Take 1 capsule by mouth Every Morning, Disp: 30 capsule, Rfl: 0  •  norethindrone-ethinyl estradiol (FEMHRT 1/5) 1-5 MG-MCG tablet, , Disp: , Rfl:   •  sertraline (Zoloft) 50 MG tablet, Take 1 tablet by mouth Daily., Disp: 90 tablet, Rfl: 3  •  traZODone (DESYREL) 50 MG tablet, Take 1 tablet by mouth Every Night., Disp: 30 tablet, Rfl: 3  •  Synthroid 137 MCG tablet, Take 1 tablet by mouth Daily., Disp: 90 tablet, Rfl: 2      Assessment and Plan {CC Problem List  Visit Diagnosis  ROS  Review (Popup)  Health Maintenance  Quality  BestPractice  Medications  SmartSets  SnapShot Encounters  Media :23}   Problem List Items Addressed This Visit        Cardiac and Vasculature    Hyperlipidemia - Primary    Current Assessment & Plan     Lipid abnormalities are chronic, LDL elevated above goal  continue monitor lipid panel, low fat high ifber diet   Not taking any medication, managing with diet  Check today   Lipids will be reassessed in 6 months.         Relevant Orders    Lipid panel (Completed)    Comprehensive metabolic panel (Completed)       Endocrine and Metabolic    Hypothyroidism (acquired)    Current Assessment & Plan     Chronic  Continue to monitor TSH  Continue current medication  adjust medication as indicated based upon results         Relevant Orders    TSH Rfx On Abnormal To Free T4 (Completed)       Mental Health    Anxiety    Current Assessment & Plan     Chronic, improving  Increase sertraline to 50 mg  Continue to monitor            Sleep    Insomnia    Current Assessment & Plan     Chronic  Stable on trazodone   Continue to monitor   Refilled  Continue current medication          Relevant Medications    traZODone (DESYREL) 50 MG tablet   Other Visit Diagnoses     Screen for colon cancer        Relevant Orders    Ambulatory Referral For Screening Colonoscopy          Follow Up   Return in about 5 months (around 3/11/2023) for Annual physical.  Patient was given instructions and counseling regarding her condition or for health maintenance advice. Please see specific information pulled into the AVS if appropriate.    EpicAct:MR_[email or web address]_ORDERS,RunParams:STARTUPTYPE=FOLLOW    MR_[email or web address]_DISCHARGE  Answers for HPI/ROS submitted by the patient on 10/11/2022  Please describe your symptoms.: Prescription refill , Thyroid and Cholesterol check  Have you had these symptoms before?: No  How long have you been having these symptoms?: 1-4 days  What is the primary reason for your visit?: Other

## 2022-10-12 PROBLEM — G47.00 INSOMNIA: Status: ACTIVE | Noted: 2022-10-12

## 2022-10-12 NOTE — ASSESSMENT & PLAN NOTE
Lipid abnormalities are chronic, LDL elevated above goal  continue monitor lipid panel, low fat high ifber diet   Not taking any medication, managing with diet  Check today   Lipids will be reassessed in 6 months.

## 2022-10-12 NOTE — ASSESSMENT & PLAN NOTE
Chronic  Continue to monitor TSH  Continue current medication adjust medication as indicated based upon results

## 2022-11-12 NOTE — TELEPHONE ENCOUNTER
Caller: Josefa Nazario    Relationship: Self    Best call back number: [phone]     Medication needed:   Requested Prescriptions     Pending Prescriptions Disp Refills   • amphetamine-dextroamphetamine XR (Adderall XR) 20 MG 24 hr capsule 30 capsule 0     Sig: Take 1 capsule by mouth Every Morning       When do you need the refill by: 09/19/2020      What details did the patient provide when requesting the medication: HAS 3 DAYS LEFT    Does the patient have less than a 3 day supply:  [x] Yes  [] No    What is the patient's preferred pharmacy:      [facility] [facility] AT [address] [phone] [facility] [phone]          
She needs to be rescheduled for follow up.  Appointment last week had to be cancelled    Please advise refill   Last OV 05/14/2020  Reggie in process   
132

## 2022-11-16 ENCOUNTER — TELEMEDICINE (OUTPATIENT)
Dept: PSYCHIATRY | Facility: CLINIC | Age: 57
End: 2022-11-16

## 2022-11-16 DIAGNOSIS — F41.1 GENERALIZED ANXIETY DISORDER: ICD-10-CM

## 2022-11-16 DIAGNOSIS — F90.2 ATTENTION DEFICIT HYPERACTIVITY DISORDER, COMBINED TYPE: Primary | ICD-10-CM

## 2022-11-16 PROCEDURE — 99214 OFFICE O/P EST MOD 30 MIN: CPT | Performed by: NURSE PRACTITIONER

## 2022-11-16 RX ORDER — DEXTROAMPHETAMINE SACCHARATE, AMPHETAMINE ASPARTATE MONOHYDRATE, DEXTROAMPHETAMINE SULFATE AND AMPHETAMINE SULFATE 5; 5; 5; 5 MG/1; MG/1; MG/1; MG/1
20 CAPSULE, EXTENDED RELEASE ORAL EVERY MORNING
Qty: 30 CAPSULE | Refills: 0 | Status: SHIPPED | OUTPATIENT
Start: 2022-11-16 | End: 2023-01-09 | Stop reason: SDUPTHER

## 2022-11-16 NOTE — PROGRESS NOTES
This provider is located at [facility], [address] [place] [address].The Patient is seen remotely at home, [address] [place] [address] via [employer]. Patient is being seen via telehealth and confirm that they are in a secure environment for this session. The patient's condition being diagnosed/treated is appropriate for telemedicine. The provider identified himself/herself: herself as well as her credentials.   The patient gave consent to be seen remotely, and when consent is given they understand that the consent allows for patient identifiable information to be sent to a third party as needed.   They may refuse to be seen remotely at any time. The electronic data is encrypted and password protected, and the patient has been advised of the potential risks to privacy not withstanding such measures.    You have chosen to receive care through a telehealth visit.  Do you consent to use a video/audio connection for your medical care today? Yes. Patient verified Name, , and address.       Chief Complaint  ADHD management     Subjective    Josefa Nazario presents to [facility] for medication management.     History of Present Illness   Patient presents today reporting that her focus and attention at work has been good.  She denies having any depressive symptoms.  Noted that she had elevated heart rate at her last PCP visit, however the patient denies any chest pain or palpitations as well as she monitors her heart rate with her watch and it is always normal.  She does endorse that she had some anxiety symptoms but she stated she felt like she was about to pass out but this does not happen often and happens at night before bed maybe 5 or less times in the past 3 months.  She says that her sleep and appetite have been good.  She denies any side effects to current medications. Denies SI/HI/AH/VH.      Without the medication patient has  problems with attention and focus including easily distracted, easily losing objects, trouble with time management, trouble completing tasks because of distractions, procrastination, indecisiveness, spends lots of extra time compensating for trouble with inattention/focus by making multiple to-do lists, careless mistakes at work, not finishing jobs that are started.  Patient denies any side effects, no worsening of insomnia, no worsening of anxiety on the medication dose.       Objective   Vital Signs:   There were no vitals taken for this visit.  Due to the remote nature of this encounter (virtual encounter), vitals were unable to be obtained.  Height stated at 62 inches.  Weight stated at 173 pounds.      PHQ-9 Score:   PHQ-9 Total Score:       Patient screened  for depression based on a PHQ-9 score of  on . Follow-up recommendations include: see notes and medication list.    PHQ-9 Depression Screening  Little interest or pleasure in doing things?     Feeling down, depressed, or hopeless?     Trouble falling or staying asleep, or sleeping too much?     Feeling tired or having little energy?     Poor appetite or overeating?     Feeling bad about yourself - or that you are a failure or have let yourself or your family down?     Trouble concentrating on things, such as reading the newspaper or watching television?     Moving or speaking so slowly that other people could have noticed? Or the opposite - being so fidgety or restless that you have been moving around a lot more than usual?     Thoughts that you would be better off dead, or of hurting yourself in some way?     PHQ-9 Total Score     If you checked off any problems, how difficult have these problems made it for you to do your work, take care of things at home, or get along with other people?       PHQ-9 Total Score:               PROMIS scale screening tool that patient filled out virtually reviewed by this APRN at today's encounter.      Mental Status Exam:    Hygiene:   good  Cooperation:  Cooperative  Eye Contact:  Good  Psychomotor Behavior:  Appropriate  Affect:  Appropriate  Mood: normal  Speech:  Normal  Thought Process:  Goal directed  Thought Content:  Normal  Suicidal:  None  Homicidal:  None  Hallucinations:  None  Delusion:  None  Memory:  Intact  Orientation:  Person, Place, Time and Situation  Reliability:  good  Insight:  Good  Judgement:  Good and Fair  Impulse Control:  Good  Physical/Medical Issues:  Yes Hypothyroidism     Current Medications:   Current Outpatient Medications   Medication Sig Dispense Refill   • amphetamine-dextroamphetamine XR (Adderall XR) 20 MG 24 hr capsule Take 1 capsule by mouth Every Morning 30 capsule 0   • norethindrone-ethinyl estradiol (FEMHRT 1/5) 1-5 MG-MCG tablet      • sertraline (Zoloft) 50 MG tablet Take 1 tablet by mouth Daily. 90 tablet 3   • Synthroid 137 MCG tablet Take 1 tablet by mouth Daily. 90 tablet 2   • traZODone (DESYREL) 50 MG tablet Take 1 tablet by mouth Every Night. 30 tablet 3     No current facility-administered medications for this visit.       Physical Exam  Nursing note reviewed.   Constitutional:       Appearance: Normal appearance.   Neurological:      Mental Status: She is alert.   Psychiatric:         Attention and Perception: Attention and perception normal.         Mood and Affect: Mood and affect normal.         Speech: Speech normal.         Behavior: Behavior is cooperative.         Thought Content: Thought content normal.         Cognition and Memory: Cognition and memory normal.         Judgment: Judgment normal.        Result Review :     The following data was reviewed by: EVELINA Peace on 08/18/2022:  Common labs    Common Labs 3/25/22 3/25/22 10/11/22 10/11/22    0910 0910 0935 0935   Glucose  97  98   BUN  11  11   Creatinine  0.82  0.64   Sodium  137  138   Potassium  4.5  4.4   Chloride  101  104   Calcium  9.3  9.1   Total Protein  7.1  6.9   Albumin  4.3  4.50   Total  Bilirubin  0.5  0.3   Alkaline Phosphatase  70  66   AST (SGOT)  23  21   ALT (SGPT)  22  18   Total Cholesterol 234 (A)  241 (A)    Triglycerides 104  137    HDL Cholesterol 58  51    LDL Cholesterol  158 (A)  165 (A)    (A) Abnormal value       Comments are available for some flowsheets but are not being displayed.           Data reviewed: PCP notes        Assessment and Plan    Problem List Items Addressed This Visit    None  Visit Diagnoses     Attention deficit hyperactivity disorder, combined type    -  Primary    Relevant Medications    amphetamine-dextroamphetamine XR (Adderall XR) 20 MG 24 hr capsule    Other Relevant Orders    ECG 12 Lead    Generalized anxiety disorder        Relevant Medications    amphetamine-dextroamphetamine XR (Adderall XR) 20 MG 24 hr capsule            TREATMENT PLAN/GOALS: Continue supportive psychotherapy efforts and medications as indicated. Treatment and medication options discussed during today's visit. Patient ackowledged and verbally consented to continue with current treatment plan and was educated on the importance of compliance with treatment and follow-up appointments.    MEDICATION ISSUES:  We discussed risks, benefits, and side effects of the above medications and the patient was agreeable with the plan. Patient was educated on the importance of compliance with treatment and follow-up appointments.  Patient is agreeable to call the office with any worsening of symptoms or onset of side effects. Patient is agreeable to call 911 or go to the nearest ER should he/she begin having SI/HI.      Controlled Substance Medication Contract    Controlled substance medications (i.e. benzodiazepines, opioids, amphetamines) are very useful, but have a high potential for misuse and are, therefore, closely controlled by local, state, and federal government(s). As a patient of [facility], you agree and understand the followin. I am responsible for  "the controlled substance medications prescribed to me. If my prescription is misplaced, stolen, or if \"I run out early,\" I understand this medication will not be replaced regardless of the circumstances.  2. Refills of controlled substance medications: (a) Will be made only during regular office hours Monday-Thursday once a month and during a scheduled virtual appointment. Refills will not be made at night, weekends, or on holidays. (b) Will not be made if \"I lost my prescriptions,\" \"ran out early,\" or \"misplaced my medication\". I am solely responsible for taking the medication as prescribed and for keeping track of the remaining.   3. I agree to comply with urine drug testing and pill counts at the provider's discretion, thereby, documenting the proper use of any medications. If alcohol abuse is suspected, a breathalyzer or blood alcohol level may be ordered. Unannounced urine or serum toxicology specimens may be requested and my cooperation is required.  4. I understand that if I violate any of the above conditions, my prescriptions for controlled medications my be terminated. If the violation involves obtaining these medications from another individual, or the concomitant use of non-prescription illicit (illegal) drugs, I may also be reported to other providers, pharmacies, medical facilities and the appropriate authorities.   5. I further understand that if I violate this controlled substance contract due to non-compliance of medical directions, such as the failure in taking medications as prescribed, utilizing other illicit drugs, or abuse of controlled medications, the prescription for controlled medications may be terminated.   6. I agree to keep my scheduled appointments and conduct myself in a courteous manner.  7. I agree not to sell, share, or give any medication to another person. I understand that such mishandling of my medication is a serious violation of this agreement and would result in my treatment " being terminated without any recourse for appeal.   8. I agree not to take my medication from any physicians, nurse practitioners, pharmacies or other sources without telling my prescriber.  9. I agree to take my medication as my prescriber has instructed and not to alter the way I take my medication without first consulting my prescriber.  10. I agree to abstain from problematic alcohol usage, opioids, marijuana, cocaine, and other addictive substances.   11. If I am legally involved related to legal or illegal drugs, including alcohol, refill of controlled substances will not be given until a re-evaluation of my chemical dependency treatment plan has been completed. Refills are at the discretion of the prescriber.   12. I agree to fill all of my controlled medications at an in-state ([place]) pharmacy.   13. I understand that [facility] utilizes the [place] All Schedule Prescription Electronic Reporting (VIOLET) system and will monitor my prescription history via this source.  14. Benzodiazepines are drugs prescribed to treat conditions like anxiety, insomnia, and seizures. Examples of these drugs include: alprazolam, clonazepam, diazepam, and lorazepam. The FDA has applied a Black Box Warning (one of the strictest warnings) that the use of opioids and benzodiazepines together have serious risks to include unusual dizziness or lightheadedness, extreme sleepiness, slowed or difficult breathing, coma and death. There is an added risk if alcohol is also ingested. It is the policy of [facility] to NOT prescribe benzodiazepines to patients who also use opioids. If a patient already presents already prescribed both, the prescriber my direct the patient to their previous provider who prescribed it or taper the benzodiazepine as part of the treatment plan. These patients must be monitored at appropriate intervals and so visits may be more frequent.     This  APRN has discussed and reviewed this information with the patient and/or guardian. The patient and/or guardian verbally agreed (no signatures are obtained during today's visit as they are a telehealth patient and is unable to print and sign this document, therefore, verbal agreement has been obtained).     -Continue Adderall XR 20 daily for ADHD symptoms.  -Continue 50 mg of sertraline for anxiety symptoms.   -Encourage patient to get an EKG for routine stimulant use, order placed.      Counseled patient regarding multimodal approach with healthy nutrition, healthy sleep, regular physical activity, social activities, counseling, and medications.      Coping skills reviewed and encouraged positive framing of thoughts     Assisted patient in processing above session content; acknowledged and normalized patient’s thoughts, feelings, and concerns.  Applied  positive coping skills and behavior management in session.  Allowed patient to freely discuss issues without interruption or judgment. Provided safe, confidential environment to facilitate the development of positive therapeutic relationship and encourage open, honest communication. Assisted patient in identifying risk factors which would indicate the need for higher level of care including thoughts to harm self or others and/or self-harming behavior and encouraged patient to contact this office, call 911, or present to the nearest emergency room should any of these events occur. Discussed crisis intervention services and means to access.     MEDS ORDERED DURING VISIT:  New Medications Ordered This Visit   Medications   • amphetamine-dextroamphetamine XR (Adderall XR) 20 MG 24 hr capsule     Sig: Take 1 capsule by mouth Every Morning     Dispense:  30 capsule     Refill:  0           Follow Up   Return in about 3 months (around 2/16/2023), or if symptoms worsen or fail to improve, for Recheck.  Encouraged patient to contact the clinic if sooner appointment needed or  concerns she verbalized understanding.    Patient was given instructions and counseling regarding her condition or for health maintenance advice. Please see specific information pulled into the AVS if appropriate.     Some of the data in this electronic note has been brought forward from a previous encounter, any necessary changes have been made, it has been reviewed by this APRN, and it is accurate.        This document has been electronically signed by EVELINA Peace  November 16, 2022 11:55 EST    Part of this note may be an electronic transcription/translation of spoken language to printed text using the Dragon Dictation System.

## 2022-12-01 ENCOUNTER — APPOINTMENT (OUTPATIENT)
Dept: WOMENS IMAGING | Facility: HOSPITAL | Age: 57
End: 2022-12-01

## 2022-12-01 PROCEDURE — 77063 BREAST TOMOSYNTHESIS BI: CPT | Performed by: RADIOLOGY

## 2022-12-01 PROCEDURE — 77067 SCR MAMMO BI INCL CAD: CPT | Performed by: RADIOLOGY

## 2022-12-28 ENCOUNTER — PRE-PROCEDURE SCREENING (OUTPATIENT)
Dept: GASTROENTEROLOGY | Facility: CLINIC | Age: 57
End: 2022-12-28

## 2022-12-28 NOTE — TELEPHONE ENCOUNTER
Colonoscopy screening--No personal history of polyps--Family history of polyps --No family history of colon cancer--No blood thinners--Medications:                      amphetamine-dextroamphetamine XR (Adderall XR) 20 MG 24 hr capsule    norethindrone-ethinyl estradiol (FEMHRT 1/5) 1-5 MG-MCG tablet    sertraline (Zoloft) 50 MG tablet    Synthroid 137 MCG tablet    traZODone (DESYREL) 50 MG tablet           QUESTIONNAIRE SCEEENING  HAS BEEN SENT TO DOCTOR FOR REVIEW

## 2023-01-05 ENCOUNTER — PREP FOR SURGERY (OUTPATIENT)
Dept: OTHER | Facility: HOSPITAL | Age: 58
End: 2023-01-05
Payer: COMMERCIAL

## 2023-01-05 DIAGNOSIS — Z83.71 FH: COLON POLYPS: Primary | ICD-10-CM

## 2023-01-09 DIAGNOSIS — F90.2 ATTENTION DEFICIT HYPERACTIVITY DISORDER, COMBINED TYPE: ICD-10-CM

## 2023-01-09 RX ORDER — DEXTROAMPHETAMINE SACCHARATE, AMPHETAMINE ASPARTATE MONOHYDRATE, DEXTROAMPHETAMINE SULFATE AND AMPHETAMINE SULFATE 5; 5; 5; 5 MG/1; MG/1; MG/1; MG/1
20 CAPSULE, EXTENDED RELEASE ORAL EVERY MORNING
Qty: 30 CAPSULE | Refills: 0 | Status: SHIPPED | OUTPATIENT
Start: 2023-01-09 | End: 2023-02-15

## 2023-01-16 ENCOUNTER — TELEPHONE (OUTPATIENT)
Dept: GASTROENTEROLOGY | Facility: CLINIC | Age: 58
End: 2023-01-16
Payer: COMMERCIAL

## 2023-01-27 PROBLEM — Z83.71 FH: COLON POLYPS: Status: ACTIVE | Noted: 2023-01-27

## 2023-01-27 PROBLEM — Z83.719 FH: COLON POLYPS: Status: ACTIVE | Noted: 2023-01-27

## 2023-01-27 NOTE — TELEPHONE ENCOUNTER
MACIEJ Artis for colonoscopy on 4/25/23  arrive at 1pm   . Mailed Prep instructions to Mailing address on-file. ----[employer]iralax

## 2023-02-07 DIAGNOSIS — G47.00 INSOMNIA, UNSPECIFIED TYPE: ICD-10-CM

## 2023-02-07 RX ORDER — TRAZODONE HYDROCHLORIDE 50 MG/1
TABLET ORAL
Qty: 30 TABLET | Refills: 3 | Status: SHIPPED | OUTPATIENT
Start: 2023-02-07

## 2023-02-15 ENCOUNTER — TELEMEDICINE (OUTPATIENT)
Dept: PSYCHIATRY | Facility: CLINIC | Age: 58
End: 2023-02-15
Payer: COMMERCIAL

## 2023-02-15 DIAGNOSIS — F90.2 ATTENTION DEFICIT HYPERACTIVITY DISORDER, COMBINED TYPE: Primary | ICD-10-CM

## 2023-02-15 PROCEDURE — 99213 OFFICE O/P EST LOW 20 MIN: CPT | Performed by: NURSE PRACTITIONER

## 2023-02-15 RX ORDER — DEXTROAMPHETAMINE SACCHARATE, AMPHETAMINE ASPARTATE MONOHYDRATE, DEXTROAMPHETAMINE SULFATE AND AMPHETAMINE SULFATE 5; 5; 5; 5 MG/1; MG/1; MG/1; MG/1
20 CAPSULE, EXTENDED RELEASE ORAL EVERY MORNING
Qty: 30 CAPSULE | Refills: 0 | Status: SHIPPED | OUTPATIENT
Start: 2023-02-15 | End: 2023-03-16 | Stop reason: SDUPTHER

## 2023-02-15 NOTE — PROGRESS NOTES
[facility] provider [facility], [address] [place] [address].The Patient is seen remotely at home, [address] [place] [address] via [employer]. Patient is being seen via telehealth and confirm that they are in a secure environment for this session. The patient's condition being diagnosed/treated is appropriate for telemedicine. The provider identified himself/herself: herself as well as her credentials.   The patient gave consent to be seen remotely, and when consent is given they understand that the consent allows for patient identifiable information to be sent to a third party as needed.   They may refuse to be seen remotely at any time. The electronic data is encrypted and password protected, and the patient has been advised of the potential risks to privacy not withstanding such measures.    You have chosen to receive care through a telehealth visit.  Do you consent to use a video/audio connection for your medical care today? Yes. Patient verified Name, , and address.       Chief Complaint  ADHD management     Subjective    Josefa Nazario presents to [facility] for medication management.     History of Present Illness   Patient presents today for her 3-month follow-up.  Patient notes that she has been doing good.  Denies any heart issues palpitations dizziness or concerns.  She states that she does see her primary care physician in  and plans on talking with her about an EKG and lab work-up.  Mentioned to patient that we will need a urine drug screen she noted that she will get what she got the office with her PCP.  Patient denied any depressive symptoms or feeling hopeless or helpless.  Reports that her anxiety has been slightly up due to a death in the family but otherwise denies any major issues or panic attacks or concerns.  Reports her appetite is good.  States her sleep is the same as she is up and down but  denies any new issues or concerns.  Patient states that they gave her generic Adderall and she did feel a rush within the first hour of taking it however it was not a significant issue or alarming.  She did feel however the brand name only of Adderall helped her mood more even as well as focus and attention more even throughout the day without the hyperactivity at first.  Patient states it was not bothersome but it was different from what she had been taking.  Patient denies any new medical or medicine changes as she sees her PCP next month for a routine checkup.  Denies any side effects.  Denies any SI/HI/AH/VH.      Without the medication patient has problems with attention and focus including easily distracted, easily losing objects, trouble with time management, trouble completing tasks because of distractions, procrastination, indecisiveness, spends lots of extra time compensating for trouble with inattention/focus by making multiple to-do lists, careless mistakes at work, not finishing jobs that are started.  Patient denies any side effects, no worsening of insomnia, no worsening of anxiety on the medication dose.       Objective   Vital Signs:   There were no vitals taken for this visit.  Due to the remote nature of this encounter (virtual encounter), vitals were unable to be obtained.  Height stated at 62 inches.  Weight stated at 173 pounds.      PHQ-9 Score:   PHQ-9 Total Score:       Patient screened  for depression based on a PHQ-9 score of  on . Follow-up recommendations include: see notes and medication list.    PHQ-9 Total Score: 2  PHQ-9 Depression Screening  Little interest or pleasure in doing things? 0-->not at all   Feeling down, depressed, or hopeless? 0-->not at all   Trouble falling or staying asleep, or sleeping too much? 0-->not at all   Feeling tired or having little energy? 2-->more than half the days   Poor appetite or overeating? 0-->not at all   Feeling bad about yourself - or that you  are a failure or have let yourself or your family down? 0-->not at all   Trouble concentrating on things, such as reading the newspaper or watching television? 0-->not at all   Moving or speaking so slowly that other people could have noticed? Or the opposite - being so fidgety or restless that you have been moving around a lot more than usual? 0-->not at all   Thoughts that you would be better off dead, or of hurting yourself in some way? 0-->not at all   PHQ-9 Total Score 2   If you checked off any problems, how difficult have these problems made it for you to do your work, take care of things at home, or get along with other people? not difficult at all                PROMIS scale screening tool that patient filled out virtually reviewed by this APRN at today's encounter.      Mental Status Exam:   Hygiene:   good  Cooperation:  Cooperative  Eye Contact:  Good  Psychomotor Behavior:  Appropriate  Affect:  Appropriate  Mood: normal  Speech:  Normal  Thought Process:  Goal directed  Thought Content:  Normal  Suicidal:  None  Homicidal:  None  Hallucinations:  None  Delusion:  None  Memory:  Intact  Orientation:  Person, Place, Time and Situation  Reliability:  good  Insight:  Good  Judgement:  Good and Fair  Impulse Control:  Good  Physical/Medical Issues:  Yes Hypothyroidism     Current Medications:   Current Outpatient Medications   Medication Sig Dispense Refill   • amoxicillin-clavulanate (AUGMENTIN) 875-125 MG per tablet Take 1 tablet by mouth 2 (Two) Times a Day. 20 tablet 0   • amphetamine-dextroamphetamine XR (Adderall XR) 20 MG 24 hr capsule Take 1 capsule by mouth Every Morning 30 capsule 0   • estradiol (ESTRACE) 0.5 MG tablet      • ipratropium (ATROVENT) 0.06 % nasal spray 2 sprays into the nostril(s) as directed by provider 4 (Four) Times a Day. 1 each 0   • predniSONE (DELTASONE) 50 MG tablet Take 1 tablet by mouth Daily With Breakfast. 5 tablet 0   • sertraline (Zoloft) 50 MG tablet Take 1 tablet by  mouth Daily. 90 tablet 3   • Synthroid 137 MCG tablet Take 1 tablet by mouth Daily. 90 tablet 2   • traZODone (DESYREL) 50 MG tablet TAKE ONE TABLET BY MOUTH ONCE NIGHTLY 30 tablet 3     No current facility-administered medications for this visit.       Physical Exam  Nursing note reviewed.   Constitutional:       Appearance: Normal appearance.   Neurological:      Mental Status: She is alert.   Psychiatric:         Attention and Perception: Attention and perception normal.         Mood and Affect: Mood and affect normal.         Speech: Speech normal.         Behavior: Behavior is cooperative.         Thought Content: Thought content normal.         Cognition and Memory: Cognition and memory normal.         Judgment: Judgment normal.        Result Review :     The following data was reviewed by: EVELINA Peace on 08/18/2022:  Common labs    Common Labs 3/25/22 3/25/22 10/11/22 10/11/22    0910 0910 0935 0935   Glucose  97  98   BUN  11  11   Creatinine  0.82  0.64   Sodium  137  138   Potassium  4.5  4.4   Chloride  101  104   Calcium  9.3  9.1   Total Protein  7.1  6.9   Albumin  4.3  4.50   Total Bilirubin  0.5  0.3   Alkaline Phosphatase  70  66   AST (SGOT)  23  21   ALT (SGPT)  22  18   Total Cholesterol 234 (A)  241 (A)    Triglycerides 104  137    HDL Cholesterol 58  51    LDL Cholesterol  158 (A)  165 (A)    (A) Abnormal value       Comments are available for some flowsheets but are not being displayed.           Data reviewed: PCP notes        Assessment and Plan    Problem List Items Addressed This Visit    None  Visit Diagnoses     Attention deficit hyperactivity disorder, combined type    -  Primary    Relevant Medications    amphetamine-dextroamphetamine XR (Adderall XR) 20 MG 24 hr capsule            TREATMENT PLAN/GOALS: Continue supportive psychotherapy efforts and medications as indicated. Treatment and medication options discussed during today's visit. Patient ackowledged and verbally  "consented to continue with current treatment plan and was educated on the importance of compliance with treatment and follow-up appointments.    MEDICATION ISSUES:  We discussed risks, benefits, and side effects of the above medications and the patient was agreeable with the plan. Patient was educated on the importance of compliance with treatment and follow-up appointments.  Patient is agreeable to call the office with any worsening of symptoms or onset of side effects. Patient is agreeable to call 911 or go to the nearest ER should he/she begin having SI/HI.      Controlled Substance Medication Contract    Controlled substance medications (i.e. benzodiazepines, opioids, amphetamines) are very useful, but have a high potential for misuse and are, therefore, closely controlled by local, state, and federal government(s). As a patient of [facility], you agree and understand the followin. I am responsible for the controlled substance medications prescribed to me. If my prescription is misplaced, stolen, or if \"I run out early,\" I understand this medication will not be replaced regardless of the circumstances.  2. Refills of controlled substance medications: (a) Will be made only during regular office hours Monday-Thursday once a month and during a scheduled virtual appointment. Refills will not be made at night, weekends, or on holidays. (b) Will not be made if \"I lost my prescriptions,\" \"ran out early,\" or \"misplaced my medication\". I am solely responsible for taking the medication as prescribed and for keeping track of the remaining.   3. I agree to comply with urine drug testing and pill counts at the provider's discretion, thereby, documenting the proper use of any medications. If alcohol abuse is suspected, a breathalyzer or blood alcohol level may be ordered. Unannounced urine or serum toxicology specimens may be requested and my cooperation is required.  4. I understand that " if I violate any of the above conditions, my prescriptions for controlled medications my be terminated. If the violation involves obtaining these medications from another individual, or the concomitant use of non-prescription illicit (illegal) drugs, I may also be reported to other providers, pharmacies, medical facilities and the appropriate authorities.   5. I further understand that if I violate this controlled substance contract due to non-compliance of medical directions, such as the failure in taking medications as prescribed, utilizing other illicit drugs, or abuse of controlled medications, the prescription for controlled medications may be terminated.   6. I agree to keep my scheduled appointments and conduct myself in a courteous manner.  7. I agree not to sell, share, or give any medication to another person. I understand that such mishandling of my medication is a serious violation of this agreement and would result in my treatment being terminated without any recourse for appeal.   8. I agree not to take my medication from any physicians, nurse practitioners, pharmacies or other sources without telling my prescriber.  9. I agree to take my medication as my prescriber has instructed and not to alter the way I take my medication without first consulting my prescriber.  10. I agree to abstain from problematic alcohol usage, opioids, marijuana, cocaine, and other addictive substances.   11. If I am legally involved related to legal or illegal drugs, including alcohol, refill of controlled substances will not be given until a re-evaluation of my chemical dependency treatment plan has been completed. Refills are at the discretion of the prescriber.   12. I agree to fill all of my controlled medications at an in-state ([place]) pharmacy.   13. I understand that [facility] utilizes the [place] All Schedule Prescription Electronic Reporting (VIOLET) system and will monitor my  prescription history via this source.  14. Benzodiazepines are drugs prescribed to treat conditions like anxiety, insomnia, and seizures. Examples of these drugs include: alprazolam, clonazepam, diazepam, and lorazepam. The FDA has applied a Black Box Warning (one of the strictest warnings) that the use of opioids and benzodiazepines together have serious risks to include unusual dizziness or lightheadedness, extreme sleepiness, slowed or difficult breathing, coma and death. There is an added risk if alcohol is also ingested. It is the policy of [facility] to NOT prescribe benzodiazepines to patients who also use opioids. If a patient already presents already prescribed both, the prescriber my direct the patient to their previous provider who prescribed it or taper the benzodiazepine as part of the treatment plan. These patients must be monitored at appropriate intervals and so visits may be more frequent.     This APRN has discussed and reviewed this information with the patient and/or guardian. The patient and/or guardian verbally agreed (no signatures are obtained during today's visit as they are a telehealth patient and is unable to print and sign this document, therefore, verbal agreement has been obtained).     -Continue Adderall XR 20 daily for ADHD symptoms.  Will request brand name only since patient noticed a difference with the generic however she states if her insurance will not cover will stay with generic.  -Continue 50 mg of sertraline for anxiety symptoms.  [facility]  -Patient states she sees her PCP 1 March so she will obtain an EKG as well as UDS.      Counseled patient regarding multimodal approach with healthy nutrition, healthy sleep, regular physical activity, social activities, counseling, and medications.      Coping skills reviewed and encouraged positive framing of thoughts     Assisted patient in processing above session content;  acknowledged and normalized patient’s thoughts, feelings, and concerns.  Applied  positive coping skills and behavior management in session.  Allowed patient to freely discuss issues without interruption or judgment. Provided safe, confidential environment to facilitate the development of positive therapeutic relationship and encourage open, honest communication. Assisted patient in identifying risk factors which would indicate the need for higher level of care including thoughts to harm self or others and/or self-harming behavior and encouraged patient to contact this office, call 911, or present to the nearest emergency room should any of these events occur. Discussed crisis intervention services and means to access.     MEDS ORDERED DURING VISIT:  New Medications Ordered This Visit   Medications   • amphetamine-dextroamphetamine XR (Adderall XR) 20 MG 24 hr capsule     Sig: Take 1 capsule by mouth Every Morning     Dispense:  30 capsule     Refill:  0     Brand name only patient seen difference in Generic. If insurance will cover.           Follow Up   Return in about 3 months (around 5/15/2023), or if symptoms worsen or fail to improve, for Recheck.  Encouraged patient to contact the clinic if sooner appointment needed or concerns she verbalized understanding.    Patient was given instructions and counseling regarding her condition or for health maintenance advice. Please see specific information pulled into the AVS if appropriate.     Some of the data in this electronic note has been brought forward from a previous encounter, any necessary changes have been made, it has been reviewed by this APRN, and it is accurate.        This document has been electronically signed by EVELINA Peace  February 15, 2023 11:48 EST    Part of this note may be an electronic transcription/translation of spoken language to printed text using the Dragon Dictation System.

## 2023-03-13 ENCOUNTER — OFFICE VISIT (OUTPATIENT)
Dept: FAMILY MEDICINE CLINIC | Facility: CLINIC | Age: 58
End: 2023-03-13
Payer: COMMERCIAL

## 2023-03-13 VITALS
HEIGHT: 63 IN | HEART RATE: 80 BPM | TEMPERATURE: 98.4 F | SYSTOLIC BLOOD PRESSURE: 128 MMHG | OXYGEN SATURATION: 90 % | RESPIRATION RATE: 18 BRPM | DIASTOLIC BLOOD PRESSURE: 84 MMHG | BODY MASS INDEX: 30.12 KG/M2 | WEIGHT: 170 LBS

## 2023-03-13 DIAGNOSIS — M79.643 CHRONIC HAND PAIN, UNSPECIFIED LATERALITY: ICD-10-CM

## 2023-03-13 DIAGNOSIS — Z00.00 ANNUAL PHYSICAL EXAM: Primary | ICD-10-CM

## 2023-03-13 DIAGNOSIS — E03.9 HYPOTHYROIDISM (ACQUIRED): ICD-10-CM

## 2023-03-13 DIAGNOSIS — F15.20 STIMULANT DEPENDENCE: ICD-10-CM

## 2023-03-13 DIAGNOSIS — F90.9 ATTENTION DEFICIT HYPERACTIVITY DISORDER (ADHD), UNSPECIFIED ADHD TYPE: ICD-10-CM

## 2023-03-13 DIAGNOSIS — G89.29 CHRONIC HAND PAIN, UNSPECIFIED LATERALITY: ICD-10-CM

## 2023-03-13 DIAGNOSIS — E78.5 HYPERLIPIDEMIA, UNSPECIFIED HYPERLIPIDEMIA TYPE: ICD-10-CM

## 2023-03-13 LAB
APPEARANCE UR: CLEAR
BILIRUB UR QL STRIP: NEGATIVE
COLOR UR: YELLOW
GLUCOSE UR QL STRIP: NEGATIVE
HGB UR QL STRIP: NEGATIVE
KETONES UR QL STRIP: NEGATIVE
LEUKOCYTE ESTERASE UR QL STRIP: NEGATIVE
NITRITE UR QL STRIP: NEGATIVE
PH UR STRIP: 6 [PH] (ref 5–8)
PROT UR QL STRIP: NEGATIVE
SP GR UR STRIP: 1.02 (ref 1–1.03)
UROBILINOGEN UR STRIP-MCNC: NORMAL MG/DL

## 2023-03-13 PROCEDURE — 99396 PREV VISIT EST AGE 40-64: CPT | Performed by: FAMILY MEDICINE

## 2023-03-13 RX ORDER — PROGESTERONE 100 MG/1
CAPSULE ORAL
COMMUNITY
Start: 2023-03-02

## 2023-03-13 NOTE — ASSESSMENT & PLAN NOTE
Multiple hand surgeries, about 6   States she get lab work to check for RA periodically as mother had RA   She has had pain daily

## 2023-03-13 NOTE — ASSESSMENT & PLAN NOTE
Chronic, stable on current medication   Check TSH  Continue current dose, adjust dosage as indicated based upon report.

## 2023-03-13 NOTE — ASSESSMENT & PLAN NOTE
Psychological condition is chronic, stable on medication .  Continue current treatment regimen.  continue follow up with psychiatry who is prescribing.    She would like to have uds taken in office today vs going to hospital.   Order placed   Psychological condition  will be reassessed at the next regular appointment.

## 2023-03-13 NOTE — PROGRESS NOTES
Chief Complaint  Annual Exam (Left eye swelling)    Subjective            Josefa Nazario presents to [facility]  History of Present Illness    CPE- Patient reporting that health is doing well overall.  Patient is here today for annual physical.  Eating a balanced diet. Not eating any red meat.  States exercise 30min daily walking about [id number] steps.    Sinus surgery 2 years ago and TMJ issues.     Seven joint surgeries.  States mother had RA.  Still pain in hands daily     Taking Adderral as prescribed, no issue with heart racing or palpitations.  Taking medication for years without issues.     Labs: Due for routine labs  Colorectal cancer screening:  Scheduled April 24th  Breast cancer screening:  Up to date, completed Dec2022  Cervical cancer screening: up to date, completed Dec 2022 with [facility].         Immunization History   Administered Date(s) Administered   • COVID-19 (PFIZER) PURPLE CAP 03/17/2021, 04/07/2021   • Tdap 07/29/2021      Patient has seen dentist within last 6 months  yes  Patient has seen eye specialist within last 6 months yes    PHQ-2 Depression Screening  Little interest or pleasure in doing things? 0-->not at all   Feeling down, depressed, or hopeless? 0-->not at all   PHQ-2 Total Score 0      Social History     Socioeconomic History   • Marital status:      Spouse name:    • Number of children: 3   • Highest education level: Bachelor's degree (e.g., BA, AB, BS)   Tobacco Use   • Smoking status: Never   • Smokeless tobacco: Never   Vaping Use   • Vaping Use: Never used   Substance and Sexual Activity   • Alcohol use: Yes     Comment: Social   • Drug use: No   • Sexual activity: Defer     Birth control/protection: Post-menopausal          Review of Systems   Constitutional: Negative for chills and fever.   HENT: Positive for congestion. Negative for rhinorrhea and sneezing.    Respiratory: Negative for cough and shortness of breath.   "  Cardiovascular: Negative for chest pain and leg swelling.   Gastrointestinal: Negative for abdominal pain, constipation, diarrhea, nausea and vomiting.   Genitourinary: Negative for difficulty urinating.   Musculoskeletal: Positive for arthralgias. Negative for back pain.        Hands   Skin: Negative for rash.   Neurological: Negative for dizziness and headaches.   Psychiatric/Behavioral: Negative for behavioral problems and sleep disturbance.         Objective   Vital Signs:   /84 (BP Location: Left arm, Patient Position: Sitting, Cuff Size: Adult)   Pulse 80   Temp 98.4 °F (36.9 °C) (Infrared)   Resp 18   Ht 158.8 cm (62.5\")   Wt 77.1 kg (170 lb)   SpO2 90%   BMI 30.60 kg/m²     Physical Exam  Constitutional:       General: She is not in acute distress.     Appearance: She is not ill-appearing.   HENT:      Head: Normocephalic.      Right Ear: Tympanic membrane normal.      Left Ear: Tympanic membrane normal.      Mouth/Throat:      Mouth: Mucous membranes are moist.      Pharynx: No oropharyngeal exudate.   Eyes:      Conjunctiva/sclera: Conjunctivae normal.   Cardiovascular:      Rate and Rhythm: Regular rhythm.      Heart sounds: Normal heart sounds.   Pulmonary:      Effort: No respiratory distress.      Breath sounds: Normal breath sounds. No wheezing.   Abdominal:      General: There is no distension.      Palpations: Abdomen is soft.      Tenderness: There is no abdominal tenderness.   Musculoskeletal:      Cervical back: Neck supple.      Right lower leg: No edema.      Left lower leg: No edema.   Neurological:      Mental Status: She is alert and oriented to person, place, and time.   Psychiatric:         Mood and Affect: Mood normal.        Result Review :   The following data was reviewed by: Suzanne Thomas MD on 03/13/2023:  Common labs    Common Labs 3/25/22 3/25/22 10/11/22 10/11/22    0910 0910 0935 0935   Glucose  97  98   BUN  11  11   Creatinine  0.82  0.64   Sodium  137  138 "   Potassium  4.5  4.4   Chloride  101  104   Calcium  9.3  9.1   Total Protein  7.1  6.9   Albumin  4.3  4.50   Total Bilirubin  0.5  0.3   Alkaline Phosphatase  70  66   AST (SGOT)  23  21   ALT (SGPT)  22  18   Total Cholesterol 234 (A)  241 (A)    Triglycerides 104  137    HDL Cholesterol 58  51    LDL Cholesterol  158 (A)  165 (A)    (A) Abnormal value       Comments are available for some flowsheets but are not being displayed.                      Current Outpatient Medications:   •  amphetamine-dextroamphetamine XR (Adderall XR) 20 MG 24 hr capsule, Take 1 capsule by mouth Every Morning, Disp: 30 capsule, Rfl: 0  •  estradiol (ESTRACE) 0.5 MG tablet, , Disp: , Rfl:   •  Progesterone (PROMETRIUM) 100 MG capsule, , Disp: , Rfl:   •  sertraline (Zoloft) 50 MG tablet, Take 1 tablet by mouth Daily., Disp: 90 tablet, Rfl: 3  •  Synthroid 137 MCG tablet, Take 1 tablet by mouth Daily., Disp: 90 tablet, Rfl: 2  •  traZODone (DESYREL) 50 MG tablet, TAKE ONE TABLET BY MOUTH ONCE NIGHTLY, Disp: 30 tablet, Rfl: 3          Assessment and Plan    Diagnoses and all orders for this visit:    1. Annual physical exam (Primary)  -     Cancel: Urinalysis without microscopic (no culture) - Urine, Clean Catch  -     Urinalysis without microscopic (no culture) - Urine, Clean Catch    2. Stimulant dependence (HCC)  -     ToxAssure Flex 14, Urine - Urine, Clean Catch; Future    3. Hypothyroidism (acquired)  Assessment & Plan:  Chronic, stable on current medication   Check TSH  Continue current dose, adjust dosage as indicated based upon report.     Orders:  -     TSH Rfx On Abnormal To Free T4  -     Cancel: Urinalysis without microscopic (no culture) - Urine, Clean Catch  -     Urinalysis without microscopic (no culture) - Urine, Clean Catch    4. Hyperlipidemia, unspecified hyperlipidemia type  Assessment & Plan:  Lipid abnormalities are chronic, .  monitor lipid panel   Low fat, high fiber diet.   Lipids will be reassessed in 6  months.    Orders:  -     Comprehensive metabolic panel  -     Lipid panel  -     Cancel: Urinalysis without microscopic (no culture) - Urine, Clean Catch  -     Urinalysis without microscopic (no culture) - Urine, Clean Catch    5. Attention deficit hyperactivity disorder (ADHD), unspecified ADHD type  Assessment & Plan:  Psychological condition is chronic, stable on medication .  Continue current treatment regimen.  continue follow up with psychiatry who is prescribing.    She would like to have uds taken in office today vs going to hospital.   Order placed   Psychological condition  will be reassessed at the next regular appointment.      6. Chronic hand pain, unspecified laterality  Assessment & Plan:  Multiple hand surgeries, about 7   States she get lab work to check for RA periodically as mother had RA   She has had pain daily     Orders:  -     Rheumatoid Factor  -     Sedimentation rate  -     SHERRI Direct Reflex to 11 [employer]    Follow up with ENT regarding ear pain and TMJ issues. She had sinus surgery a few years ago, some swelling in L eye since procedure.     Following with psychiatry for ADHD , need to get tox        The patient was counseled regarding nutrition, physical activity, healthy weight, injury prevention, misuse of tobacco, alcohol and illicit drugs, mental health, immunizations, and screenings.      Follow Up   Return in about 1 year (around 3/13/2024) for Annual physical.  Patient was given instructions and counseling regarding her condition or for health maintenance advice. Please see specific information pulled into the AVS if appropriate.

## 2023-03-13 NOTE — ASSESSMENT & PLAN NOTE
Lipid abnormalities are chronic, .  monitor lipid panel   Low fat, high fiber diet.   Lipids will be reassessed in 6 months.

## 2023-03-14 LAB
ALBUMIN SERPL-MCNC: 4.2 G/DL (ref 3.8–4.9)
ALBUMIN/GLOB SERPL: 1.6 {RATIO} (ref 1.2–2.2)
ALP SERPL-CCNC: 87 IU/L (ref 44–121)
ALT SERPL-CCNC: 19 IU/L (ref 0–32)
ANA SER QL: NEGATIVE
AST SERPL-CCNC: 24 IU/L (ref 0–40)
BILIRUB SERPL-MCNC: 0.5 MG/DL (ref 0–1.2)
BUN SERPL-MCNC: 12 MG/DL (ref 6–24)
BUN/CREAT SERPL: 16 (ref 9–23)
CALCIUM SERPL-MCNC: 9.3 MG/DL (ref 8.7–10.2)
CHLORIDE SERPL-SCNC: 103 MMOL/L (ref 96–106)
CHOLEST SERPL-MCNC: 227 MG/DL (ref 100–199)
CO2 SERPL-SCNC: 22 MMOL/L (ref 20–29)
CREAT SERPL-MCNC: 0.76 MG/DL (ref 0.57–1)
EGFRCR SERPLBLD CKD-EPI 2021: 91 ML/MIN/1.73
ERYTHROCYTE [SEDIMENTATION RATE] IN BLOOD BY WESTERGREN METHOD: 6 MM/HR (ref 0–40)
GLOBULIN SER CALC-MCNC: 2.6 G/DL (ref 1.5–4.5)
GLUCOSE SERPL-MCNC: 95 MG/DL (ref 70–99)
HDLC SERPL-MCNC: 57 MG/DL
LDLC SERPL CALC-MCNC: 140 MG/DL (ref 0–99)
POTASSIUM SERPL-SCNC: 4.5 MMOL/L (ref 3.5–5.2)
PROT SERPL-MCNC: 6.8 G/DL (ref 6–8.5)
RHEUMATOID FACT SERPL-ACNC: 10.2 IU/ML
SODIUM SERPL-SCNC: 143 MMOL/L (ref 134–144)
T4 FREE SERPL-MCNC: 1.11 NG/DL (ref 0.82–1.77)
TRIGL SERPL-MCNC: 168 MG/DL (ref 0–149)
TSH SERPL DL<=0.005 MIU/L-ACNC: 5.65 UIU/ML (ref 0.45–4.5)
VLDLC SERPL CALC-MCNC: 30 MG/DL (ref 5–40)

## 2023-03-16 DIAGNOSIS — F90.2 ATTENTION DEFICIT HYPERACTIVITY DISORDER, COMBINED TYPE: ICD-10-CM

## 2023-03-16 RX ORDER — DEXTROAMPHETAMINE SACCHARATE, AMPHETAMINE ASPARTATE MONOHYDRATE, DEXTROAMPHETAMINE SULFATE AND AMPHETAMINE SULFATE 5; 5; 5; 5 MG/1; MG/1; MG/1; MG/1
20 CAPSULE, EXTENDED RELEASE ORAL EVERY MORNING
Qty: 30 CAPSULE | Refills: 0 | Status: SHIPPED | OUTPATIENT
Start: 2023-03-16

## 2023-03-20 ENCOUNTER — TELEPHONE (OUTPATIENT)
Dept: FAMILY MEDICINE CLINIC | Facility: CLINIC | Age: 58
End: 2023-03-20
Payer: COMMERCIAL

## 2023-03-20 NOTE — TELEPHONE ENCOUNTER
Hub staff attempted to follow warm transfer process and was unsuccessful     Caller: Josefa Nazario    Relationship to patient: Self    Best call back number: [phone]     Patient is needing: PATIENT STATES SHE WAS RETURNING A CALL TO [facility] REGARDING LABS.

## 2023-04-14 ENCOUNTER — TELEPHONE (OUTPATIENT)
Dept: FAMILY MEDICINE CLINIC | Facility: CLINIC | Age: 58
End: 2023-04-14
Payer: COMMERCIAL

## 2023-04-14 NOTE — TELEPHONE ENCOUNTER
Caller: Josefa Nazario    Best call back number: [phone]    What orders are you requesting (i.e. lab or imaging): LABS    Additional notes: PATIENT NEEDS LAB ORDERS FOR HER PHYSICAL FROM 3/13/2023.    PLEASE CALL TO SCHEDULE WHEN ORDERS ARE IN.

## 2023-04-17 NOTE — TELEPHONE ENCOUNTER
She had labs completed the day of her physical 3/13 results in chart. She was suppose to make a follow up appt sometimes in June-July to recheck thyroid.

## 2023-04-25 ENCOUNTER — ANESTHESIA (OUTPATIENT)
Dept: GASTROENTEROLOGY | Facility: HOSPITAL | Age: 58
End: 2023-04-25
Payer: COMMERCIAL

## 2023-04-25 ENCOUNTER — HOSPITAL ENCOUNTER (OUTPATIENT)
Facility: HOSPITAL | Age: 58
Setting detail: HOSPITAL OUTPATIENT SURGERY
Discharge: HOME | End: 2023-04-25
Attending: INTERNAL MEDICINE | Admitting: INTERNAL MEDICINE
Payer: COMMERCIAL

## 2023-04-25 ENCOUNTER — ANESTHESIA EVENT (OUTPATIENT)
Dept: GASTROENTEROLOGY | Facility: HOSPITAL | Age: 58
End: 2023-04-25
Payer: COMMERCIAL

## 2023-04-25 VITALS
DIASTOLIC BLOOD PRESSURE: 75 MMHG | WEIGHT: 165 LBS | HEIGHT: 62 IN | SYSTOLIC BLOOD PRESSURE: 116 MMHG | OXYGEN SATURATION: 98 % | HEART RATE: 53 BPM | BODY MASS INDEX: 30.36 KG/M2 | RESPIRATION RATE: 16 BRPM

## 2023-04-25 DIAGNOSIS — Z83.71 FH: COLON POLYPS: ICD-10-CM

## 2023-04-25 PROCEDURE — 88305 TISSUE EXAM BY PATHOLOGIST: CPT | Performed by: INTERNAL MEDICINE

## 2023-04-25 PROCEDURE — 0 LIDOCAINE 1 % SOLUTION: Performed by: NURSE ANESTHETIST, CERTIFIED REGISTERED

## 2023-04-25 PROCEDURE — S0260 H&P FOR SURGERY: HCPCS | Performed by: INTERNAL MEDICINE

## 2023-04-25 PROCEDURE — 25010000002 PROPOFOL 10 MG/ML EMULSION: Performed by: NURSE ANESTHETIST, CERTIFIED REGISTERED

## 2023-04-25 RX ORDER — SODIUM CHLORIDE, SODIUM LACTATE, POTASSIUM CHLORIDE, CALCIUM CHLORIDE 600; 310; 30; 20 MG/100ML; MG/100ML; MG/100ML; MG/100ML
30 INJECTION, SOLUTION INTRAVENOUS CONTINUOUS PRN
Status: DISCONTINUED | OUTPATIENT
Start: 2023-04-25 | End: 2023-04-25 | Stop reason: HOSPADM

## 2023-04-25 RX ORDER — LIDOCAINE HYDROCHLORIDE 10 MG/ML
INJECTION, SOLUTION INFILTRATION; PERINEURAL AS NEEDED
Status: DISCONTINUED | OUTPATIENT
Start: 2023-04-25 | End: 2023-04-25 | Stop reason: SURG

## 2023-04-25 RX ORDER — SODIUM CHLORIDE 0.9 % (FLUSH) 0.9 %
10 SYRINGE (ML) INJECTION AS NEEDED
Status: DISCONTINUED | OUTPATIENT
Start: 2023-04-25 | End: 2023-04-25 | Stop reason: HOSPADM

## 2023-04-25 RX ORDER — SODIUM CHLORIDE 9 MG/ML
40 INJECTION, SOLUTION INTRAVENOUS AS NEEDED
Status: DISCONTINUED | OUTPATIENT
Start: 2023-04-25 | End: 2023-04-25 | Stop reason: HOSPADM

## 2023-04-25 RX ORDER — SODIUM CHLORIDE 0.9 % (FLUSH) 0.9 %
10 SYRINGE (ML) INJECTION EVERY 12 HOURS SCHEDULED
Status: DISCONTINUED | OUTPATIENT
Start: 2023-04-25 | End: 2023-04-25 | Stop reason: HOSPADM

## 2023-04-25 RX ORDER — PROPOFOL 10 MG/ML
VIAL (ML) INTRAVENOUS AS NEEDED
Status: DISCONTINUED | OUTPATIENT
Start: 2023-04-25 | End: 2023-04-25 | Stop reason: SURG

## 2023-04-25 RX ADMIN — SODIUM CHLORIDE, POTASSIUM CHLORIDE, SODIUM LACTATE AND CALCIUM CHLORIDE 30 ML/HR: 600; 310; 30; 20 INJECTION, SOLUTION INTRAVENOUS at 12:47

## 2023-04-25 RX ADMIN — PROPOFOL 70 MG: 10 INJECTION, EMULSION INTRAVENOUS at 13:04

## 2023-04-25 RX ADMIN — LIDOCAINE HYDROCHLORIDE 30 MG: 10 INJECTION, SOLUTION INFILTRATION; PERINEURAL at 13:04

## 2023-04-25 RX ADMIN — PROPOFOL 180 MCG/KG/MIN: 10 INJECTION, EMULSION INTRAVENOUS at 13:05

## 2023-04-25 NOTE — ANESTHESIA PREPROCEDURE EVALUATION
Anesthesia Evaluation     Patient summary reviewed and Nursing notes reviewed   NPO Solid Status: > 8 hours  NPO Liquid Status: > 2 hours           Airway   Mallampati: II  TM distance: >3 FB  Neck ROM: full  No difficulty expected  Dental - normal exam     Pulmonary - normal exam    breath sounds clear to auscultation  Cardiovascular - normal exam    Rhythm: regular  Rate: normal    (+) valvular problems/murmurs murmur, hyperlipidemia,     PE comment: No murmurs heard    Neuro/Psych  (+) psychiatric history Anxiety and ADD,    GI/Hepatic/Renal/Endo    (+) obesity,   thyroid problem hypothyroidism    Musculoskeletal     (+) back pain,   Abdominal   (+) obese,    Substance History      OB/GYN          Other   arthritis,                    Anesthesia Plan    ASA 2     MAC     intravenous induction     Anesthetic plan, risks, benefits, and alternatives have been provided, discussed and informed consent has been obtained with: patient.    Plan discussed with CRNA.        CODE STATUS:

## 2023-04-25 NOTE — DISCHARGE INSTRUCTIONS

## 2023-04-25 NOTE — H&P
[facility]  Pre Procedure History & Physical    Chief Complaint:   Screening - FH polyps    Subjective     HPI:   58 yo here today for first colonoscopy.  Pt reports FH polyps.  Patient denies GI symptoms currrently.     Past Medical History:   Past Medical History:   Diagnosis Date   • Anxiety    • Arthritis    • Attention deficit disorder    • Encounter for medication management    • Heart murmur    • Hypothyroidism    • Low back pain        Past Surgical History:  Past Surgical History:   Procedure Laterality Date   •  SECTION     • CHOLECYSTECTOMY     • HAND SURGERY      6 hand surgeries   • JOINT REPLACEMENT      Joints replaced in 2 thumbs and one finger   • LIPOMA EXCISION     • SINUS SURGERY         Family History:  Family History   Problem Relation Age of Onset   • Arthritis Mother         RA   • Hypertension Father    • Diabetes Father    • Hypertension Sister    • Malig Hyperthermia Neg Hx        Social History:   reports that she has never smoked. She has never used smokeless tobacco. She reports current alcohol use. She reports that she does not use drugs.    Medications:   Medications Prior to Admission   Medication Sig Dispense Refill Last Dose   • amphetamine-dextroamphetamine XR (Adderall XR) 20 MG 24 hr capsule Take 1 capsule by mouth Every Morning 30 capsule 0 Past Week   • estradiol (ESTRACE) 0.5 MG tablet    Past Week   • Progesterone (PROMETRIUM) 100 MG capsule    Past Week   • Synthroid 137 MCG tablet Take 1 tablet by mouth Daily. 90 tablet 2 2023   • traZODone (DESYREL) 50 MG tablet TAKE ONE TABLET BY MOUTH ONCE NIGHTLY 30 tablet 3 2023   • brompheniramine-pseudoephedrine-DM (Bromfed DM) 30-2-10 MG/5ML syrup Take 5 mL by mouth 4 (Four) Times a Day As Needed for Cough. 119 mL 0    • sertraline (Zoloft) 50 MG tablet Take 1 tablet by mouth Daily. 90 tablet 3        Allergies:  Patient has no known allergies.    ROS:    Pertinent items are noted in HPI,  "all other systems reviewed and negative     Objective     Blood pressure 122/81, pulse 60, resp. rate 16, height 157.5 cm (62\"), weight 74.8 kg (165 lb), last menstrual period 02/22/2019, SpO2 98 %.    Physical Exam   Constitutional: Pt is oriented to person, place, and time and well-developed, well-nourished, and in no distress.   Mouth/Throat: Oropharynx is clear and moist.   Neck: Normal range of motion.   Cardiovascular: Normal rate, regular rhythm    Pulmonary/Chest: Effort normal    Abdominal: Soft. Nontender  Skin: Skin is warm and dry.   Psychiatric: Mood, memory, affect and judgment normal.     Assessment & Plan     Diagnosis:  Screening - FH polyps    Anticipated Surgical Procedure:  colonoscopy    The risks, benefits, and alternatives of this procedure have been discussed with the patient or the responsible party- the patient understands and agrees to proceed.                                                              "

## 2023-04-26 LAB
LAB AP CASE REPORT: NORMAL
PATH REPORT.FINAL DX SPEC: NORMAL
PATH REPORT.GROSS SPEC: NORMAL

## 2023-05-04 ENCOUNTER — TELEPHONE (OUTPATIENT)
Dept: GASTROENTEROLOGY | Facility: CLINIC | Age: 58
End: 2023-05-04
Payer: COMMERCIAL

## 2023-05-04 NOTE — TELEPHONE ENCOUNTER
HM and CS recall completed for 4/25/2028.  It is noted, patient has seen Dr. Hendrickson's message.

## 2023-05-04 NOTE — TELEPHONE ENCOUNTER
----- Message from Noemy Hendrickson MD sent at 5/1/2023  8:57 AM EDT -----  The polyp(s) biopsies showed adenomatous change. This is not cancerous but is considered potentially precancerous. Follow-up colonoscopy in 5 years is advised.

## 2023-05-11 DIAGNOSIS — F15.20 STIMULANT DEPENDENCE: ICD-10-CM

## 2023-05-15 LAB
1OH-MIDAZOLAM UR QL SCN: NOT DETECTED NG/MG CREAT
6MAM UR QL SCN: NEGATIVE NG/ML
7AMINOCLONAZEPAM UR-MCNC: NOT DETECTED NG/MG CREAT
A-OH ALPRAZ/CREAT UR: NOT DETECTED NG/MG CREAT
A-OH-TRIAZOLAM/CREAT UR CFM: NOT DETECTED NG/MG CREAT
ALPRAZ/CREAT UR CFM: NOT DETECTED NG/MG CREAT
AMPHET UR CFM-MCNC: 3708 NG/MG CREAT
AMPHETAMINES UR QL SCN: NORMAL NG/ML
AMPHETAMINES UR QL: NORMAL
BARBITURATES UR QL SCN: NEGATIVE NG/ML
BENZODIAZ SCN METH UR: NEGATIVE
BUPRENORPHINE UR QL SCN: NEGATIVE
BUPRENORPHINE/CREAT UR: NOT DETECTED NG/MG CREAT
CLONAZEPAM CTO UR SCN-MCNC: NOT DETECTED NG/MG CREAT
COCAINE+BZE UR QL SCN: NEGATIVE NG/ML
CREAT UR-MCNC: 40 MG/DL
DESALKYLFLURAZ/CREAT UR: NOT DETECTED NG/MG CREAT
DIAZEPAM/CREAT UR: NOT DETECTED NG/MG CREAT
ETHANOL UR QL SCN: NEGATIVE G/DL
FENTANYL CTO UR SCN-MCNC: NEGATIVE NG/ML
FENTANYL/CREAT UR: NOT DETECTED NG/MG CREAT
FLUNITRAZEPAM UR QL SCN: NOT DETECTED NG/MG CREAT
LORAZEPAM/CREAT UR: NOT DETECTED NG/MG CREAT
MDA UR CFM-MCNC: NOT DETECTED NG/MG CREAT
MDMA UR CFM-MCNC: NOT DETECTED NG/MG CREAT
METHADONE UR QL SCN: NEGATIVE NG/ML
METHADONE+METAB UR QL SCN: NEGATIVE NG/ML
METHAMPHET UR CFM-MCNC: NOT DETECTED NG/MG CREAT
MIDAZOLAM/CREAT UR CFM: NOT DETECTED NG/MG CREAT
NORBUPRENORPHINE/CREAT UR: NOT DETECTED NG/MG CREAT
NORDIAZEPAM/CREAT UR: NOT DETECTED NG/MG CREAT
NORFENTANYL/CREAT UR: NOT DETECTED NG/MG CREAT
NORFLUNITRAZEPAM UR-MCNC: NOT DETECTED NG/MG CREAT
OPIATES UR SCN-MCNC: NEGATIVE NG/ML
OXAZEPAM/CREAT UR: NOT DETECTED NG/MG CREAT
OXYCODONE CTO UR SCN-MCNC: NEGATIVE NG/ML
PCP UR QL SCN: NEGATIVE NG/ML
PRESCRIBED MEDICATIONS: NORMAL
PRESCRIBED MEDICATIONS: NORMAL
TAPENTADOL CTO UR SCN-MCNC: NEGATIVE NG/ML
TEMAZEPAM/CREAT UR: NOT DETECTED NG/MG CREAT
TRAMADOL UR QL SCN: NEGATIVE NG/ML

## 2023-05-23 ENCOUNTER — PRIOR AUTHORIZATION (OUTPATIENT)
Dept: PSYCHIATRY | Facility: CLINIC | Age: 58
End: 2023-05-23
Payer: COMMERCIAL

## 2023-05-23 DIAGNOSIS — F90.2 ATTENTION DEFICIT HYPERACTIVITY DISORDER, COMBINED TYPE: ICD-10-CM

## 2023-05-23 RX ORDER — DEXTROAMPHETAMINE SACCHARATE, AMPHETAMINE ASPARTATE MONOHYDRATE, DEXTROAMPHETAMINE SULFATE AND AMPHETAMINE SULFATE 5; 5; 5; 5 MG/1; MG/1; MG/1; MG/1
20 CAPSULE, EXTENDED RELEASE ORAL EVERY MORNING
Qty: 30 CAPSULE | Refills: 0 | Status: SHIPPED | OUTPATIENT
Start: 2023-05-23 | End: 2023-05-24 | Stop reason: SDUPTHER

## 2023-05-24 ENCOUNTER — PRIOR AUTHORIZATION (OUTPATIENT)
Dept: PSYCHIATRY | Facility: CLINIC | Age: 58
End: 2023-05-24
Payer: COMMERCIAL

## 2023-05-24 ENCOUNTER — TELEMEDICINE (OUTPATIENT)
Dept: PSYCHIATRY | Facility: CLINIC | Age: 58
End: 2023-05-24
Payer: COMMERCIAL

## 2023-05-24 DIAGNOSIS — F90.2 ATTENTION DEFICIT HYPERACTIVITY DISORDER, COMBINED TYPE: ICD-10-CM

## 2023-05-24 PROCEDURE — 99213 OFFICE O/P EST LOW 20 MIN: CPT | Performed by: NURSE PRACTITIONER

## 2023-05-24 RX ORDER — DEXTROAMPHETAMINE SACCHARATE, AMPHETAMINE ASPARTATE MONOHYDRATE, DEXTROAMPHETAMINE SULFATE AND AMPHETAMINE SULFATE 5; 5; 5; 5 MG/1; MG/1; MG/1; MG/1
20 CAPSULE, EXTENDED RELEASE ORAL EVERY MORNING
Qty: 30 CAPSULE | Refills: 0 | Status: SHIPPED | OUTPATIENT
Start: 2023-05-24

## 2023-05-24 NOTE — LETTER
May 24, 2023    Josefa Nazario  [address] [place] [address]        Pa was denied not sure why it can be generic for insurance see list of what she has failed and tired. If still doesn't go through please let me and the patient know. Thank you!                        EVELINA Peace

## 2023-05-24 NOTE — PROGRESS NOTES
This provider is located at [facility], [address] [place] [place] [address].The Patient is seen remotely at home, [address] [place] [address] via [employer]. Patient is being seen via telehealth and confirm that they are in a secure environment for this session. The patient's condition being diagnosed/treated is appropriate for telemedicine. The provider identified himself/herself: herself as well as her credentials.   The patient gave consent to be seen remotely, and when consent is given they understand that the consent allows for patient identifiable information to be sent to a third party as needed.   They may refuse to be seen remotely at any time. The electronic data is encrypted and password protected, and the patient has been advised of the potential risks to privacy not withstanding such measures.    You have chosen to receive care through a telehealth visit.  Do you consent to use a video/audio connection for your medical care today? Yes. Patient verified Name, , and address.       Chief Complaint  ADHD management     Subjective    Josefa NGUYỄN Mansi presents to [facility] for medication management.     History of Present Illness   Patient presents today reporting that she is doing well.  She states her focus and attention has been good with no major concerns.  Patient denies having any side effects to the medication as she states she is still getting the generic form.  She reports that most of just been the first dose but she has not had any issues or concerns.  Patient denies any major depressive or anxiety symptoms.  Reports that she is sleeping and eating well.  Denies any new medical concerns or issues.  She states her primary care is not concerned with her TSH levels at this time.  Patient states overall things are going well and denies any concerns or questions.  Denies any SI/HI/AH/VH.      Without the medication patient has  problems with attention and focus including easily distracted, easily losing objects, trouble with time management, trouble completing tasks because of distractions, procrastination, indecisiveness, spends lots of extra time compensating for trouble with inattention/focus by making multiple to-do lists, careless mistakes at work, not finishing jobs that are started.  Patient denies any side effects, no worsening of insomnia, no worsening of anxiety on the medication dose.       Objective   Vital Signs:   There were no vitals taken for this visit.  Due to the remote nature of this encounter (virtual encounter), vitals were unable to be obtained.  Height stated at 62 inches.  Weight stated at 173 pounds.      PHQ-9 Score:   PHQ-9 Total Score:       Patient screened  for depression based on a PHQ-9 score of  on . Follow-up recommendations include: see notes and medication list.    PHQ-9 Total Score:    PHQ-9 Depression Screening  Little interest or pleasure in doing things?     Feeling down, depressed, or hopeless?     Trouble falling or staying asleep, or sleeping too much?     Feeling tired or having little energy?     Poor appetite or overeating?     Feeling bad about yourself - or that you are a failure or have let yourself or your family down?     Trouble concentrating on things, such as reading the newspaper or watching television?     Moving or speaking so slowly that other people could have noticed? Or the opposite - being so fidgety or restless that you have been moving around a lot more than usual?     Thoughts that you would be better off dead, or of hurting yourself in some way?     PHQ-9 Total Score     If you checked off any problems, how difficult have these problems made it for you to do your work, take care of things at home, or get along with other people?                  PROMIS scale screening tool that patient filled out virtually reviewed by this APRN at today's encounter.      Mental Status Exam:    Hygiene:   good  Cooperation:  Cooperative  Eye Contact:  Good  Psychomotor Behavior:  Appropriate  Affect:  Appropriate  Mood: normal  Speech:  Normal  Thought Process:  Goal directed  Thought Content:  Normal  Suicidal:  None  Homicidal:  None  Hallucinations:  None  Delusion:  None  Memory:  Intact  Orientation:  Person, Place, Time and Situation  Reliability:  good  Insight:  Good  Judgement:  Good and Fair  Impulse Control:  Good  Physical/Medical Issues:  Yes Hypothyroidism     Current Medications:   Current Outpatient Medications   Medication Sig Dispense Refill   • amphetamine-dextroamphetamine XR (Adderall XR) 20 MG 24 hr capsule Take 1 capsule by mouth Every Morning 30 capsule 0   • estradiol (ESTRACE) 0.5 MG tablet      • Progesterone (PROMETRIUM) 100 MG capsule      • Synthroid 137 MCG tablet Take 1 tablet by mouth Daily. 90 tablet 2   • traZODone (DESYREL) 50 MG tablet TAKE ONE TABLET BY MOUTH ONCE NIGHTLY 30 tablet 3     No current facility-administered medications for this visit.       Physical Exam  Nursing note reviewed.   Constitutional:       Appearance: Normal appearance.   Neurological:      Mental Status: She is alert.   Psychiatric:         Attention and Perception: Attention and perception normal.         Mood and Affect: Mood and affect normal.         Speech: Speech normal.         Behavior: Behavior is cooperative.         Thought Content: Thought content normal.         Cognition and Memory: Cognition and memory normal.         Judgment: Judgment normal.        Result Review :     The following data was reviewed by: EVELINA Peace on 08/18/2022:  Common labs        10/11/2022    09:35 3/13/2023    11:30   Common Labs   Glucose 98   95     BUN 11   12     Creatinine 0.64   0.76     Sodium 138   143     Potassium 4.4   4.5     Chloride 104   103     Calcium 9.1   9.3     Total Protein 6.9   6.8     Albumin 4.50   4.2     Total Bilirubin 0.3   0.5     Alkaline Phosphatase 66   87    "  AST (SGOT) 21   24     ALT (SGPT) 18   19     Total Cholesterol 241   227     Triglycerides 137   168     HDL Cholesterol 51   57     LDL Cholesterol  165   140       Data reviewed: PCP notes        Assessment and Plan    Problem List Items Addressed This Visit    None  Visit Diagnoses     Attention deficit hyperactivity disorder, combined type        Relevant Medications    amphetamine-dextroamphetamine XR (Adderall XR) 20 MG 24 hr capsule            TREATMENT PLAN/GOALS: Continue supportive psychotherapy efforts and medications as indicated. Treatment and medication options discussed during today's visit. Patient ackowledged and verbally consented to continue with current treatment plan and was educated on the importance of compliance with treatment and follow-up appointments.    MEDICATION ISSUES:  We discussed risks, benefits, and side effects of the above medications and the patient was agreeable with the plan. Patient was educated on the importance of compliance with treatment and follow-up appointments.  Patient is agreeable to call the office with any worsening of symptoms or onset of side effects. Patient is agreeable to call 911 or go to the nearest ER should he/she begin having SI/HI.      Controlled Substance Medication Contract    Controlled substance medications (i.e. benzodiazepines, opioids, amphetamines) are very useful, but have a high potential for misuse and are, therefore, closely controlled by local, state, and federal government(s). As a patient of [facility], you agree and understand the followin. I am responsible for the controlled substance medications prescribed to me. If my prescription is misplaced, stolen, or if \"I run out early,\" I understand this medication will not be replaced regardless of the circumstances.  2. Refills of controlled substance medications: (a) Will be made only during regular office hours Monday-Thursday once a month and during " "a scheduled virtual appointment. Refills will not be made at night, weekends, or on holidays. (b) Will not be made if \"I lost my prescriptions,\" \"ran out early,\" or \"misplaced my medication\". I am solely responsible for taking the medication as prescribed and for keeping track of the remaining.   3. I agree to comply with urine drug testing and pill counts at the provider's discretion, thereby, documenting the proper use of any medications. If alcohol abuse is suspected, a breathalyzer or blood alcohol level may be ordered. Unannounced urine or serum toxicology specimens may be requested and my cooperation is required.  4. I understand that if I violate any of the above conditions, my prescriptions for controlled medications my be terminated. If the violation involves obtaining these medications from another individual, or the concomitant use of non-prescription illicit (illegal) drugs, I may also be reported to other providers, pharmacies, medical facilities and the appropriate authorities.   5. I further understand that if I violate this controlled substance contract due to non-compliance of medical directions, such as the failure in taking medications as prescribed, utilizing other illicit drugs, or abuse of controlled medications, the prescription for controlled medications may be terminated.   6. I agree to keep my scheduled appointments and conduct myself in a courteous manner.  7. I agree not to sell, share, or give any medication to another person. I understand that such mishandling of my medication is a serious violation of this agreement and would result in my treatment being terminated without any recourse for appeal.   8. I agree not to take my medication from any physicians, nurse practitioners, pharmacies or other sources without telling my prescriber.  9. I agree to take my medication as my prescriber has instructed and not to alter the way I take my medication without first consulting my " prescriber.  10. I agree to abstain from problematic alcohol usage, opioids, marijuana, cocaine, and other addictive substances.   11. If I am legally involved related to legal or illegal drugs, including alcohol, refill of controlled substances will not be given until a re-evaluation of my chemical dependency treatment plan has been completed. Refills are at the discretion of the prescriber.   12. I agree to fill all of my controlled medications at an in-state ([place]) pharmacy.   13. I understand that [facility] utilizes the [place] All Schedule Prescription Electronic Reporting (VIOLET) system and will monitor my prescription history via this source.  14. Benzodiazepines are drugs prescribed to treat conditions like anxiety, insomnia, and seizures. Examples of these drugs include: alprazolam, clonazepam, diazepam, and lorazepam. The FDA has applied a Black Box Warning (one of the strictest warnings) that the use of opioids and benzodiazepines together have serious risks to include unusual dizziness or lightheadedness, extreme sleepiness, slowed or difficult breathing, coma and death. There is an added risk if alcohol is also ingested. It is the policy of [facility] to NOT prescribe benzodiazepines to patients who also use opioids. If a patient already presents already prescribed both, the prescriber my direct the patient to their previous provider who prescribed it or taper the benzodiazepine as part of the treatment plan. These patients must be monitored at appropriate intervals and so visits may be more frequent.     This APRN has discussed and reviewed this information with the patient and/or guardian. The patient and/or guardian verbally agreed (no signatures are obtained during today's visit as they are a telehealth patient and is unable to print and sign this document, therefore, verbal agreement has been obtained).     -Continue Adderall XR 20  daily for ADHD symptoms.  Patient has no longer noticed any side effects to the generic.  She has failed and tried methylphenidate as well as amphetamine-dextroamphetamine sustained release.  -Continue 50 mg of sertraline for anxiety symptoms.  As patient's PCP is writing medication for her.   -Reviewed UDS. Encouraged patient to still obtain EKG.       Counseled patient regarding multimodal approach with healthy nutrition, healthy sleep, regular physical activity, social activities, counseling, and medications.      Coping skills reviewed and encouraged positive framing of thoughts     Assisted patient in processing above session content; acknowledged and normalized patient’s thoughts, feelings, and concerns.  Applied  positive coping skills and behavior management in session.  Allowed patient to freely discuss issues without interruption or judgment. Provided safe, confidential environment to facilitate the development of positive therapeutic relationship and encourage open, honest communication. Assisted patient in identifying risk factors which would indicate the need for higher level of care including thoughts to harm self or others and/or self-harming behavior and encouraged patient to contact this office, call 911, or present to the nearest emergency room should any of these events occur. Discussed crisis intervention services and means to access.     MEDS ORDERED DURING VISIT:  New Medications Ordered This Visit   Medications   • amphetamine-dextroamphetamine XR (Adderall XR) 20 MG 24 hr capsule     Sig: Take 1 capsule by mouth Every Morning     Dispense:  30 capsule     Refill:  0     Generic for insurance           Follow Up   Return in about 3 months (around 8/24/2023), or if symptoms worsen or fail to improve, for Recheck.  Encouraged patient to contact the clinic if sooner appointment needed or concerns she verbalized understanding.    Patient was given instructions and counseling regarding her  condition or for health maintenance advice. Please see specific information pulled into the AVS if appropriate.     Some of the data in this electronic note has been brought forward from a previous encounter, any necessary changes have been made, it has been reviewed by this APRN, and it is accurate.        This document has been electronically signed by EVELINA Peace  May 24, 2023 11:44 EDT    Part of this note may be an electronic transcription/translation of spoken language to printed text using the Dragon Dictation System.

## 2023-05-24 NOTE — TELEPHONE ENCOUNTER
PA denial states as follows: Why your request was denied:  *Non-Formulary- Your request for coverage was denied because your prescription benefit plan does  not cover the requested medication.  This decision relates specifically to coverage provided under your prescription benefit plan and does  not involve any determination of medical judgment.  We reviewed all the supporting information sent to us and used your plan’s guidelines to make our  decision. If you’d like us to send you a free copy of the guidelines (requirements, criteria, or  protocols) we used, call the number on your prescription ID card. For more information about your  prescription benefit, please refer to the prescription benefit section in your benefit plan materials

## 2023-06-06 DIAGNOSIS — G47.00 INSOMNIA, UNSPECIFIED TYPE: ICD-10-CM

## 2023-06-06 RX ORDER — TRAZODONE HYDROCHLORIDE 50 MG/1
TABLET ORAL
Qty: 30 TABLET | Refills: 3 | Status: SHIPPED | OUTPATIENT
Start: 2023-06-06

## 2023-06-16 RX ORDER — LEVOTHYROXINE SODIUM 137 MCG
TABLET ORAL
Qty: 90 TABLET | Refills: 2 | OUTPATIENT
Start: 2023-06-16

## 2023-08-23 ENCOUNTER — TELEMEDICINE (OUTPATIENT)
Dept: PSYCHIATRY | Facility: CLINIC | Age: 58
End: 2023-08-23
Payer: COMMERCIAL

## 2023-08-23 DIAGNOSIS — F90.2 ATTENTION DEFICIT HYPERACTIVITY DISORDER, COMBINED TYPE: Primary | ICD-10-CM

## 2023-08-23 PROCEDURE — 99213 OFFICE O/P EST LOW 20 MIN: CPT | Performed by: NURSE PRACTITIONER

## 2023-08-23 RX ORDER — DEXTROAMPHETAMINE SACCHARATE, AMPHETAMINE ASPARTATE MONOHYDRATE, DEXTROAMPHETAMINE SULFATE AND AMPHETAMINE SULFATE 5; 5; 5; 5 MG/1; MG/1; MG/1; MG/1
20 CAPSULE, EXTENDED RELEASE ORAL EVERY MORNING
Qty: 30 CAPSULE | Refills: 0 | Status: SHIPPED | OUTPATIENT
Start: 2023-08-23

## 2023-08-23 NOTE — PROGRESS NOTES
[facility] provider is located [facility], [address] [place] [address].The Patient is seen remotely at home, [address] [facility] [place] [address] via Postcard on the [employer]. Patient is being seen via telehealth and confirm that they are in a secure environment for this session. The patient's condition being diagnosed/treated is appropriate for telemedicine. The provider identified himself/herself: herself as well as her credentials.   The patient gave consent to be seen remotely, and when consent is given they understand that the consent allows for patient identifiable information to be sent to a third party as needed.   They may refuse to be seen remotely at any time. The electronic data is encrypted and password protected, and the patient has been advised of the potential risks to privacy not withstanding such measures.    You have chosen to receive care through a telehealth visit.  Do you consent to use a video/audio connection for your medical care today? Yes. Patient verified Name, , and address.       Chief Complaint  ADHD management     Subjective    Josefa DELMA Mansi presents to [facility] for medication management.     History of Present Illness   Patient presents today reporting that she is doing good.  Denies any depressive symptoms or feeling hopeless or helpless.  Denies any major anxiety symptoms or concerns.  States her focus and attention has been good with no issues at home or work.  Denies any side effects to the medications or any blood pressure or heart rate issues.  Reports that her sleep is the same as well as appetite.  States that she did get some today Gummies for pain and sleep and is hoping that will help as this is a chronic issue.  Patient states that she has had her thyroid medication increased but otherwise denies any new medical or medicine changes.  Denies any side effect medications.  Denies any SI/HI/AH/VH.      Without  the medication patient has problems with attention and focus including easily distracted, easily losing objects, trouble with time management, trouble completing tasks because of distractions, procrastination, indecisiveness, spends lots of extra time compensating for trouble with inattention/focus by making multiple to-do lists, careless mistakes at work, not finishing jobs that are started.  Patient denies any side effects, no worsening of insomnia, no worsening of anxiety on the medication dose.       Objective   Vital Signs:   There were no vitals taken for this visit.  Due to the remote nature of this encounter (virtual encounter), vitals were unable to be obtained.  Height stated at 62 inches.  Weight stated at 173 pounds.      PHQ-9 Score:   PHQ-9 Total Score:       Patient screened  for depression based on a PHQ-9 score of  on . Follow-up recommendations include:  see notes and medication list .    PHQ-9 Total Score:    PHQ-9 Depression Screening  Little interest or pleasure in doing things?     Feeling down, depressed, or hopeless?     Trouble falling or staying asleep, or sleeping too much?     Feeling tired or having little energy?     Poor appetite or overeating?     Feeling bad about yourself - or that you are a failure or have let yourself or your family down?     Trouble concentrating on things, such as reading the newspaper or watching television?     Moving or speaking so slowly that other people could have noticed? Or the opposite - being so fidgety or restless that you have been moving around a lot more than usual?     Thoughts that you would be better off dead, or of hurting yourself in some way?     PHQ-9 Total Score     If you checked off any problems, how difficult have these problems made it for you to do your work, take care of things at home, or get along with other people?                  PROMIS scale screening tool that patient filled out virtually reviewed by this APRN at today's  encounter.      Mental Status Exam:   Hygiene:   good  Cooperation:  Cooperative  Eye Contact:  Good  Psychomotor Behavior:  Appropriate  Affect:  Appropriate  Mood: normal  Speech:  Normal  Thought Process:  Goal directed  Thought Content:  Normal  Suicidal:  None  Homicidal:  None  Hallucinations:  None  Delusion:  None  Memory:  Intact  Orientation:  Person, Place, Time and Situation  Reliability:  good  Insight:  Good  Judgement:  Good and Fair  Impulse Control:  Good  Physical/Medical Issues:  Yes Hypothyroidism      Current Medications:   Current Outpatient Medications   Medication Sig Dispense Refill    amphetamine-dextroamphetamine XR (Adderall XR) 20 MG 24 hr capsule Take 1 capsule by mouth Every Morning 30 capsule 0    estradiol (ESTRACE) 0.5 MG tablet       levothyroxine (Synthroid) 150 MCG tablet Take 1 tablet by mouth Daily. Brand name only 90 tablet 1    methylPREDNISolone (MEDROL) 4 MG dose pack Take as directed on package instructions. 21 tablet 0    Progesterone (PROMETRIUM) 100 MG capsule       traZODone (DESYREL) 50 MG tablet TAKE ONE TABLET BY MOUTH ONCE NIGHTLY 30 tablet 3     No current facility-administered medications for this visit.       Physical Exam  Nursing note reviewed.   Constitutional:       Appearance: Normal appearance.   Neurological:      Mental Status: She is alert.   Psychiatric:         Attention and Perception: Attention and perception normal.         Mood and Affect: Mood and affect normal.         Speech: Speech normal.         Behavior: Behavior is cooperative.         Thought Content: Thought content normal.         Cognition and Memory: Cognition and memory normal.         Judgment: Judgment normal.      Result Review :     The following data was reviewed by: EVELINA Peace on 08/18/2022:  Common labs          10/11/2022    09:35 3/13/2023    11:30   Common Labs   Glucose 98  95    BUN 11  12    Creatinine 0.64  0.76    Sodium 138  143    Potassium 4.4  4.5   "  Chloride 104  103    Calcium 9.1  9.3    Total Protein 6.9  6.8    Albumin 4.50  4.2    Total Bilirubin 0.3  0.5    Alkaline Phosphatase 66  87    AST (SGOT) 21  24    ALT (SGPT) 18  19    Total Cholesterol 241  227    Triglycerides 137  168    HDL Cholesterol 51  57    LDL Cholesterol  165  140      Data reviewed: PCP notes         Assessment and Plan    Problem List Items Addressed This Visit    None  Visit Diagnoses       Attention deficit hyperactivity disorder, combined type    -  Primary    Relevant Medications    amphetamine-dextroamphetamine XR (Adderall XR) 20 MG 24 hr capsule            TREATMENT PLAN/GOALS: Continue supportive psychotherapy efforts and medications as indicated. Treatment and medication options discussed during today's visit. Patient ackowledged and verbally consented to continue with current treatment plan and was educated on the importance of compliance with treatment and follow-up appointments.    MEDICATION ISSUES:  We discussed risks, benefits, and side effects of the above medications and the patient was agreeable with the plan. Patient was educated on the importance of compliance with treatment and follow-up appointments.  Patient is agreeable to call the office with any worsening of symptoms or onset of side effects. Patient is agreeable to call 911 or go to the nearest ER should he/she begin having SI/HI.      Controlled Substance Medication Contract    Controlled substance medications (i.e. benzodiazepines, opioids, amphetamines) are very useful, but have a high potential for misuse and are, therefore, closely controlled by local, state, and federal government(s). As a patient of [facility], you agree and understand the followin. I am responsible for the controlled substance medications prescribed to me. If my prescription is misplaced, stolen, or if \"I run out early,\" I understand this medication will not be replaced regardless of the " "circumstances.  2. Refills of controlled substance medications: (a) Will be made only during regular office hours Monday-Thursday once a month and during a scheduled virtual appointment. Refills will not be made at night, weekends, or on holidays. (b) Will not be made if \"I lost my prescriptions,\" \"ran out early,\" or \"misplaced my medication\". I am solely responsible for taking the medication as prescribed and for keeping track of the remaining.   3. I agree to comply with urine drug testing and pill counts at the provider's discretion, thereby, documenting the proper use of any medications. If alcohol abuse is suspected, a breathalyzer or blood alcohol level may be ordered. Unannounced urine or serum toxicology specimens may be requested and my cooperation is required.  4. I understand that if I violate any of the above conditions, my prescriptions for controlled medications my be terminated. If the violation involves obtaining these medications from another individual, or the concomitant use of non-prescription illicit (illegal) drugs, I may also be reported to other providers, pharmacies, medical facilities and the appropriate authorities.   5. I further understand that if I violate this controlled substance contract due to non-compliance of medical directions, such as the failure in taking medications as prescribed, utilizing other illicit drugs, or abuse of controlled medications, the prescription for controlled medications may be terminated.   6. I agree to keep my scheduled appointments and conduct myself in a courteous manner.  7. I agree not to sell, share, or give any medication to another person. I understand that such mishandling of my medication is a serious violation of this agreement and would result in my treatment being terminated without any recourse for appeal.   8. I agree not to take my medication from any physicians, nurse practitioners, pharmacies or other sources without telling my " prescriber.  9. I agree to take my medication as my prescriber has instructed and not to alter the way I take my medication without first consulting my prescriber.  10. I agree to abstain from problematic alcohol usage, opioids, marijuana, cocaine, and other addictive substances.   11. If I am legally involved related to legal or illegal drugs, including alcohol, refill of controlled substances will not be given until a re-evaluation of my chemical dependency treatment plan has been completed. Refills are at the discretion of the prescriber.   12. I agree to fill all of my controlled medications at an in-state ([place]) pharmacy.   13. I understand that [facility] utilizes the [place] All Schedule Prescription Electronic Reporting (hoccer) system and will monitor my prescription history via this source.  14. Benzodiazepines are drugs prescribed to treat conditions like anxiety, insomnia, and seizures. Examples of these drugs include: alprazolam, clonazepam, diazepam, and lorazepam. The FDA has applied a Black Box Warning (one of the strictest warnings) that the use of opioids and benzodiazepines together have serious risks to include unusual dizziness or lightheadedness, extreme sleepiness, slowed or difficult breathing, coma and death. There is an added risk if alcohol is also ingested. It is the policy of [facility] to NOT prescribe benzodiazepines to patients who also use opioids. If a patient already presents already prescribed both, the prescriber my direct the patient to their previous provider who prescribed it or taper the benzodiazepine as part of the treatment plan. These patients must be monitored at appropriate intervals and so visits may be more frequent.     This APRN has discussed and reviewed this information with the patient and/or guardian. The patient and/or guardian verbally agreed (no signatures are obtained during today's visit as  they are a telehealth patient and is unable to print and sign this document, therefore, verbal agreement has been obtained).     -Continue Adderall XR 20 daily for ADHD symptoms.  Patient has no longer noticed any side effects to the generic.  She has failed and tried methylphenidate as well as amphetamine-dextroamphetamine sustained release.  -Continue 50 mg of sertraline for anxiety symptoms.  As patient's PCP is writing medication for her.         Counseled patient regarding multimodal approach with healthy nutrition, healthy sleep, regular physical activity, social activities, counseling, and medications.      Coping skills reviewed and encouraged positive framing of thoughts     Assisted patient in processing above session content; acknowledged and normalized patient's thoughts, feelings, and concerns.  Applied  positive coping skills and behavior management in session.  Allowed patient to freely discuss issues without interruption or judgment. Provided safe, confidential environment to facilitate the development of positive therapeutic relationship and encourage open, honest communication. Assisted patient in identifying risk factors which would indicate the need for higher level of care including thoughts to harm self or others and/or self-harming behavior and encouraged patient to contact this office, call 911, or present to the nearest emergency room should any of these events occur. Discussed crisis intervention services and means to access.     MEDS ORDERED DURING VISIT:  New Medications Ordered This Visit   Medications    amphetamine-dextroamphetamine XR (Adderall XR) 20 MG 24 hr capsule     Sig: Take 1 capsule by mouth Every Morning     Dispense:  30 capsule     Refill:  0     Generic for insurance           Follow Up   Return in about 3 months (around 11/23/2023), or if symptoms worsen or fail to improve, for Recheck.  Encouraged patient to contact the clinic if sooner appointment needed or concerns  she verbalized understanding.    Patient was given instructions and counseling regarding her condition or for health maintenance advice. Please see specific information pulled into the AVS if appropriate.     Some of the data in this electronic note has been brought forward from a previous encounter, any necessary changes have been made, it has been reviewed by this APRN, and it is accurate.        This document has been electronically signed by EVELINA Peace  August 23, 2023 11:42 EDT    Part of this note may be an electronic transcription/translation of spoken language to printed text using the Dragon Dictation System.

## 2023-10-05 DIAGNOSIS — G47.00 INSOMNIA, UNSPECIFIED TYPE: ICD-10-CM

## 2023-10-05 RX ORDER — TRAZODONE HYDROCHLORIDE 50 MG/1
TABLET ORAL
Qty: 30 TABLET | Refills: 3 | Status: SHIPPED | OUTPATIENT
Start: 2023-10-05

## 2023-10-23 DIAGNOSIS — F90.2 ATTENTION DEFICIT HYPERACTIVITY DISORDER, COMBINED TYPE: ICD-10-CM

## 2023-10-23 RX ORDER — DEXTROAMPHETAMINE SACCHARATE, AMPHETAMINE ASPARTATE MONOHYDRATE, DEXTROAMPHETAMINE SULFATE AND AMPHETAMINE SULFATE 5; 5; 5; 5 MG/1; MG/1; MG/1; MG/1
20 CAPSULE, EXTENDED RELEASE ORAL EVERY MORNING
Qty: 30 CAPSULE | Refills: 0 | Status: SHIPPED | OUTPATIENT
Start: 2023-10-23

## 2023-12-06 ENCOUNTER — TELEMEDICINE (OUTPATIENT)
Dept: PSYCHIATRY | Facility: CLINIC | Age: 58
End: 2023-12-06
Payer: COMMERCIAL

## 2023-12-06 DIAGNOSIS — F90.2 ATTENTION DEFICIT HYPERACTIVITY DISORDER, COMBINED TYPE: Primary | ICD-10-CM

## 2023-12-06 DIAGNOSIS — F41.1 GENERALIZED ANXIETY DISORDER: ICD-10-CM

## 2023-12-06 PROCEDURE — 99213 OFFICE O/P EST LOW 20 MIN: CPT | Performed by: NURSE PRACTITIONER

## 2023-12-06 RX ORDER — DEXTROAMPHETAMINE SACCHARATE, AMPHETAMINE ASPARTATE MONOHYDRATE, DEXTROAMPHETAMINE SULFATE AND AMPHETAMINE SULFATE 5; 5; 5; 5 MG/1; MG/1; MG/1; MG/1
20 CAPSULE, EXTENDED RELEASE ORAL EVERY MORNING
Qty: 30 CAPSULE | Refills: 0 | Status: SHIPPED | OUTPATIENT
Start: 2023-12-06

## 2023-12-06 NOTE — PROGRESS NOTES
Provider e-prescribed prescription to the pharmacy.    This [facility] located [facility], [address] [place] [address].The Patient is seen remotely at home, [address] [place] [address] via [employer]. Patient is being seen via telehealth and confirm that they are in a secure environment for this session. The patient's condition being diagnosed/treated is appropriate for telemedicine. The provider identified himself/herself: herself as well as her credentials.   The patient gave consent to be seen remotely, and when consent is given they understand that the consent allows for patient identifiable information to be sent to a third party as needed.   They may refuse to be seen remotely at any time. The electronic data is encrypted and password protected, and the patient has been advised of the potential risks to privacy not withstanding such measures.    You have chosen to receive care through a telehealth visit.  Do you consent to use a video/audio connection for your medical care today? Yes. Patient verified Name, , and address.       Chief Complaint  ADHD management     Subjective    Josefa Nazario presents to [facility] for medication management.     History of Present Illness   Patient presents today reporting that she is doing well.  Denies any major depression or anxiety symptoms.  See PHQ-9 and EMELYN-7.  Patient reports her appetite is good denies any issues with blood pressure or heart rate.  States her sleep is still the same as it varies but otherwise doing well.  States the only new changes for her are that she is going for hand surgery to remove a cyst on the 12th otherwise no medical or medicine changes except for pain meds.  Patient denies any side effects to medications.  Denies any SI/HI/AH/VH.     Without the medication patient has problems with attention and focus including easily distracted, easily losing objects, trouble with time management, trouble completing  tasks because of distractions, procrastination, indecisiveness, spends lots of extra time compensating for trouble with inattention/focus by making multiple to-do lists, careless mistakes at work, not finishing jobs that are started.  Patient denies any side effects, no worsening of insomnia, no worsening of anxiety on the medication dose.       Objective   Vital Signs:   There were no vitals taken for this visit.  Due to the remote nature of this encounter (virtual encounter), vitals were unable to be obtained.  Height stated at 62 inches.  Weight stated at 159 pounds.      PHQ-9 Score:   PHQ-9 Total Score:       Patient screened  for depression based on a PHQ-9 score of  on . Follow-up recommendations include:  see notes and medication list .    PHQ-9 Total Score: (P) 1  PHQ-9 Depression Screening  Little interest or pleasure in doing things? (P) 0-->not at all   Feeling down, depressed, or hopeless? (P) 0-->not at all   Trouble falling or staying asleep, or sleeping too much? (P) 1-->several days   Feeling tired or having little energy? (P) 0-->not at all   Poor appetite or overeating? (P) 0-->not at all   Feeling bad about yourself - or that you are a failure or have let yourself or your family down? (P) 0-->not at all   Trouble concentrating on things, such as reading the newspaper or watching television? (P) 0-->not at all   Moving or speaking so slowly that other people could have noticed? Or the opposite - being so fidgety or restless that you have been moving around a lot more than usual? (P) 0-->not at all   Thoughts that you would be better off dead, or of hurting yourself in some way? (P) 0-->not at all   PHQ-9 Total Score (P) 1   If you checked off any problems, how difficult have these problems made it for you to do your work, take care of things at home, or get along with other people? (P) not difficult at all         Feeling nervous, anxious or on edge: (P) Not at all  Not being able to stop or  control worrying: (P) Not at all  Worrying too much about different things: (P) Not at all  Trouble Relaxing: (P) Not at all  Being so restless that it is hard to sit still: (P) Not at all  Feeling afraid as if something awful might happen: (P) Not at all  Becoming easily annoyed or irritable: (P) Several days  EMELYN 7 Total Score: (P) 1  If you checked any problems, how difficult have these problems made it for you to do your work, take care of things at home, or get along with other people: (P) Not difficult at all      PROMIS scale screening tool that patient filled out virtually reviewed by this APRN at today's encounter.      Mental Status Exam:   Hygiene:   good  Cooperation:  Cooperative  Eye Contact:  Good  Psychomotor Behavior:  Appropriate  Affect:  Appropriate  Mood: normal  Speech:  Normal  Thought Process:  Goal directed  Thought Content:  Normal  Suicidal:  None  Homicidal:  None  Hallucinations:  None  Delusion:  None  Memory:  Intact  Orientation:  Person, Place, Time and Situation  Reliability:  good  Insight:  Good  Judgement:  Good and Fair  Impulse Control:  Good  Physical/Medical Issues:  Yes Hypothyroidism      Current Medications:   Current Outpatient Medications   Medication Sig Dispense Refill    amphetamine-dextroamphetamine XR (Adderall XR) 20 MG 24 hr capsule Take 1 capsule by mouth Every Morning 30 capsule 0    estradiol (ESTRACE) 0.5 MG tablet       levothyroxine (Synthroid) 150 MCG tablet Take 1 tablet by mouth Daily. Brand name only 90 tablet 1    methylPREDNISolone (MEDROL) 4 MG dose pack Take as directed on package instructions. 21 tablet 0    Progesterone (PROMETRIUM) 100 MG capsule       traZODone (DESYREL) 50 MG tablet TAKE ONE TABLET BY MOUTH ONCE NIGHTLY 30 tablet 3     No current facility-administered medications for this visit.       Physical Exam  Nursing note reviewed.   Constitutional:       Appearance: Normal appearance.   Neurological:      Mental Status: She is alert.    Psychiatric:         Attention and Perception: Attention and perception normal.         Mood and Affect: Mood and affect normal.         Speech: Speech normal.         Behavior: Behavior is cooperative.         Thought Content: Thought content normal.         Cognition and Memory: Cognition and memory normal.         Judgment: Judgment normal.        Result Review :     The following data was reviewed by: EVELINA Peace on 08/18/2022:  Common labs          3/13/2023    11:30   Common Labs   Glucose 95    BUN 12    Creatinine 0.76    Sodium 143    Potassium 4.5    Chloride 103    Calcium 9.3    Total Protein 6.8    Albumin 4.2    Total Bilirubin 0.5    Alkaline Phosphatase 87    AST (SGOT) 24    ALT (SGPT) 19    Total Cholesterol 227    Triglycerides 168    HDL Cholesterol 57    LDL Cholesterol  140      Data reviewed: PCP notes         Assessment and Plan    Problem List Items Addressed This Visit    None  Visit Diagnoses       Attention deficit hyperactivity disorder, combined type    -  Primary    Relevant Medications    amphetamine-dextroamphetamine XR (Adderall XR) 20 MG 24 hr capsule    Generalized anxiety disorder        Relevant Medications    amphetamine-dextroamphetamine XR (Adderall XR) 20 MG 24 hr capsule              TREATMENT PLAN/GOALS: Continue supportive psychotherapy efforts and medications as indicated. Treatment and medication options discussed during today's visit. Patient ackowledged and verbally consented to continue with current treatment plan and was educated on the importance of compliance with treatment and follow-up appointments.    MEDICATION ISSUES:  We discussed risks, benefits, and side effects of the above medications and the patient was agreeable with the plan. Patient was educated on the importance of compliance with treatment and follow-up appointments.  Patient is agreeable to call the office with any worsening of symptoms or onset of side effects. Patient is agreeable  "to call 911 or go to the nearest ER should he/she begin having SI/HI.      Controlled Substance Medication Contract    Controlled substance medications (i.e. benzodiazepines, opioids, amphetamines) are very useful, but have a high potential for misuse and are, therefore, closely controlled by local, state, and federal government(s). As a patient of [facility], you agree and understand the followin. I am responsible for the controlled substance medications prescribed to me. If my prescription is misplaced, stolen, or if \"I run out early,\" I understand this medication will not be replaced regardless of the circumstances.  2. Refills of controlled substance medications: (a) Will be made only during regular office hours Monday-Thursday once a month and during a scheduled virtual appointment. Refills will not be made at night, weekends, or on holidays. (b) Will not be made if \"I lost my prescriptions,\" \"ran out early,\" or \"misplaced my medication\". I am solely responsible for taking the medication as prescribed and for keeping track of the remaining.   3. I agree to comply with urine drug testing and pill counts at the provider's discretion, thereby, documenting the proper use of any medications. If alcohol abuse is suspected, a breathalyzer or blood alcohol level may be ordered. Unannounced urine or serum toxicology specimens may be requested and my cooperation is required.  4. I understand that if I violate any of the above conditions, my prescriptions for controlled medications my be terminated. If the violation involves obtaining these medications from another individual, or the concomitant use of non-prescription illicit (illegal) drugs, I may also be reported to other providers, pharmacies, medical facilities and the appropriate authorities.   5. I further understand that if I violate this controlled substance contract due to non-compliance of medical directions, such as the " failure in taking medications as prescribed, utilizing other illicit drugs, or abuse of controlled medications, the prescription for controlled medications may be terminated.   6. I agree to keep my scheduled appointments and conduct myself in a courteous manner.  7. I agree not to sell, share, or give any medication to another person. I understand that such mishandling of my medication is a serious violation of this agreement and would result in my treatment being terminated without any recourse for appeal.   8. I agree not to take my medication from any physicians, nurse practitioners, pharmacies or other sources without telling my prescriber.  9. I agree to take my medication as my prescriber has instructed and not to alter the way I take my medication without first consulting my prescriber.  10. I agree to abstain from problematic alcohol usage, opioids, marijuana, cocaine, and other addictive substances.   11. If I am legally involved related to legal or illegal drugs, including alcohol, refill of controlled substances will not be given until a re-evaluation of my chemical dependency treatment plan has been completed. Refills are at the discretion of the prescriber.   12. I agree to fill all of my controlled medications at an in-state ([place]) pharmacy.   13. I understand that [facility] utilizes the [place] All Schedule Prescription Electronic Reporting (VIOLET) system and will monitor my prescription history via this source.  14. Benzodiazepines are drugs prescribed to treat conditions like anxiety, insomnia, and seizures. Examples of these drugs include: alprazolam, clonazepam, diazepam, and lorazepam. The FDA has applied a Black Box Warning (one of the strictest warnings) that the use of opioids and benzodiazepines together have serious risks to include unusual dizziness or lightheadedness, extreme sleepiness, slowed or difficult breathing, coma and death. There is an added  risk if alcohol is also ingested. It is the policy of [facility] to NOT prescribe benzodiazepines to patients who also use opioids. If a patient already presents already prescribed both, the prescriber my direct the patient to their previous provider who prescribed it or taper the benzodiazepine as part of the treatment plan. These patients must be monitored at appropriate intervals and so visits may be more frequent.     This APRN has discussed and reviewed this information with the patient and/or guardian. The patient and/or guardian verbally agreed (no signatures are obtained during today's visit as they are a telehealth patient and is unable to print and sign this document, therefore, verbal agreement has been obtained).     -Continue Adderall XR 20 daily for ADHD symptoms.  Patient has no longer noticed any side effects to the generic.  She has failed and tried methylphenidate as well as amphetamine-dextroamphetamine sustained release.  -Continue 50 mg of sertraline for anxiety symptoms.  As patient's PCP is writing medication for her.         Counseled patient regarding multimodal approach with healthy nutrition, healthy sleep, regular physical activity, social activities, counseling, and medications.      Coping skills reviewed and encouraged positive framing of thoughts     Assisted patient in processing above session content; acknowledged and normalized patient’s thoughts, feelings, and concerns.  Applied  positive coping skills and behavior management in session.  Allowed patient to freely discuss issues without interruption or judgment. Provided safe, confidential environment to facilitate the development of positive therapeutic relationship and encourage open, honest communication. Assisted patient in identifying risk factors which would indicate the need for higher level of care including thoughts to harm self or others and/or self-harming behavior and encouraged patient to  contact this office, call 911, or present to the nearest emergency room should any of these events occur. Discussed crisis intervention services and means to access.     MEDS ORDERED DURING VISIT:  New Medications Ordered This Visit   Medications    amphetamine-dextroamphetamine XR (Adderall XR) 20 MG 24 hr capsule     Sig: Take 1 capsule by mouth Every Morning     Dispense:  30 capsule     Refill:  0     Generic for insurance           Follow Up   Return in about 3 months (around 3/6/2024), or if symptoms worsen or fail to improve, for Recheck.  Encouraged patient to contact the clinic if sooner appointment needed or concerns she verbalized understanding.    Patient was given instructions and counseling regarding her condition or for health maintenance advice. Please see specific information pulled into the AVS if appropriate.     Some of the data in this electronic note has been brought forward from a previous encounter, any necessary changes have been made, it has been reviewed by this APRN, and it is accurate.        This document has been electronically signed by EVELINA Peace  December 6, 2023 10:38 EST    Part of this note may be an electronic transcription/translation of spoken language to printed text using the Dragon Dictation System.

## 2024-02-15 DIAGNOSIS — G47.00 INSOMNIA, UNSPECIFIED TYPE: ICD-10-CM

## 2024-02-15 RX ORDER — TRAZODONE HYDROCHLORIDE 50 MG/1
50 TABLET ORAL NIGHTLY
Qty: 30 TABLET | Refills: 3 | OUTPATIENT
Start: 2024-02-15

## 2024-02-16 DIAGNOSIS — G47.00 INSOMNIA, UNSPECIFIED TYPE: ICD-10-CM

## 2024-02-16 RX ORDER — TRAZODONE HYDROCHLORIDE 50 MG/1
50 TABLET ORAL NIGHTLY
Qty: 90 TABLET | Refills: 0 | Status: SHIPPED | OUTPATIENT
Start: 2024-02-16

## 2024-03-06 ENCOUNTER — TELEMEDICINE (OUTPATIENT)
Dept: PSYCHIATRY | Facility: CLINIC | Age: 59
End: 2024-03-06
Payer: COMMERCIAL

## 2024-03-06 DIAGNOSIS — F90.2 ATTENTION DEFICIT HYPERACTIVITY DISORDER, COMBINED TYPE: Primary | ICD-10-CM

## 2024-03-06 DIAGNOSIS — G47.00 INSOMNIA, UNSPECIFIED TYPE: ICD-10-CM

## 2024-03-06 PROCEDURE — 99213 OFFICE O/P EST LOW 20 MIN: CPT | Performed by: NURSE PRACTITIONER

## 2024-03-06 RX ORDER — DEXTROAMPHETAMINE SACCHARATE, AMPHETAMINE ASPARTATE MONOHYDRATE, DEXTROAMPHETAMINE SULFATE AND AMPHETAMINE SULFATE 5; 5; 5; 5 MG/1; MG/1; MG/1; MG/1
20 CAPSULE, EXTENDED RELEASE ORAL EVERY MORNING
Qty: 30 CAPSULE | Refills: 0 | Status: SHIPPED | OUTPATIENT
Start: 2024-03-06

## 2024-03-06 NOTE — PROGRESS NOTES
[facility] provider [facility], [address] [place] [address].The Patient is seen remotely at home, [address] [facility] [place] [address] via [employer]. Patient is being seen via telehealth and confirm that they are in a secure environment for this session. The patient's condition being diagnosed/treated is appropriate for telemedicine. The provider identified himself/herself: herself as well as her credentials.   The patient gave consent to be seen remotely, and when consent is given they understand that the consent allows for patient identifiable information to be sent to a third party as needed.   They may refuse to be seen remotely at any time. The electronic data is encrypted and password protected, and the patient has been advised of the potential risks to privacy not withstanding such measures.    You have chosen to receive care through a telehealth visit.  Do you consent to use a video/audio connection for your medical care today? Yes. Patient verified Name, , and address.       Chief Complaint  ADHD management     Subjective    Josefa Nazario presents to [facility] for medication management.     History of Present Illness   Patient presents today reporting she is doing good.  She states her surgery went well on her hand at the end of December.  Patient denies any depressive symptoms or feeling hopeless or helpless.  Denied any major anxiety symptoms or concerns.  See PHQ-9 and EMELYN-7.  States focus and attention has been good and reports staying on task without any issues.  Denies any side effects to medications.  Reports sleep and appetite are unchanged.  Patient states that she has to find a new primary care provider as hers left and no one would refill her trazodone which is why she called our office and she did not even get a notice that her provider was leaving.  Patient states that she is going to have to find someone  soon so encouraged when she did that she can get a UDS at the office at the same time patient verbalized understanding.  Denied any other concerns or issues.  Denies any SI/HI/AH/VH.    Without the medication patient has problems with attention and focus including easily distracted, easily losing objects, trouble with time management, trouble completing tasks because of distractions, procrastination, indecisiveness, spends lots of extra time compensating for trouble with inattention/focus by making multiple to-do lists, careless mistakes at work, not finishing jobs that are started.  Patient denies any side effects, no worsening of insomnia, no worsening of anxiety on the medication dose.       Objective   Vital Signs:   There were no vitals taken for this visit.  Due to the remote nature of this encounter (virtual encounter), vitals were unable to be obtained.  Height stated at 62 inches.  Weight stated at 159 pounds.      PHQ-9 Score:   PHQ-9 Total Score:       Patient screened  for depression based on a PHQ-9 score of  on . Follow-up recommendations include:  see notes and medication list .    PHQ-9 Total Score: (P) 2  PHQ-9 Depression Screening  Little interest or pleasure in doing things? (P) 0-->not at all   Feeling down, depressed, or hopeless? (P) 0-->not at all   Trouble falling or staying asleep, or sleeping too much? (P) 2-->more than half the days   Feeling tired or having little energy? (P) 0-->not at all   Poor appetite or overeating? (P) 0-->not at all   Feeling bad about yourself - or that you are a failure or have let yourself or your family down? (P) 0-->not at all   Trouble concentrating on things, such as reading the newspaper or watching television? (P) 0-->not at all   Moving or speaking so slowly that other people could have noticed? Or the opposite - being so fidgety or restless that you have been moving around a lot more than usual? (P) 0-->not at all   Thoughts that you would be better off  dead, or of hurting yourself in some way? (P) 0-->not at all   PHQ-9 Total Score (P) 2   If you checked off any problems, how difficult have these problems made it for you to do your work, take care of things at home, or get along with other people? (P) not difficult at all         Feeling nervous, anxious or on edge: (P) Not at all  Not being able to stop or control worrying: (P) Not at all  Worrying too much about different things: (P) Not at all  Trouble Relaxing: (P) Not at all  Being so restless that it is hard to sit still: (P) Not at all  Feeling afraid as if something awful might happen: (P) Not at all  Becoming easily annoyed or irritable: (P) Not at all  EMELYN 7 Total Score: (P) 0  If you checked any problems, how difficult have these problems made it for you to do your work, take care of things at home, or get along with other people: (P) Not difficult at all      PROMIS scale screening tool that patient filled out virtually reviewed by this APRN at today's encounter.      Mental Status Exam:   Hygiene:   good  Cooperation:  Cooperative  Eye Contact:  Good  Psychomotor Behavior:  Appropriate  Affect:  Appropriate  Mood: normal  Speech:  Normal  Thought Process:  Goal directed  Thought Content:  Normal  Suicidal:  None  Homicidal:  None  Hallucinations:  None  Delusion:  None  Memory:  Intact  Orientation:  Person, Place, Time and Situation  Reliability:  good  Insight:  Good  Judgement:  Good and Fair  Impulse Control:  Good  Physical/Medical Issues:  Yes Hypothyroidism      Current Medications:   Current Outpatient Medications   Medication Sig Dispense Refill    amphetamine-dextroamphetamine XR (Adderall XR) 20 MG 24 hr capsule Take 1 capsule by mouth Every Morning 30 capsule 0    estradiol (ESTRACE) 0.5 MG tablet       levothyroxine (Synthroid) 150 MCG tablet Take 1 tablet by mouth Daily. Brand name only 90 tablet 1    methylPREDNISolone (MEDROL) 4 MG dose pack Take as directed on package instructions. 21  tablet 0    Progesterone (PROMETRIUM) 100 MG capsule       traZODone (DESYREL) 50 MG tablet Take 1 tablet by mouth Every Night. 90 tablet 0     No current facility-administered medications for this visit.       Physical Exam  Nursing note reviewed.   Constitutional:       Appearance: Normal appearance.   Neurological:      Mental Status: She is alert.   Psychiatric:         Attention and Perception: Attention and perception normal.         Mood and Affect: Mood and affect normal.         Speech: Speech normal.         Behavior: Behavior is cooperative.         Thought Content: Thought content normal.         Cognition and Memory: Cognition and memory normal.         Judgment: Judgment normal.        Result Review :     The following data was reviewed by: EVELINA Peace on 08/18/2022:  Common labs          3/13/2023    11:30   Common Labs   Glucose 95    BUN 12    Creatinine 0.76    Sodium 143    Potassium 4.5    Chloride 103    Calcium 9.3    Total Protein 6.8    Albumin 4.2    Total Bilirubin 0.5    Alkaline Phosphatase 87    AST (SGOT) 24    ALT (SGPT) 19    Total Cholesterol 227    Triglycerides 168    HDL Cholesterol 57    LDL Cholesterol  140      Data reviewed: PCP notes         Assessment and Plan    Problem List Items Addressed This Visit          Sleep    Insomnia     Other Visit Diagnoses       Attention deficit hyperactivity disorder, combined type    -  Primary    Relevant Medications    amphetamine-dextroamphetamine XR (Adderall XR) 20 MG 24 hr capsule                TREATMENT PLAN/GOALS: Continue supportive psychotherapy efforts and medications as indicated. Treatment and medication options discussed during today's visit. Patient ackowledged and verbally consented to continue with current treatment plan and was educated on the importance of compliance with treatment and follow-up appointments.    MEDICATION ISSUES:  We discussed risks, benefits, and side effects of the above medications and the  "patient was agreeable with the plan. Patient was educated on the importance of compliance with treatment and follow-up appointments.  Patient is agreeable to call the office with any worsening of symptoms or onset of side effects. Patient is agreeable to call 911 or go to the nearest ER should he/she begin having SI/HI.      Controlled Substance Medication Contract    Controlled substance medications (i.e. benzodiazepines, opioids, amphetamines) are very useful, but have a high potential for misuse and are, therefore, closely controlled by local, state, and federal government(s). As a patient of [facility], you agree and understand the followin. I am responsible for the controlled substance medications prescribed to me. If my prescription is misplaced, stolen, or if \"I run out early,\" I understand this medication will not be replaced regardless of the circumstances.  2. Refills of controlled substance medications: (a) Will be made only during regular office hours Monday-Thursday once a month and during a scheduled virtual appointment. Refills will not be made at night, weekends, or on holidays. (b) Will not be made if \"I lost my prescriptions,\" \"ran out early,\" or \"misplaced my medication\". I am solely responsible for taking the medication as prescribed and for keeping track of the remaining.   3. I agree to comply with urine drug testing and pill counts at the provider's discretion, thereby, documenting the proper use of any medications. If alcohol abuse is suspected, a breathalyzer or blood alcohol level may be ordered. Unannounced urine or serum toxicology specimens may be requested and my cooperation is required.  4. I understand that if I violate any of the above conditions, my prescriptions for controlled medications my be terminated. If the violation involves obtaining these medications from another individual, or the concomitant use of non-prescription illicit (illegal) " drugs, I may also be reported to other providers, pharmacies, medical facilities and the appropriate authorities.   5. I further understand that if I violate this controlled substance contract due to non-compliance of medical directions, such as the failure in taking medications as prescribed, utilizing other illicit drugs, or abuse of controlled medications, the prescription for controlled medications may be terminated.   6. I agree to keep my scheduled appointments and conduct myself in a courteous manner.  7. I agree not to sell, share, or give any medication to another person. I understand that such mishandling of my medication is a serious violation of this agreement and would result in my treatment being terminated without any recourse for appeal.   8. I agree not to take my medication from any physicians, nurse practitioners, pharmacies or other sources without telling my prescriber.  9. I agree to take my medication as my prescriber has instructed and not to alter the way I take my medication without first consulting my prescriber.  10. I agree to abstain from problematic alcohol usage, opioids, marijuana, cocaine, and other addictive substances.   11. If I am legally involved related to legal or illegal drugs, including alcohol, refill of controlled substances will not be given until a re-evaluation of my chemical dependency treatment plan has been completed. Refills are at the discretion of the prescriber.   12. I agree to fill all of my controlled medications at an in-state ([place]) pharmacy.   13. I understand that [facility] utilizes the [place] All Schedule Prescription Electronic Reporting (VIOLET) system and will monitor my prescription history via this source.  14. Benzodiazepines are drugs prescribed to treat conditions like anxiety, insomnia, and seizures. Examples of these drugs include: alprazolam, clonazepam, diazepam, and lorazepam. The FDA has applied a Black  Box Warning (one of the strictest warnings) that the use of opioids and benzodiazepines together have serious risks to include unusual dizziness or lightheadedness, extreme sleepiness, slowed or difficult breathing, coma and death. There is an added risk if alcohol is also ingested. It is the policy of [facility] to NOT prescribe benzodiazepines to patients who also use opioids. If a patient already presents already prescribed both, the prescriber my direct the patient to their previous provider who prescribed it or taper the benzodiazepine as part of the treatment plan. These patients must be monitored at appropriate intervals and so visits may be more frequent.     This APRN has discussed and reviewed this information with the patient and/or guardian. The patient and/or guardian verbally agreed (no signatures are obtained during today's visit as they are a telehealth patient and is unable to print and sign this document, therefore, verbal agreement has been obtained).     -Continue Adderall XR 20 daily for ADHD symptoms.  Patient has no longer noticed any side effects to the generic.  She has failed and tried methylphenidate as well as amphetamine-dextroamphetamine sustained release.  -Continue trazodone 50 mg at night for sleep.        Counseled patient regarding multimodal approach with healthy nutrition, healthy sleep, regular physical activity, social activities, counseling, and medications.      Coping skills reviewed and encouraged positive framing of thoughts     Assisted patient in processing above session content; acknowledged and normalized patient’s thoughts, feelings, and concerns.  Applied  positive coping skills and behavior management in session.  Allowed patient to freely discuss issues without interruption or judgment. Provided safe, confidential environment to facilitate the development of positive therapeutic relationship and encourage open, honest communication.  Assisted patient in identifying risk factors which would indicate the need for higher level of care including thoughts to harm self or others and/or self-harming behavior and encouraged patient to contact this office, call 911, or present to the nearest emergency room should any of these events occur. Discussed crisis intervention services and means to access.     MEDS ORDERED DURING VISIT:  New Medications Ordered This Visit   Medications    amphetamine-dextroamphetamine XR (Adderall XR) 20 MG 24 hr capsule     Sig: Take 1 capsule by mouth Every Morning     Dispense:  30 capsule     Refill:  0     Generic for insurance           Follow Up   Return in about 3 months (around 6/6/2024), or if symptoms worsen or fail to improve, for Recheck.  Encouraged patient to contact the clinic if sooner appointment needed or concerns she verbalized understanding.    Patient was given instructions and counseling regarding her condition or for health maintenance advice. Please see specific information pulled into the AVS if appropriate.     Some of the data in this electronic note has been brought forward from a previous encounter, any necessary changes have been made, it has been reviewed by this APRN, and it is accurate.        This document has been electronically signed by EVELINA Peace  March 6, 2024 10:50 EST    Part of this note may be an electronic transcription/translation of spoken language to printed text using the Dragon Dictation System.

## 2024-04-12 DIAGNOSIS — F90.2 ATTENTION DEFICIT HYPERACTIVITY DISORDER, COMBINED TYPE: ICD-10-CM

## 2024-04-15 ENCOUNTER — OFFICE VISIT (OUTPATIENT)
Dept: FAMILY MEDICINE CLINIC | Facility: CLINIC | Age: 59
End: 2024-04-15
Payer: COMMERCIAL

## 2024-04-15 VITALS
TEMPERATURE: 98.2 F | OXYGEN SATURATION: 97 % | BODY MASS INDEX: 30.91 KG/M2 | DIASTOLIC BLOOD PRESSURE: 76 MMHG | HEART RATE: 108 BPM | SYSTOLIC BLOOD PRESSURE: 126 MMHG | WEIGHT: 168 LBS | HEIGHT: 62 IN

## 2024-04-15 DIAGNOSIS — G57.01 PIRIFORMIS SYNDROME OF RIGHT SIDE: ICD-10-CM

## 2024-04-15 DIAGNOSIS — M25.50 ARTHRALGIA, UNSPECIFIED JOINT: ICD-10-CM

## 2024-04-15 DIAGNOSIS — E03.9 HYPOTHYROIDISM (ACQUIRED): ICD-10-CM

## 2024-04-15 DIAGNOSIS — G47.00 INSOMNIA, UNSPECIFIED TYPE: ICD-10-CM

## 2024-04-15 DIAGNOSIS — Z13.0 SCREENING FOR DEFICIENCY ANEMIA: ICD-10-CM

## 2024-04-15 DIAGNOSIS — M54.31 SCIATICA OF RIGHT SIDE: Primary | ICD-10-CM

## 2024-04-15 DIAGNOSIS — Z13.6 SCREENING FOR ISCHEMIC HEART DISEASE: ICD-10-CM

## 2024-04-15 DIAGNOSIS — F90.9 ATTENTION DEFICIT HYPERACTIVITY DISORDER (ADHD), UNSPECIFIED ADHD TYPE: ICD-10-CM

## 2024-04-15 RX ORDER — PREDNISONE 20 MG/1
20 TABLET ORAL DAILY
Qty: 15 TABLET | Refills: 0 | Status: SHIPPED | OUTPATIENT
Start: 2024-04-15

## 2024-04-15 RX ORDER — DEXTROAMPHETAMINE SACCHARATE, AMPHETAMINE ASPARTATE MONOHYDRATE, DEXTROAMPHETAMINE SULFATE AND AMPHETAMINE SULFATE 5; 5; 5; 5 MG/1; MG/1; MG/1; MG/1
20 CAPSULE, EXTENDED RELEASE ORAL EVERY MORNING
Qty: 30 CAPSULE | Refills: 0 | Status: SHIPPED | OUTPATIENT
Start: 2024-04-15

## 2024-04-15 NOTE — PROGRESS NOTES
"Chief Complaint  Establish Care (Pt requested to have thyroid levels checked and complains of nerve pain in her right leg at night (both but worse on right)/-requested new scripts for all meds to be sent over [facility] )    Subjective        Josefa Nazario presents to [facility]  History of Present Illness  58yoF with HLD, ADHD, hypothyroidism who presents to establish care    Hypothyroidism - on levothyroxine 150mcg daily. TSH was slightly high in July when last checked.   ADHD - on adderall with psych. No cardiac history.     Insomnia - on trazodone PRN.   HRT managed by GYN.     She is having discomfort in right leg that is worse at night when lying flat. Some tenderness through right buttox. No inciting event.       Objective   Vital Signs:  /76 (BP Location: Right arm, Patient Position: Sitting, Cuff Size: Adult)   Pulse 108   Temp 98.2 °F (36.8 °C) (Infrared)   Ht 157.5 cm (62\")   Wt 76.2 kg (168 lb)   SpO2 97%   BMI 30.73 kg/m²   Estimated body mass index is 30.73 kg/m² as calculated from the following:    Height as of this encounter: 157.5 cm (62\").    Weight as of this encounter: 76.2 kg (168 lb).         Physical Exam  Constitutional:       General: She is not in acute distress.  Eyes:      Conjunctiva/sclera: Conjunctivae normal.   Cardiovascular:      Rate and Rhythm: Normal rate and regular rhythm.   Pulmonary:      Effort: Pulmonary effort is normal. No respiratory distress.      Breath sounds: Normal breath sounds.   Abdominal:      Tenderness: There is no abdominal tenderness.   Musculoskeletal:      Comments: Tenderness across right piriformis and greater trochanteric bursae.   Skin:     General: Skin is warm and dry.   Neurological:      Mental Status: She is alert and oriented to person, place, and time.      Sensory: No sensory deficit.      Motor: No weakness.   Psychiatric:         Mood and Affect: Mood normal.         Behavior: Behavior normal.      "   Result Review :    The following data was reviewed by: Sherron Carter MD on 04/15/2024:  Common labs          4/15/2024    14:29   Common Labs   Glucose 85    BUN 15    Creatinine 0.84    Sodium 136    Potassium 4.7    Chloride 100    Calcium 9.9    Total Protein 7.4    Albumin 4.6    Total Bilirubin 0.3    Alkaline Phosphatase 90    AST (SGOT) 21    ALT (SGPT) 20    WBC 9.90    Hemoglobin 13.0    Hematocrit 38.8    Platelets 236    Total Cholesterol 250    Triglycerides 149    HDL Cholesterol 76    LDL Cholesterol  148    Hemoglobin A1C 5.60      Data reviewed : none             Assessment and Plan     Diagnoses and all orders for this visit:    1. Sciatica of right side (Primary)  -     Ambulatory Referral to Physical Therapy    2. Arthralgia, unspecified joint  -     predniSONE (DELTASONE) 20 MG tablet; Take 1 tablet by mouth Daily.  Dispense: 15 tablet; Refill: 0  -     SHERRI  -     C-reactive Protein  -     Sedimentation Rate    3. Piriformis syndrome of right side  -     Ambulatory Referral to Physical Therapy    4. Screening for deficiency anemia  -     CBC Auto Differential    5. Screening for ischemic heart disease  -     Comprehensive Metabolic Panel  -     ORDER: Hemoglobin A1c  -     Lipid Panel  -     TSH    6. Insomnia, unspecified type  Assessment & Plan:  Chronic, stable.   Continue trazodone PRN.       7. Attention deficit hyperactivity disorder (ADHD), unspecified ADHD type  Assessment & Plan:  Stable on Adderall- managed by Psychiatrist.       8. Hypothyroidism (acquired)  Assessment & Plan:  Elevated TSH --> levothyroxine incr to 150mcg at that time.   Continue. Repeat TSH.      Other orders  -     CBC & Differential             Follow Up     No follow-ups on file.  Patient was given instructions and counseling regarding her condition or for health maintenance advice. Please see specific information pulled into the AVS if appropriate.

## 2024-04-16 LAB
ALBUMIN SERPL-MCNC: 4.6 G/DL (ref 3.5–5.2)
ALBUMIN/GLOB SERPL: 1.6 G/DL
ALP SERPL-CCNC: 90 U/L (ref 39–117)
ALT SERPL-CCNC: 20 U/L (ref 1–33)
ANA SER QL: NEGATIVE
AST SERPL-CCNC: 21 U/L (ref 1–32)
BASOPHILS # BLD AUTO: 0.05 10*3/MM3 (ref 0–0.2)
BASOPHILS NFR BLD AUTO: 0.5 % (ref 0–1.5)
BILIRUB SERPL-MCNC: 0.3 MG/DL (ref 0–1.2)
BUN SERPL-MCNC: 15 MG/DL (ref 6–20)
BUN/CREAT SERPL: 17.9 (ref 7–25)
CALCIUM SERPL-MCNC: 9.9 MG/DL (ref 8.6–10.5)
CHLORIDE SERPL-SCNC: 100 MMOL/L (ref 98–107)
CHOLEST SERPL-MCNC: 250 MG/DL (ref 0–200)
CO2 SERPL-SCNC: 24 MMOL/L (ref 22–29)
CREAT SERPL-MCNC: 0.84 MG/DL (ref 0.57–1)
CRP SERPL-MCNC: 0.33 MG/DL (ref 0–0.5)
EGFRCR SERPLBLD CKD-EPI 2021: 80.2 ML/MIN/1.73
EOSINOPHIL # BLD AUTO: 0.16 10*3/MM3 (ref 0–0.4)
EOSINOPHIL NFR BLD AUTO: 1.6 % (ref 0.3–6.2)
ERYTHROCYTE [DISTWIDTH] IN BLOOD BY AUTOMATED COUNT: 12.2 % (ref 12.3–15.4)
ERYTHROCYTE [SEDIMENTATION RATE] IN BLOOD BY WESTERGREN METHOD: 6 MM/HR (ref 0–30)
GLOBULIN SER CALC-MCNC: 2.8 GM/DL
GLUCOSE SERPL-MCNC: 85 MG/DL (ref 65–99)
HBA1C MFR BLD: 5.6 % (ref 4.8–5.6)
HCT VFR BLD AUTO: 38.8 % (ref 34–46.6)
HDLC SERPL-MCNC: 76 MG/DL (ref 40–60)
HGB BLD-MCNC: 13 G/DL (ref 12–15.9)
IMM GRANULOCYTES # BLD AUTO: 0.02 10*3/MM3 (ref 0–0.05)
IMM GRANULOCYTES NFR BLD AUTO: 0.2 % (ref 0–0.5)
LDLC SERPL CALC-MCNC: 148 MG/DL (ref 0–100)
LYMPHOCYTES # BLD AUTO: 2.47 10*3/MM3 (ref 0.7–3.1)
LYMPHOCYTES NFR BLD AUTO: 24.9 % (ref 19.6–45.3)
MCH RBC QN AUTO: 29.6 PG (ref 26.6–33)
MCHC RBC AUTO-ENTMCNC: 33.5 G/DL (ref 31.5–35.7)
MCV RBC AUTO: 88.4 FL (ref 79–97)
MONOCYTES # BLD AUTO: 0.76 10*3/MM3 (ref 0.1–0.9)
MONOCYTES NFR BLD AUTO: 7.7 % (ref 5–12)
NEUTROPHILS # BLD AUTO: 6.44 10*3/MM3 (ref 1.7–7)
NEUTROPHILS NFR BLD AUTO: 65.1 % (ref 42.7–76)
NRBC BLD AUTO-RTO: 0 /100 WBC (ref 0–0.2)
PLATELET # BLD AUTO: 236 10*3/MM3 (ref 140–450)
POTASSIUM SERPL-SCNC: 4.7 MMOL/L (ref 3.5–5.2)
PROT SERPL-MCNC: 7.4 G/DL (ref 6–8.5)
RBC # BLD AUTO: 4.39 10*6/MM3 (ref 3.77–5.28)
SODIUM SERPL-SCNC: 136 MMOL/L (ref 136–145)
TRIGL SERPL-MCNC: 149 MG/DL (ref 0–150)
TSH SERPL DL<=0.005 MIU/L-ACNC: 5.28 UIU/ML (ref 0.27–4.2)
VLDLC SERPL CALC-MCNC: 26 MG/DL (ref 5–40)
WBC # BLD AUTO: 9.9 10*3/MM3 (ref 3.4–10.8)

## 2024-05-21 DIAGNOSIS — G47.00 INSOMNIA, UNSPECIFIED TYPE: ICD-10-CM

## 2024-05-21 RX ORDER — TRAZODONE HYDROCHLORIDE 50 MG/1
50 TABLET ORAL NIGHTLY
Qty: 30 TABLET | Refills: 0 | Status: SHIPPED | OUTPATIENT
Start: 2024-05-21

## 2024-05-29 ENCOUNTER — TELEPHONE (OUTPATIENT)
Dept: PSYCHIATRY | Facility: CLINIC | Age: 59
End: 2024-05-29
Payer: COMMERCIAL

## 2024-06-03 RX ORDER — LEVOTHYROXINE SODIUM 0.15 MG/1
150 TABLET ORAL DAILY
Qty: 90 TABLET | Refills: 3 | Status: SHIPPED | OUTPATIENT
Start: 2024-06-03

## 2024-06-03 NOTE — TELEPHONE ENCOUNTER
Incoming Refill Request      Medication requested (name and dose):   levothyroxine (Synthroid) 150 MCG tablet  150 mcg, Daily       Pharmacy where request should be sent: [facility] [id number] [place] [id number] [facility]  AT [address] - [phone] [place] [phone] [facility] [phone]     Additional details provided by patient: NONE    Best call back number: [phone]    Does the patient have less than a 3 day supply:  [x] Yes  [] No    Karyn Pryor Rep  06/03/24, 09:38 EDT

## 2024-06-03 NOTE — TELEPHONE ENCOUNTER
Rx Refill Note  Requested Prescriptions     Pending Prescriptions Disp Refills    levothyroxine (Synthroid) 150 MCG tablet 90 tablet 1     Sig: Take 1 tablet by mouth Daily. Brand name only      Last office visit with prescribing clinician: 4/15/2024   Last telemedicine visit with prescribing clinician: Visit date not found   Next office visit with prescribing clinician: Visit date not found                         Would you like a call back once the refill request has been completed: [] Yes [] No    If the office needs to give you a call back, can they leave a voicemail: [] Yes [] No    Johan Vieira CMA/LMR  06/03/24, 09:47 EDT

## 2024-06-06 ENCOUNTER — TELEMEDICINE (OUTPATIENT)
Dept: PSYCHIATRY | Facility: CLINIC | Age: 59
End: 2024-06-06
Payer: COMMERCIAL

## 2024-06-06 DIAGNOSIS — F90.2 ATTENTION DEFICIT HYPERACTIVITY DISORDER, COMBINED TYPE: ICD-10-CM

## 2024-06-06 DIAGNOSIS — G47.00 INSOMNIA, UNSPECIFIED TYPE: ICD-10-CM

## 2024-06-06 PROCEDURE — 99213 OFFICE O/P EST LOW 20 MIN: CPT | Performed by: NURSE PRACTITIONER

## 2024-06-06 RX ORDER — TRAZODONE HYDROCHLORIDE 50 MG/1
50 TABLET ORAL NIGHTLY
Qty: 30 TABLET | Refills: 3 | Status: SHIPPED | OUTPATIENT
Start: 2024-06-06

## 2024-06-06 RX ORDER — DEXTROAMPHETAMINE SACCHARATE, AMPHETAMINE ASPARTATE MONOHYDRATE, DEXTROAMPHETAMINE SULFATE AND AMPHETAMINE SULFATE 5; 5; 5; 5 MG/1; MG/1; MG/1; MG/1
20 CAPSULE, EXTENDED RELEASE ORAL EVERY MORNING
Qty: 30 CAPSULE | Refills: 0 | Status: SHIPPED | OUTPATIENT
Start: 2024-06-06

## 2024-06-06 NOTE — PROGRESS NOTES
This provider is located at [facility], [address] [place] [address].The Patient is seen remotely at home, [address] [facility] [address] [facility] [place] [address] via [employer]. Patient is being seen via telehealth and confirm that they are in a secure environment for this session. The patient's condition being diagnosed/treated is appropriate for telemedicine. The provider identified himself/herself: herself as well as her credentials.   The patient gave consent to be seen remotely, and when consent is given they understand that the consent allows for patient identifiable information to be sent to a third party as needed.   They may refuse to be seen remotely at any time. The electronic data is encrypted and password protected, and the patient has been advised of the potential risks to privacy not withstanding such measures.    You have chosen to receive care through a telehealth visit.  Do you consent to use a video/audio connection for your medical care today? Yes. Patient verified Name, , and address.       Chief Complaint  ADHD management     Subjective    Josefa Nazario presents to [facility] for medication management.     History of Present Illness   Patient presents this morning reporting that she is doing good.  Patient denies any depressive symptoms or feeling hopeless or helpless.  Patient states that her mood has been good and she has been sleeping and eating well.  Denies any medical or medicine changes.  Patient states her anxiety has been fine as she does not always take her ADHD medicine and she can manage without it at times.  She feels it is lasting throughout the day and tolerating well with no side effects.  Denies any changes in blood pressure or heart rate or any heart palpitations or chest pain.  Denies any SI/HI/AH/VH.    Without the medication patient has problems with attention and focus including easily distracted, easily losing  objects, trouble with time management, trouble completing tasks because of distractions, procrastination, indecisiveness, spends lots of extra time compensating for trouble with inattention/focus by making multiple to-do lists, careless mistakes at work, not finishing jobs that are started.  Patient denies any side effects, no worsening of insomnia, no worsening of anxiety on the medication dose.       Objective   Vital Signs:   There were no vitals taken for this visit.  Due to the remote nature of this encounter (virtual encounter), vitals were unable to be obtained.  Height stated at 62 inches.  Weight stated at 159 pounds.      PHQ-9 Score:   PHQ-9 Total Score:       Patient screened  for depression based on a PHQ-9 score of  on . Follow-up recommendations include:  see notes and medication list .    PHQ-9 Total Score:    PHQ-9 Depression Screening  Little interest or pleasure in doing things?     Feeling down, depressed, or hopeless?     Trouble falling or staying asleep, or sleeping too much?     Feeling tired or having little energy?     Poor appetite or overeating?     Feeling bad about yourself - or that you are a failure or have let yourself or your family down?     Trouble concentrating on things, such as reading the newspaper or watching television?     Moving or speaking so slowly that other people could have noticed? Or the opposite - being so fidgety or restless that you have been moving around a lot more than usual?     Thoughts that you would be better off dead, or of hurting yourself in some way?     PHQ-9 Total Score     If you checked off any problems, how difficult have these problems made it for you to do your work, take care of things at home, or get along with other people?           Feeling nervous, anxious or on edge: (P) Not at all  Not being able to stop or control worrying: (P) Not at all  Worrying too much about different things: (P) Not at all  Trouble Relaxing: (P) Not at all  Being  so restless that it is hard to sit still: (P) Not at all  Feeling afraid as if something awful might happen: (P) Not at all  Becoming easily annoyed or irritable: (P) Not at all  EMELYN 7 Total Score: (P) 0      PROMIS scale screening tool that patient filled out virtually reviewed by this APRN at today's encounter.      Mental Status Exam:   Hygiene:   good  Cooperation:  Cooperative  Eye Contact:  Good  Psychomotor Behavior:  Appropriate  Affect:  Appropriate  Mood: normal  Speech:  Normal  Thought Process:  Goal directed  Thought Content:  Normal  Suicidal:  None  Homicidal:  None  Hallucinations:  None  Delusion:  None  Memory:  Intact  Orientation:  Person, Place, Time and Situation  Reliability:  good  Insight:  Good  Judgement:  Good and Fair  Impulse Control:  Good  Physical/Medical Issues:  Yes Hypothyroidism      Current Medications:   Current Outpatient Medications   Medication Sig Dispense Refill    amphetamine-dextroamphetamine XR (Adderall XR) 20 MG 24 hr capsule Take 1 capsule by mouth Every Morning 30 capsule 0    traZODone (DESYREL) 50 MG tablet Take 1 tablet by mouth Every Night. 30 tablet 3    estradiol (ESTRACE) 0.5 MG tablet       levothyroxine (Synthroid) 150 MCG tablet Take 1 tablet by mouth Daily. Brand name only 90 tablet 3    predniSONE (DELTASONE) 20 MG tablet Take 1 tablet by mouth Daily. 15 tablet 0    Progesterone (PROMETRIUM) 100 MG capsule        No current facility-administered medications for this visit.       Physical Exam  Nursing note reviewed.   Constitutional:       Appearance: Normal appearance.   Neurological:      Mental Status: She is alert.   Psychiatric:         Attention and Perception: Attention and perception normal.         Mood and Affect: Mood and affect normal.         Speech: Speech normal.         Behavior: Behavior is cooperative.         Thought Content: Thought content normal.         Cognition and Memory: Cognition and memory normal.         Judgment: Judgment  normal.        Result Review :     The following data was reviewed by: EVELINA Peace on 08/18/2022:  Common labs          4/15/2024    14:29   Common Labs   Glucose 85    BUN 15    Creatinine 0.84    Sodium 136    Potassium 4.7    Chloride 100    Calcium 9.9    Total Protein 7.4    Albumin 4.6    Total Bilirubin 0.3    Alkaline Phosphatase 90    AST (SGOT) 21    ALT (SGPT) 20    WBC 9.90    Hemoglobin 13.0    Hematocrit 38.8    Platelets 236    Total Cholesterol 250    Triglycerides 149    HDL Cholesterol 76    LDL Cholesterol  148    Hemoglobin A1C 5.60      Data reviewed: PCP notes         Assessment and Plan    Problem List Items Addressed This Visit          Sleep    Insomnia    Relevant Medications    traZODone (DESYREL) 50 MG tablet     Other Visit Diagnoses       Attention deficit hyperactivity disorder, combined type        Relevant Medications    amphetamine-dextroamphetamine XR (Adderall XR) 20 MG 24 hr capsule    traZODone (DESYREL) 50 MG tablet    Other Relevant Orders    ToxAssure Flex 22, Ur w/DL -    Urine Drug Screen - Urine, Clean Catch                  TREATMENT PLAN/GOALS: Continue supportive psychotherapy efforts and medications as indicated. Treatment and medication options discussed during today's visit. Patient ackowledged and verbally consented to continue with current treatment plan and was educated on the importance of compliance with treatment and follow-up appointments.    MEDICATION ISSUES:  We discussed risks, benefits, and side effects of the above medications and the patient was agreeable with the plan. Patient was educated on the importance of compliance with treatment and follow-up appointments.  Patient is agreeable to call the office with any worsening of symptoms or onset of side effects. Patient is agreeable to call 911 or go to the nearest ER should he/she begin having SI/HI.      Controlled Substance Medication Contract    Controlled substance medications (i.e.  "benzodiazepines, opioids, amphetamines) are very useful, but have a high potential for misuse and are, therefore, closely controlled by local, state, and federal government(s). As a patient of [facility], you agree and understand the followin. I am responsible for the controlled substance medications prescribed to me. If my prescription is misplaced, stolen, or if \"I run out early,\" I understand this medication will not be replaced regardless of the circumstances.  2. Refills of controlled substance medications: (a) Will be made only during regular office hours Monday-Thursday once a month and during a scheduled virtual appointment. Refills will not be made at night, weekends, or on holidays. (b) Will not be made if \"I lost my prescriptions,\" \"ran out early,\" or \"misplaced my medication\". I am solely responsible for taking the medication as prescribed and for keeping track of the remaining.   3. I agree to comply with urine drug testing and pill counts at the provider's discretion, thereby, documenting the proper use of any medications. If alcohol abuse is suspected, a breathalyzer or blood alcohol level may be ordered. Unannounced urine or serum toxicology specimens may be requested and my cooperation is required.  4. I understand that if I violate any of the above conditions, my prescriptions for controlled medications my be terminated. If the violation involves obtaining these medications from another individual, or the concomitant use of non-prescription illicit (illegal) drugs, I may also be reported to other providers, pharmacies, medical facilities and the appropriate authorities.   5. I further understand that if I violate this controlled substance contract due to non-compliance of medical directions, such as the failure in taking medications as prescribed, utilizing other illicit drugs, or abuse of controlled medications, the prescription for controlled medications may " be terminated.   6. I agree to keep my scheduled appointments and conduct myself in a courteous manner.  7. I agree not to sell, share, or give any medication to another person. I understand that such mishandling of my medication is a serious violation of this agreement and would result in my treatment being terminated without any recourse for appeal.   8. I agree not to take my medication from any physicians, nurse practitioners, pharmacies or other sources without telling my prescriber.  9. I agree to take my medication as my prescriber has instructed and not to alter the way I take my medication without first consulting my prescriber.  10. I agree to abstain from problematic alcohol usage, opioids, marijuana, cocaine, and other addictive substances.   11. If I am legally involved related to legal or illegal drugs, including alcohol, refill of controlled substances will not be given until a re-evaluation of my chemical dependency treatment plan has been completed. Refills are at the discretion of the prescriber.   12. I agree to fill all of my controlled medications at an in-state ([place]) pharmacy.   13. I understand that [facility] utilizes the [place] All Schedule Prescription Electronic Reporting (VIOLET) system and will monitor my prescription history via this source.  14. Benzodiazepines are drugs prescribed to treat conditions like anxiety, insomnia, and seizures. Examples of these drugs include: alprazolam, clonazepam, diazepam, and lorazepam. The FDA has applied a Black Box Warning (one of the strictest warnings) that the use of opioids and benzodiazepines together have serious risks to include unusual dizziness or lightheadedness, extreme sleepiness, slowed or difficult breathing, coma and death. There is an added risk if alcohol is also ingested. It is the policy of [facility] to NOT prescribe benzodiazepines to patients who also use  opioids. If a patient already presents already prescribed both, the prescriber my direct the patient to their previous provider who prescribed it or taper the benzodiazepine as part of the treatment plan. These patients must be monitored at appropriate intervals and so visits may be more frequent.     This APRN has discussed and reviewed this information with the patient and/or guardian. The patient and/or guardian verbally agreed (no signatures are obtained during today's visit as they are a telehealth patient and is unable to print and sign this document, therefore, verbal agreement has been obtained).     -Continue Adderall XR 20 daily for ADHD symptoms.  Patient has no longer noticed any side effects to the generic.  She has failed and tried methylphenidate as well as amphetamine-dextroamphetamine sustained release.  -Continue trazodone 50 mg at night for sleep.        Counseled patient regarding multimodal approach with healthy nutrition, healthy sleep, regular physical activity, social activities, counseling, and medications.      Coping skills reviewed and encouraged positive framing of thoughts     Assisted patient in processing above session content; acknowledged and normalized patient’s thoughts, feelings, and concerns.  Applied  positive coping skills and behavior management in session.  Allowed patient to freely discuss issues without interruption or judgment. Provided safe, confidential environment to facilitate the development of positive therapeutic relationship and encourage open, honest communication. Assisted patient in identifying risk factors which would indicate the need for higher level of care including thoughts to harm self or others and/or self-harming behavior and encouraged patient to contact this office, call 911, or present to the nearest emergency room should any of these events occur. Discussed crisis intervention services and means to access.     MEDS ORDERED DURING VISIT:  New  Medications Ordered This Visit   Medications    amphetamine-dextroamphetamine XR (Adderall XR) 20 MG 24 hr capsule     Sig: Take 1 capsule by mouth Every Morning     Dispense:  30 capsule     Refill:  0     Generic for insurance    traZODone (DESYREL) 50 MG tablet     Sig: Take 1 tablet by mouth Every Night.     Dispense:  30 tablet     Refill:  3           Follow Up   Return in about 3 months (around 9/6/2024), or if symptoms worsen or fail to improve, for Recheck.  Encouraged patient to contact the clinic if sooner appointment needed or concerns she verbalized understanding.    Patient was given instructions and counseling regarding her condition or for health maintenance advice. Please see specific information pulled into the AVS if appropriate.     Some of the data in this electronic note has been brought forward from a previous encounter, any necessary changes have been made, it has been reviewed by this APRN, and it is accurate.        This document has been electronically signed by EVELINA Peace  June 6, 2024 10:18 EDT    Part of this note may be an electronic transcription/translation of spoken language to printed text using the Dragon Dictation System.

## 2024-07-15 ENCOUNTER — TELEPHONE (OUTPATIENT)
Dept: PSYCHIATRY | Facility: CLINIC | Age: 59
End: 2024-07-15
Payer: COMMERCIAL

## 2024-07-15 NOTE — TELEPHONE ENCOUNTER
Patient requested UDS order to be faxed to [facility] [phone]. Order was faxed at 9:53am.

## 2024-07-16 LAB
AMPHETAMINES UR QL SCN: POSITIVE NG/ML
BARBITURATES UR QL SCN: NEGATIVE NG/ML
BENZODIAZ UR QL SCN: NEGATIVE NG/ML
BZE UR QL SCN: NEGATIVE NG/ML
CANNABINOIDS UR QL SCN: NEGATIVE NG/ML
CREAT UR-MCNC: 129.3 MG/DL (ref 20–300)
LABORATORY COMMENT REPORT: ABNORMAL
METHADONE UR QL SCN: NEGATIVE NG/ML
OPIATES UR QL SCN: NEGATIVE NG/ML
OXYCODONE+OXYMORPHONE UR QL SCN: NEGATIVE NG/ML
PCP UR QL: NEGATIVE NG/ML
PH UR: 5.1 [PH] (ref 4.5–8.9)
PROPOXYPH UR QL SCN: NEGATIVE NG/ML

## 2024-07-22 DIAGNOSIS — F90.2 ATTENTION DEFICIT HYPERACTIVITY DISORDER, COMBINED TYPE: ICD-10-CM

## 2024-07-22 RX ORDER — DEXTROAMPHETAMINE SACCHARATE, AMPHETAMINE ASPARTATE MONOHYDRATE, DEXTROAMPHETAMINE SULFATE AND AMPHETAMINE SULFATE 5; 5; 5; 5 MG/1; MG/1; MG/1; MG/1
20 CAPSULE, EXTENDED RELEASE ORAL EVERY MORNING
Qty: 30 CAPSULE | Refills: 0 | Status: SHIPPED | OUTPATIENT
Start: 2024-07-22

## 2024-09-23 ENCOUNTER — TELEMEDICINE (OUTPATIENT)
Dept: PSYCHIATRY | Facility: CLINIC | Age: 59
End: 2024-09-23
Payer: COMMERCIAL

## 2024-09-23 DIAGNOSIS — F90.2 ATTENTION DEFICIT HYPERACTIVITY DISORDER, COMBINED TYPE: Primary | Chronic | ICD-10-CM

## 2024-09-23 DIAGNOSIS — G47.00 INSOMNIA, UNSPECIFIED TYPE: Chronic | ICD-10-CM

## 2024-09-23 DIAGNOSIS — F41.1 GENERALIZED ANXIETY DISORDER: ICD-10-CM

## 2024-09-23 PROBLEM — E03.9 HYPOTHYROIDISM: Status: ACTIVE | Noted: 2024-09-23

## 2024-09-23 PROCEDURE — 99214 OFFICE O/P EST MOD 30 MIN: CPT | Performed by: NURSE PRACTITIONER

## 2024-09-23 PROCEDURE — 96127 BRIEF EMOTIONAL/BEHAV ASSMT: CPT | Performed by: NURSE PRACTITIONER

## 2024-09-23 RX ORDER — TRAZODONE HYDROCHLORIDE 50 MG/1
50 TABLET, FILM COATED ORAL NIGHTLY
Qty: 30 TABLET | Refills: 3 | Status: SHIPPED | OUTPATIENT
Start: 2024-09-23

## 2024-09-23 RX ORDER — ESTRADIOL 1 MG/1
TABLET ORAL
COMMUNITY
Start: 2024-09-16

## 2024-09-23 RX ORDER — DEXTROAMPHETAMINE SACCHARATE, AMPHETAMINE ASPARTATE MONOHYDRATE, DEXTROAMPHETAMINE SULFATE AND AMPHETAMINE SULFATE 5; 5; 5; 5 MG/1; MG/1; MG/1; MG/1
20 CAPSULE, EXTENDED RELEASE ORAL EVERY MORNING
Qty: 30 CAPSULE | Refills: 0 | Status: SHIPPED | OUTPATIENT
Start: 2024-09-23

## 2024-11-20 DIAGNOSIS — F90.2 ATTENTION DEFICIT HYPERACTIVITY DISORDER, COMBINED TYPE: Chronic | ICD-10-CM

## 2024-11-20 RX ORDER — DEXTROAMPHETAMINE SACCHARATE, AMPHETAMINE ASPARTATE MONOHYDRATE, DEXTROAMPHETAMINE SULFATE AND AMPHETAMINE SULFATE 5; 5; 5; 5 MG/1; MG/1; MG/1; MG/1
20 CAPSULE, EXTENDED RELEASE ORAL EVERY MORNING
Qty: 30 CAPSULE | Refills: 0 | Status: SHIPPED | OUTPATIENT
Start: 2024-11-20

## 2024-11-30 NOTE — ANESTHESIA POSTPROCEDURE EVALUATION
Patient: Josefa Nazario    Procedure Summary     Date: 04/25/23 Room / Location:  [facility] /  [facility]    Anesthesia Start: 1301 Anesthesia Stop: 1329    Procedure: COLONOSCOPY to cecum/ terminal iluem with biopsy, hot, and cold snare polypectomies Diagnosis:       FH: colon polyps      (FH: colon polyps [Z83.71])    Surgeons: Noemy Hendrickson MD Provider: Jose Antonio Graham MD    Anesthesia Type: MAC ASA Status: 2          Anesthesia Type: MAC    Vitals  Vitals Value Taken Time   BP 99/62 04/25/23 1327   Temp     Pulse 63 04/25/23 1327   Resp 16 04/25/23 1327   SpO2 94 % 04/25/23 1327           Post Anesthesia Care and Evaluation    Patient location during evaluation: PHASE II  Patient participation: complete - patient participated  Level of consciousness: awake and alert  Pain management: adequate    Airway patency: patent  Anesthetic complications: No anesthetic complications  PONV Status: none  Cardiovascular status: acceptable and hemodynamically stable  Respiratory status: acceptable, nonlabored ventilation and spontaneous ventilation  Hydration status: acceptable       Stage 3 chronic kidney disease

## 2024-12-30 ENCOUNTER — TELEMEDICINE (OUTPATIENT)
Dept: PSYCHIATRY | Facility: CLINIC | Age: 59
End: 2024-12-30
Payer: COMMERCIAL

## 2024-12-30 DIAGNOSIS — F90.2 ATTENTION DEFICIT HYPERACTIVITY DISORDER, COMBINED TYPE: Chronic | ICD-10-CM

## 2024-12-30 DIAGNOSIS — G47.00 INSOMNIA, UNSPECIFIED TYPE: Chronic | ICD-10-CM

## 2024-12-30 PROCEDURE — 96127 BRIEF EMOTIONAL/BEHAV ASSMT: CPT | Performed by: NURSE PRACTITIONER

## 2024-12-30 PROCEDURE — 99214 OFFICE O/P EST MOD 30 MIN: CPT | Performed by: NURSE PRACTITIONER

## 2024-12-30 RX ORDER — TRAZODONE HYDROCHLORIDE 50 MG/1
50 TABLET, FILM COATED ORAL NIGHTLY
Qty: 90 TABLET | Refills: 0 | Status: SHIPPED | OUTPATIENT
Start: 2024-12-30

## 2024-12-30 RX ORDER — DEXTROAMPHETAMINE SACCHARATE, AMPHETAMINE ASPARTATE MONOHYDRATE, DEXTROAMPHETAMINE SULFATE AND AMPHETAMINE SULFATE 5; 5; 5; 5 MG/1; MG/1; MG/1; MG/1
20 CAPSULE, EXTENDED RELEASE ORAL EVERY MORNING
Qty: 30 CAPSULE | Refills: 0 | Status: SHIPPED | OUTPATIENT
Start: 2024-12-30

## 2024-12-30 NOTE — PROGRESS NOTES
This provider is located at [facility], [address] [place] [address].The Patient is seen remotely at home, [birth year][address] [place] [address] via my chart.  Patient is being seen via telehealth and confirm that they are in a secure environment for this session. The patient's condition being diagnosed/treated is appropriate for telemedicine. The provider identified himself/herself: herself as well as her credentials.   The patient gave consent to be seen remotely, and when consent is given they understand that the consent allows for patient identifiable information to be sent to a third party as needed.   They may refuse to be seen remotely at any time. The electronic data is encrypted and password protected, and the patient has been advised of the potential risks to privacy not withstanding such measures.    You have chosen to receive care through a telehealth visit.  Do you consent to use a video/audio connection for your medical care today? Yes. Patient verified Name, , and address.       Chief Complaint  ADHD and sleep    Subjective          Josefa Nazario presents to [facility] for medication management.    History of Present Illness  Patient presents today reporting that she has been doing good.  She states that she had a good holiday with her family.  Denies any depressive symptoms.  States sleep and appetite are good reports she still uses the trazodone which is effective for her sleep.  Notes slight anxiety during the holidays but otherwise doing well.  States focus and attention is good as long as she takes her medication she is able to stay on task.  Denies any side effects with medication or increase in blood pressure or heart rate or chest pain.  She states that her last PCP visit she talked to her doctor about a routine EKG and her primary care said nothing warranted and EKG is everything was in normal limits with no  cardiac history.  Denies any SI/HI/AH/VH.      Without the medication patient has problems with attention and focus including easily distracted, easily losing objects, trouble with time management, trouble completing tasks because of distractions, procrastination, indecisiveness, spends lots of extra time compensating for trouble with inattention/focus by making multiple to-do lists, careless mistakes at work, not finishing jobs that are started.  Patient denies any side effects, no worsening of insomnia, no worsening of anxiety on the medication dose.     Objective   Vital Signs:   There were no vitals taken for this visit.  Due to the remote nature of this encounter (virtual encounter), vitals were unable to be obtained.  Height stated at 62 inches.  Weight stated at 168 pounds.      PHQ-9 Score:   PHQ-9 Total Score:(Patient-Rptd) 1     PHQ-9 Depression Screening  Little interest or pleasure in doing things? (Patient-Rptd) Not at all   Feeling down, depressed, or hopeless? (Patient-Rptd) Not at all   PHQ-2 Total Score (Patient-Rptd) 0   Trouble falling or staying asleep, or sleeping too much? (Patient-Rptd) Several days   Feeling tired or having little energy? (Patient-Rptd) Not at all   Poor appetite or overeating? (Patient-Rptd) Not at all   Feeling bad about yourself - or that you are a failure or have let yourself or your family down? (Patient-Rptd) Not at all   Trouble concentrating on things, such as reading the newspaper or watching television? (Patient-Rptd) Not at all   Moving or speaking so slowly that other people could have noticed? Or the opposite - being so fidgety or restless that you have been moving around a lot more than usual? (Patient-Rptd) Not at all   Thoughts that you would be better off dead, or of hurting yourself in some way? (Patient-Rptd) Not at all   PHQ-9 Total Score (Patient-Rptd) 1   If you checked off any problems, how difficult have these problems made it for you to do your work,  take care of things at home, or get along with other people?           PHQ-9 Total Score: (Patient-Rptd) 1     EMELYN-7  Feeling nervous, anxious or on edge: (Patient-Rptd) Several days  Not being able to stop or control worrying: (Patient-Rptd) Not at all  Worrying too much about different things: (Patient-Rptd) Not at all  Trouble Relaxing: (Patient-Rptd) Not at all  Being so restless that it is hard to sit still: (Patient-Rptd) Not at all  Feeling afraid as if something awful might happen: (Patient-Rptd) Not at all  Becoming easily annoyed or irritable: (Patient-Rptd) Not at all  EMELYN 7 Total Score: (Patient-Rptd) 1  If you checked any problems, how difficult have these problems made it for you to do your work, take care of things at home, or get along with other people: (Patient-Rptd) Not difficult at all      Patient screened positive for depression based on a PHQ-9 score of 1 on 12/29/2024. Follow-up recommendations include:  See notes and medication list .        Mental Status Exam:   Hygiene:   good  Cooperation:  Cooperative  Eye Contact:  Good  Psychomotor Behavior:  Appropriate  Affect:  Appropriate  Mood: normal  Speech:  Normal  Thought Process:  Goal directed  Thought Content:  Normal  Suicidal:  None  Homicidal:  None  Hallucinations:  None  Delusion:  None  Memory:  Intact  Orientation:  Person, Place, Time, and Situation  Reliability:  good  Insight:  Good  Judgement:  Good  Impulse Control:  Good  Physical/Medical Issues:  No      Current Medications:   Current Outpatient Medications   Medication Sig Dispense Refill    amphetamine-dextroamphetamine XR (Adderall XR) 20 MG 24 hr capsule Take 1 capsule by mouth Every Morning 30 capsule 0    traZODone (DESYREL) 50 MG tablet Take 1 tablet by mouth Every Night. 90 tablet 0    estradiol (ESTRACE) 0.5 MG tablet       estradiol (ESTRACE) 1 MG tablet       levothyroxine (Synthroid) 150 MCG tablet Take 1 tablet by mouth Daily. Brand name only 90 tablet 3     predniSONE (DELTASONE) 20 MG tablet Take 1 tablet by mouth Daily. 15 tablet 0    Progesterone (PROMETRIUM) 100 MG capsule        No current facility-administered medications for this visit.       Physical Exam  Nursing note reviewed.   Constitutional:       Appearance: Normal appearance.   Neurological:      Mental Status: She is alert.   Psychiatric:         Attention and Perception: Attention and perception normal.         Mood and Affect: Mood and affect normal.         Speech: Speech normal.         Behavior: Behavior normal. Behavior is cooperative.         Thought Content: Thought content normal.         Cognition and Memory: Cognition and memory normal.         Judgment: Judgment normal.        Result Review :     The following data was reviewed by: EVELINA Peace on 12/30/2024:  Common labs          4/15/2024    14:29   Common Labs   Glucose 85    BUN 15    Creatinine 0.84    Sodium 136    Potassium 4.7    Chloride 100    Calcium 9.9    Total Protein 7.4    Albumin 4.6    Total Bilirubin 0.3    Alkaline Phosphatase 90    AST (SGOT) 21    ALT (SGPT) 20    WBC 9.90    Hemoglobin 13.0    Hematocrit 38.8    Platelets 236    Total Cholesterol 250    Triglycerides 149    HDL Cholesterol 76    LDL Cholesterol  148    Hemoglobin A1C 5.60      CMP          4/15/2024    14:29   CMP   Glucose 85    BUN 15    Creatinine 0.84    Sodium 136    Potassium 4.7    Chloride 100    Calcium 9.9    Total Protein 7.4    Albumin 4.6    Globulin 2.8    Total Bilirubin 0.3    Alkaline Phosphatase 90    AST (SGOT) 21    ALT (SGPT) 20    BUN/Creatinine Ratio 17.9      CBC          4/15/2024    14:29   CBC   WBC 9.90    RBC 4.39    Hemoglobin 13.0    Hematocrit 38.8    MCV 88.4    MCH 29.6    MCHC 33.5    RDW 12.2    Platelets 236      CBC w/diff          4/15/2024    14:29   CBC w/Diff   WBC 9.90    RBC 4.39    Hemoglobin 13.0    Hematocrit 38.8    MCV 88.4    MCH 29.6    MCHC 33.5    RDW 12.2    Platelets 236    Neutrophil Rel  % 65.1    Lymphocyte Rel % 24.9    Monocyte Rel % 7.7    Eosinophil Rel % 1.6    Basophil Rel % 0.5      Lipid Panel          4/15/2024    14:29   Lipid Panel   Total Cholesterol 250    Triglycerides 149    HDL Cholesterol 76    VLDL Cholesterol 26    LDL Cholesterol  148      TSH          4/15/2024    14:29 7/8/2024    08:15   TSH   TSH 5.280  0.512      Electrolytes          4/15/2024    14:29   Electrolytes   Sodium 136    Potassium 4.7    Chloride 100    Calcium 9.9      Renal Profile          4/15/2024    14:29   Renal Profile   BUN 15    Creatinine 0.84      BMP          4/15/2024    14:29   BMP   BUN 15    Creatinine 0.84    Sodium 136    Potassium 4.7    Chloride 100    CO2 24.0    Calcium 9.9      Most Recent A1C          4/15/2024    14:29   HGBA1C Most Recent   Hemoglobin A1C 5.60        Data reviewed : PCP notes         Assessment and Plan    Problem List Items Addressed This Visit       Insomnia    Relevant Medications    traZODone (DESYREL) 50 MG tablet     Other Visit Diagnoses       Attention deficit hyperactivity disorder, combined type  (Chronic)       Relevant Medications    traZODone (DESYREL) 50 MG tablet    amphetamine-dextroamphetamine XR (Adderall XR) 20 MG 24 hr capsule            Encounter Diagnoses   Name Primary?    Insomnia, unspecified type     Attention deficit hyperactivity disorder, combined type           TREATMENT PLAN/GOALS: Continue supportive psychotherapy efforts and medications as indicated. Treatment and medication options discussed during today's visit. Patient ackowledged and verbally consented to continue with current treatment plan and was educated on the importance of compliance with treatment and follow-up appointments.    MEDICATION ISSUES:  We discussed risks, benefits, and side effects of the above medications and the patient was agreeable with the plan. Patient was educated on the importance of compliance with treatment and follow-up appointments.  Patient is  "agreeable to call the office with any worsening of symptoms or onset of side effects. Patient is agreeable to call 911 or go to the nearest ER should he/she begin having SI/HI.     -Continue Adderall XR 20 mg daily for ADHD symptoms.  -Continue trazodone 50 mg at night as needed for sleep.      Controlled Substance Medication Contract    Controlled substance medications (i.e. benzodiazepines, opioids, amphetamines) are very useful, but have a high potential for misuse and are, therefore, closely controlled by local, state, and federal government(s). As a patient of [facility], you agree and understand the followin. I am responsible for the controlled substance medications prescribed to me. If my prescription is misplaced, stolen, or if \"I run out early,\" I understand this medication will not be replaced regardless of the circumstances.  2. Refills of controlled substance medications: (a) Will be made only during regular office hours Monday-Thursday once a month and during a scheduled virtual appointment. Refills will not be made at night, weekends, or on holidays. (b) Will not be made if \"I lost my prescriptions,\" \"ran out early,\" or \"misplaced my medication\". I am solely responsible for taking the medication as prescribed and for keeping track of the remaining.   3. I agree to comply with urine drug testing and pill counts at the provider's discretion, thereby, documenting the proper use of any medications. If alcohol abuse is suspected, a breathalyzer or blood alcohol level may be ordered. Unannounced urine or serum toxicology specimens may be requested and my cooperation is required.  4. I understand that if I violate any of the above conditions, my prescriptions for controlled medications my be terminated. If the violation involves obtaining these medications from another individual, or the concomitant use of non-prescription illicit (illegal) drugs, I may also be reported to " other providers, pharmacies, medical facilities and the appropriate authorities.   5. I further understand that if I violate this controlled substance contract due to non-compliance of medical directions, such as the failure in taking medications as prescribed, utilizing other illicit drugs, or abuse of controlled medications, the prescription for controlled medications may be terminated.   6. I agree to keep my scheduled appointments and conduct myself in a courteous manner.  7. I agree not to sell, share, or give any medication to another person. I understand that such mishandling of my medication is a serious violation of this agreement and would result in my treatment being terminated without any recourse for appeal.   8. I agree not to take my medication from any physicians, nurse practitioners, pharmacies or other sources without telling my prescriber.  9. I agree to take my medication as my prescriber has instructed and not to alter the way I take my medication without first consulting my prescriber.  10. I agree to abstain from problematic alcohol usage, opioids, marijuana, cocaine, and other addictive substances.   11. If I am legally involved related to legal or illegal drugs, including alcohol, refill of controlled substances will not be given until a re-evaluation of my chemical dependency treatment plan has been completed. Refills are at the discretion of the prescriber.   12. I agree to fill all of my controlled medications at an in-state ([place]) pharmacy.   13. I understand that [facility] utilizes the [place] All Schedule Prescription Electronic Reporting (VIOLET) system and will monitor my prescription history via this source.  14. Benzodiazepines are drugs prescribed to treat conditions like anxiety, insomnia, and seizures. Examples of these drugs include: alprazolam, clonazepam, diazepam, and lorazepam. The FDA has applied a Black Box Warning (one of the strictest  warnings) that the use of opioids and benzodiazepines together have serious risks to include unusual dizziness or lightheadedness, extreme sleepiness, slowed or difficult breathing, coma and death. There is an added risk if alcohol is also ingested. It is the policy of [facility] to NOT prescribe benzodiazepines to patients who also use opioids. If a patient already presents already prescribed both, the prescriber my direct the patient to their previous provider who prescribed it or taper the benzodiazepine as part of the treatment plan. These patients must be monitored at appropriate intervals and so visits may be more frequent.     This APRN has discussed and reviewed this information with the patient and/or guardian. The patient and/or guardian verbally agreed (no signatures are obtained during today's visit as they are a telehealth patient and is unable to print and sign this document, therefore, verbal agreement has been obtained).        Counseled patient regarding multimodal approach with healthy nutrition, healthy sleep, regular physical activity, social activities, counseling, and medications.      Coping skills reviewed and encouraged positive framing of thoughts     Assisted patient in processing above session content; acknowledged and normalized patient’s thoughts, feelings, and concerns.  Applied  positive coping skills and behavior management in session.  Allowed patient to freely discuss issues without interruption or judgment. Provided safe, confidential environment to facilitate the development of positive therapeutic relationship and encourage open, honest communication. Assisted patient in identifying risk factors which would indicate the need for higher level of care including thoughts to harm self or others and/or self-harming behavior and encouraged patient to contact this office, call 911, or present to the nearest emergency room should any of these events occur.  Discussed crisis intervention services and means to access.     MEDS ORDERED DURING VISIT:  New Medications Ordered This Visit   Medications    traZODone (DESYREL) 50 MG tablet     Sig: Take 1 tablet by mouth Every Night.     Dispense:  90 tablet     Refill:  0    amphetamine-dextroamphetamine XR (Adderall XR) 20 MG 24 hr capsule     Sig: Take 1 capsule by mouth Every Morning     Dispense:  30 capsule     Refill:  0     Generic for insurance           Follow Up   Return in about 3 months (around 3/30/2025), or if symptoms worsen or fail to improve, for Recheck.    Patient was given instructions and counseling regarding her condition or for health maintenance advice. Please see specific information pulled into the AVS if appropriate.     Some of the data in this electronic note has been brought forward from a previous encounter, any necessary changes have been made, it has been reviewed by this APRN, and it is accurate.      This document has been electronically signed by EVELINA Peace  December 30, 2024 08:47 EST    Part of this note may be an electronic transcription/translation of spoken language to printed text using the Dragon Dictation System.

## 2025-03-07 DIAGNOSIS — F90.2 ATTENTION DEFICIT HYPERACTIVITY DISORDER, COMBINED TYPE: Chronic | ICD-10-CM

## 2025-03-10 RX ORDER — DEXTROAMPHETAMINE SACCHARATE, AMPHETAMINE ASPARTATE MONOHYDRATE, DEXTROAMPHETAMINE SULFATE AND AMPHETAMINE SULFATE 5; 5; 5; 5 MG/1; MG/1; MG/1; MG/1
20 CAPSULE, EXTENDED RELEASE ORAL EVERY MORNING
Qty: 30 CAPSULE | Refills: 0 | Status: SHIPPED | OUTPATIENT
Start: 2025-03-10

## 2025-03-31 ENCOUNTER — TELEMEDICINE (OUTPATIENT)
Dept: PSYCHIATRY | Facility: CLINIC | Age: 60
End: 2025-03-31
Payer: COMMERCIAL

## 2025-03-31 DIAGNOSIS — F90.2 ATTENTION DEFICIT HYPERACTIVITY DISORDER, COMBINED TYPE: Primary | Chronic | ICD-10-CM

## 2025-03-31 DIAGNOSIS — G47.00 INSOMNIA, UNSPECIFIED TYPE: Chronic | ICD-10-CM

## 2025-03-31 PROCEDURE — 96127 BRIEF EMOTIONAL/BEHAV ASSMT: CPT | Performed by: NURSE PRACTITIONER

## 2025-03-31 PROCEDURE — 99214 OFFICE O/P EST MOD 30 MIN: CPT | Performed by: NURSE PRACTITIONER

## 2025-03-31 RX ORDER — TRAZODONE HYDROCHLORIDE 50 MG/1
50 TABLET ORAL NIGHTLY
Qty: 90 TABLET | Refills: 0 | Status: SHIPPED | OUTPATIENT
Start: 2025-03-31

## 2025-03-31 NOTE — PROGRESS NOTES
This provider is located at [facility], [address] [place] [address].The Patient is seen remotely at home, [address] [place] [address] via my chart.  Patient is being seen via telehealth and confirm that they are in a secure environment for this session. The patient's condition being diagnosed/treated is appropriate for telemedicine. The provider identified himself/herself: herself as well as her credentials.   The patient gave consent to be seen remotely, and when consent is given they understand that the consent allows for patient identifiable information to be sent to a third party as needed.   They may refuse to be seen remotely at any time. The electronic data is encrypted and password protected, and the patient has been advised of the potential risks to privacy not withstanding such measures.    You have chosen to receive care through a telehealth visit.  Do you consent to use a video/audio connection for your medical care today? Yes. Patient verified Name, , and address.  No changes noted      Chief Complaint  ADHD and sleep    Subjective    Josefadonaldo Nazario presents to [facility] for medication management.    History of Present Illness  Patient presents today for her 3-month follow-up reporting things have been going good with no changes.  States focus and attention is good.  Denies any depressive symptoms.  Reports sleep is the same and appetite is good.  Denies any side effects with medications chest pain heart palpitations and dizziness or headaches.  Patient states she is slightly anxious if she does not take her medicine that day but otherwise things are all within normal limits with no concerns.  Denies any new medical or medicine changes.  Denies any SI/HI/AH/VH.      Without the medication patient has problems with attention and focus including easily distracted, easily losing objects, trouble with time  management, trouble completing tasks because of distractions, procrastination, indecisiveness, spends lots of extra time compensating for trouble with inattention/focus by making multiple to-do lists, careless mistakes at work, not finishing jobs that are started.  Patient denies any side effects, no worsening of insomnia, no worsening of anxiety on the medication dose.     Objective   Vital Signs:   There were no vitals taken for this visit.  Due to the remote nature of this encounter (virtual encounter), vitals were unable to be obtained.  Height stated at 62 inches.  Weight stated at 168 pounds.      PHQ-9 Score:   PHQ-9 Total Score:(Patient-Rptd) 2     PHQ-9 Depression Screening  Little interest or pleasure in doing things? (Patient-Rptd) Not at all   Feeling down, depressed, or hopeless? (Patient-Rptd) Not at all   PHQ-2 Total Score (Patient-Rptd) 0   Trouble falling or staying asleep, or sleeping too much? (Patient-Rptd) Several days   Feeling tired or having little energy? (Patient-Rptd) Several days   Poor appetite or overeating? (Patient-Rptd) Not at all   Feeling bad about yourself - or that you are a failure or have let yourself or your family down? (Patient-Rptd) Not at all   Trouble concentrating on things, such as reading the newspaper or watching television? (Patient-Rptd) Not at all   Moving or speaking so slowly that other people could have noticed? Or the opposite - being so fidgety or restless that you have been moving around a lot more than usual? (Patient-Rptd) Not at all   Thoughts that you would be better off dead, or of hurting yourself in some way? (Patient-Rptd) Not at all   PHQ-9 Total Score (Patient-Rptd) 2   If you checked off any problems, how difficult have these problems made it for you to do your work, take care of things at home, or get along with other people? (Patient-Rptd) Not difficult at all         PHQ-9 Total Score: (Patient-Rptd) 2     EMELYN-7  Feeling nervous, anxious or on  edge: (Patient-Rptd) Not at all  Not being able to stop or control worrying: (Patient-Rptd) Not at all  Worrying too much about different things: (Patient-Rptd) Not at all  Trouble Relaxing: (Patient-Rptd) Not at all  Being so restless that it is hard to sit still: (Patient-Rptd) Not at all  Feeling afraid as if something awful might happen: (Patient-Rptd) Not at all  Becoming easily annoyed or irritable: (Patient-Rptd) Several days  EMELYN 7 Total Score: (Patient-Rptd) 1  If you checked any problems, how difficult have these problems made it for you to do your work, take care of things at home, or get along with other people: (Patient-Rptd) Not difficult at all      Patient screened positive for depression based on a PHQ-9 score of 2 on 3/30/2025. Follow-up recommendations include:  See notes and medication list .        Mental Status Exam:   Hygiene:   good  Cooperation:  Cooperative  Eye Contact:  Good  Psychomotor Behavior:  Appropriate  Affect:  Appropriate  Mood: normal  Speech:  Normal  Thought Process:  Goal directed  Thought Content:  Normal  Suicidal:  None  Homicidal:  None  Hallucinations:  None  Delusion:  None  Memory:  Intact  Orientation:  Person, Place, Time, and Situation  Reliability:  good  Insight:  Good  Judgement:  Good  Impulse Control:  Good  Physical/Medical Issues:  No      Current Medications:   Current Outpatient Medications   Medication Sig Dispense Refill    traZODone (DESYREL) 50 MG tablet Take 1 tablet by mouth Every Night. 90 tablet 0    amphetamine-dextroamphetamine XR (Adderall XR) 20 MG 24 hr capsule Take 1 capsule by mouth Every Morning 30 capsule 0    estradiol (ESTRACE) 0.5 MG tablet       estradiol (ESTRACE) 1 MG tablet       levothyroxine (Synthroid) 150 MCG tablet Take 1 tablet by mouth Daily. Brand name only 90 tablet 3    predniSONE (DELTASONE) 20 MG tablet Take 1 tablet by mouth Daily. 15 tablet 0    Progesterone (PROMETRIUM) 100 MG capsule        No current  facility-administered medications for this visit.       Physical Exam  Nursing note reviewed.   Constitutional:       Appearance: Normal appearance.   Neurological:      Mental Status: She is alert.   Psychiatric:         Attention and Perception: Attention and perception normal.         Mood and Affect: Mood and affect normal.         Speech: Speech normal.         Behavior: Behavior normal. Behavior is cooperative.         Thought Content: Thought content normal.         Cognition and Memory: Cognition and memory normal.         Judgment: Judgment normal.        Result Review :     The following data was reviewed by: EVELINA Peace on 12/30/2024:  Common labs          4/15/2024    14:29   Common Labs   Glucose 85    BUN 15    Creatinine 0.84    Sodium 136    Potassium 4.7    Chloride 100    Calcium 9.9    Albumin 4.6    Total Bilirubin 0.3    Alkaline Phosphatase 90    AST (SGOT) 21    ALT (SGPT) 20    WBC 9.90    Hemoglobin 13.0    Hematocrit 38.8    Platelets 236    Total Cholesterol 250    Triglycerides 149    HDL Cholesterol 76    LDL Cholesterol  148    Hemoglobin A1C 5.60      CMP          4/15/2024    14:29   CMP   Glucose 85    BUN 15    Creatinine 0.84    EGFR 80.2    Sodium 136    Potassium 4.7    Chloride 100    Calcium 9.9    Total Protein 7.4    Albumin 4.6    Globulin 2.8    Total Bilirubin 0.3    Alkaline Phosphatase 90    AST (SGOT) 21    ALT (SGPT) 20    Albumin/Globulin Ratio 1.6    BUN/Creatinine Ratio 17.9      CBC          4/15/2024    14:29   CBC   WBC 9.90    RBC 4.39    Hemoglobin 13.0    Hematocrit 38.8    MCV 88.4    MCH 29.6    MCHC 33.5    RDW 12.2    Platelets 236      CBC w/diff          4/15/2024    14:29   CBC w/Diff   WBC 9.90    RBC 4.39    Hemoglobin 13.0    Hematocrit 38.8    MCV 88.4    MCH 29.6    MCHC 33.5    RDW 12.2    Platelets 236    Neutrophil Rel % 65.1    Lymphocyte Rel % 24.9    Monocyte Rel % 7.7    Eosinophil Rel % 1.6    Basophil Rel % 0.5      Lipid Panel           4/15/2024    14:29   Lipid Panel   Total Cholesterol 250    Triglycerides 149    HDL Cholesterol 76    VLDL Cholesterol 26    LDL Cholesterol  148      TSH          4/15/2024    14:29 7/8/2024    08:15   TSH   TSH 5.280  0.512      Electrolytes          4/15/2024    14:29   Electrolytes   Sodium 136    Potassium 4.7    Chloride 100    Calcium 9.9      Renal Profile          4/15/2024    14:29   Renal Profile   BUN 15    Creatinine 0.84      BMP          4/15/2024    14:29   BMP   BUN 15    Creatinine 0.84    Sodium 136    Potassium 4.7    Chloride 100    CO2 24.0    Calcium 9.9      Most Recent A1C          4/15/2024    14:29   HGBA1C Most Recent   Hemoglobin A1C 5.60        Data reviewed : PCP notes         Assessment and Plan    Problem List Items Addressed This Visit       Insomnia    Relevant Medications    traZODone (DESYREL) 50 MG tablet     Other Visit Diagnoses         Attention deficit hyperactivity disorder, combined type  (Chronic)   -  Primary    Relevant Medications    traZODone (DESYREL) 50 MG tablet              Encounter Diagnoses   Name Primary?    Insomnia, unspecified type     Attention deficit hyperactivity disorder, combined type Yes      I have reviewed the patient and the results are consistent with the previously documented exam by myself.  I have reviewed the following portions of the patient's ROS and PFSH and confirmed them as accurate.  The HPI has been updated, chief of complaint, ROS and subjective have been reviewed and up-to-date.  The following portions of the patient's notes were reviewed, confirmed and/or updated this visit as appropriate: History of present illness/Interval history, physical examination, assessment and plan, allergies, current medications, past family medical history, past medical history, past social history, past surgical history and problem list.        TREATMENT PLAN/GOALS: Continue supportive psychotherapy efforts and medications as indicated.  "Treatment and medication options discussed during today's visit. Patient ackowledged and verbally consented to continue with current treatment plan and was educated on the importance of compliance with treatment and follow-up appointments.    MEDICATION ISSUES:  We discussed risks, benefits, and side effects of the above medications and the patient was agreeable with the plan. Patient was educated on the importance of compliance with treatment and follow-up appointments.  Patient is agreeable to call the office with any worsening of symptoms or onset of side effects. Patient is agreeable to call 911 or go to the nearest ER should he/she begin having SI/HI.     -Continue Adderall XR 20 mg daily for ADHD symptoms.  -Continue trazodone 50 mg at night as needed for sleep.  -Report # : [id number]. Reggie Reviewed. UDS up to date.     Controlled Substance Medication Contract    Controlled substance medications (i.e. benzodiazepines, opioids, amphetamines) are very useful, but have a high potential for misuse and are, therefore, closely controlled by local, state, and federal government(s). As a patient of [facility], you agree and understand the followin. I am responsible for the controlled substance medications prescribed to me. If my prescription is misplaced, stolen, or if \"I run out early,\" I understand this medication will not be replaced regardless of the circumstances.  2. Refills of controlled substance medications: (a) Will be made only during regular office hours Monday-Thursday once a month and during a scheduled virtual appointment. Refills will not be made at night, weekends, or on holidays. (b) Will not be made if \"I lost my prescriptions,\" \"ran out early,\" or \"misplaced my medication\". I am solely responsible for taking the medication as prescribed and for keeping track of the remaining.   3. I agree to comply with urine drug testing and pill counts at the provider's " discretion, thereby, documenting the proper use of any medications. If alcohol abuse is suspected, a breathalyzer or blood alcohol level may be ordered. Unannounced urine or serum toxicology specimens may be requested and my cooperation is required.  4. I understand that if I violate any of the above conditions, my prescriptions for controlled medications my be terminated. If the violation involves obtaining these medications from another individual, or the concomitant use of non-prescription illicit (illegal) drugs, I may also be reported to other providers, pharmacies, medical facilities and the appropriate authorities.   5. I further understand that if I violate this controlled substance contract due to non-compliance of medical directions, such as the failure in taking medications as prescribed, utilizing other illicit drugs, or abuse of controlled medications, the prescription for controlled medications may be terminated.   6. I agree to keep my scheduled appointments and conduct myself in a courteous manner.  7. I agree not to sell, share, or give any medication to another person. I understand that such mishandling of my medication is a serious violation of this agreement and would result in my treatment being terminated without any recourse for appeal.   8. I agree not to take my medication from any physicians, nurse practitioners, pharmacies or other sources without telling my prescriber.  9. I agree to take my medication as my prescriber has instructed and not to alter the way I take my medication without first consulting my prescriber.  10. I agree to abstain from problematic alcohol usage, opioids, marijuana, cocaine, and other addictive substances.   11. If I am legally involved related to legal or illegal drugs, including alcohol, refill of controlled substances will not be given until a re-evaluation of my chemical dependency treatment plan has been completed. Refills are at the discretion of the  prescriber.   12. I agree to fill all of my controlled medications at an in-state ([place]) pharmacy.   13. I understand that [facility] utilizes the [place] All Schedule Prescription Electronic Reporting (VIOLET) system and will monitor my prescription history via this source.  14. Benzodiazepines are drugs prescribed to treat conditions like anxiety, insomnia, and seizures. Examples of these drugs include: alprazolam, clonazepam, diazepam, and lorazepam. The FDA has applied a Black Box Warning (one of the strictest warnings) that the use of opioids and benzodiazepines together have serious risks to include unusual dizziness or lightheadedness, extreme sleepiness, slowed or difficult breathing, coma and death. There is an added risk if alcohol is also ingested. It is the policy of [facility] to NOT prescribe benzodiazepines to patients who also use opioids. If a patient already presents already prescribed both, the prescriber my direct the patient to their previous provider who prescribed it or taper the benzodiazepine as part of the treatment plan. These patients must be monitored at appropriate intervals and so visits may be more frequent.     This APRN has discussed and reviewed this information with the patient and/or guardian. The patient and/or guardian verbally agreed (no signatures are obtained during today's visit as they are a telehealth patient and is unable to print and sign this document, therefore, verbal agreement has been obtained).        Counseled patient regarding multimodal approach with healthy nutrition, healthy sleep, regular physical activity, social activities, counseling, and medications.      Coping skills reviewed and encouraged positive framing of thoughts     Assisted patient in processing above session content; acknowledged and normalized patient’s thoughts, feelings, and concerns.  Applied  positive coping skills and  behavior management in session.  Allowed patient to freely discuss issues without interruption or judgment. Provided safe, confidential environment to facilitate the development of positive therapeutic relationship and encourage open, honest communication. Assisted patient in identifying risk factors which would indicate the need for higher level of care including thoughts to harm self or others and/or self-harming behavior and encouraged patient to contact this office, call 911, or present to the nearest emergency room should any of these events occur. Discussed crisis intervention services and means to access.     MEDS ORDERED DURING VISIT:  New Medications Ordered This Visit   Medications    traZODone (DESYREL) 50 MG tablet     Sig: Take 1 tablet by mouth Every Night.     Dispense:  90 tablet     Refill:  0           Follow Up   Return in about 3 months (around 6/30/2025), or if symptoms worsen or fail to improve, for Recheck.    Patient was given instructions and counseling regarding her condition or for health maintenance advice. Please see specific information pulled into the AVS if appropriate.     Some of the data in this electronic note has been brought forward from a previous encounter, any necessary changes have been made, it has been reviewed by this APRN, and it is accurate.      This document has been electronically signed by EVELINA Peace  March 31, 2025 08:43 EDT    Part of this note may be an electronic transcription/translation of spoken language to printed text using the Dragon Dictation System.

## 2025-04-29 ENCOUNTER — TELEPHONE (OUTPATIENT)
Dept: PSYCHIATRY | Facility: CLINIC | Age: 60
End: 2025-04-29
Payer: COMMERCIAL

## 2025-04-29 DIAGNOSIS — F90.2 ATTENTION DEFICIT HYPERACTIVITY DISORDER, COMBINED TYPE: Chronic | ICD-10-CM

## 2025-04-29 RX ORDER — DEXTROAMPHETAMINE SACCHARATE, AMPHETAMINE ASPARTATE MONOHYDRATE, DEXTROAMPHETAMINE SULFATE AND AMPHETAMINE SULFATE 5; 5; 5; 5 MG/1; MG/1; MG/1; MG/1
20 CAPSULE, EXTENDED RELEASE ORAL EVERY MORNING
Qty: 30 CAPSULE | Refills: 0 | Status: SHIPPED | OUTPATIENT
Start: 2025-04-29

## 2025-04-29 NOTE — TELEPHONE ENCOUNTER
Left pt a vm to call our office back and verify her email.  Pt provider is needing a Controlled Substance Agreement emailed to the pt and for them to sign and send back to our office.

## 2025-05-01 NOTE — TELEPHONE ENCOUNTER
Called pt x2 left a vm asking her to call the office back so we can verify the pt email so we can send a controlled substance form to her to sign and send back to our office.

## 2025-07-08 ENCOUNTER — TELEMEDICINE (OUTPATIENT)
Dept: PSYCHIATRY | Facility: CLINIC | Age: 60
End: 2025-07-08
Payer: COMMERCIAL

## 2025-07-08 DIAGNOSIS — G47.00 INSOMNIA, UNSPECIFIED TYPE: Chronic | ICD-10-CM

## 2025-07-08 DIAGNOSIS — F90.2 ATTENTION DEFICIT HYPERACTIVITY DISORDER, COMBINED TYPE: Chronic | ICD-10-CM

## 2025-07-08 PROBLEM — M19.90 ARTHRITIS: Status: ACTIVE | Noted: 2025-07-08

## 2025-07-08 PROCEDURE — 99214 OFFICE O/P EST MOD 30 MIN: CPT | Performed by: NURSE PRACTITIONER

## 2025-07-08 RX ORDER — ESTRADIOL 0.1 MG/D
PATCH TRANSDERMAL
COMMUNITY
Start: 2025-05-20

## 2025-07-08 RX ORDER — TRAZODONE HYDROCHLORIDE 50 MG/1
50 TABLET ORAL NIGHTLY
Qty: 90 TABLET | Refills: 0 | Status: SHIPPED | OUTPATIENT
Start: 2025-07-08

## 2025-07-08 RX ORDER — DEXTROAMPHETAMINE SACCHARATE, AMPHETAMINE ASPARTATE MONOHYDRATE, DEXTROAMPHETAMINE SULFATE AND AMPHETAMINE SULFATE 5; 5; 5; 5 MG/1; MG/1; MG/1; MG/1
20 CAPSULE, EXTENDED RELEASE ORAL EVERY MORNING
Qty: 30 CAPSULE | Refills: 0 | Status: SHIPPED | OUTPATIENT
Start: 2025-07-08

## 2025-07-08 NOTE — PROGRESS NOTES
This provider is located at [facility], [address] [place] [address].The Patient is seen remotely at home, [address] [place] [address] via my chart.  Patient is being seen via telehealth and confirm that they are in a secure environment for this session. The patient's condition being diagnosed/treated is appropriate for telemedicine. The provider identified himself/herself: herself as well as her credentials.   The patient gave consent to be seen remotely, and when consent is given they understand that the consent allows for patient identifiable information to be sent to a third party as needed.   They may refuse to be seen remotely at any time. The electronic data is encrypted and password protected, and the patient has been advised of the potential risks to privacy not withstanding such measures.    You have chosen to receive care through a telehealth visit.  Do you consent to use a video/audio connection for your medical care today? Yes. Patient verified Name, , and address.  No changes noted      Chief Complaint  ADHD and sleep    Subjective    Josefa Nazario presents to [facility] for medication management.    History of Present Illness  Patient presents today reporting that she is doing well.  States her focus and attention is good as she just uses the Adderall for work and able to get her work done and stay focused and pay attention.  Notes she is sleeping good as she uses the trazodone as needed.  Reports appetite is good.  Denies any depressive or anxiety symptoms.  Reports she is on day estradiol patch of the tablets otherwise denies any medical or medicine changes.  Denies any side effects or concerns or issues at this time.  Denies any SI/HI/AH/VH.       Without the medication patient has problems with attention and focus including easily distracted, easily losing objects, trouble with time management, trouble  completing tasks because of distractions, procrastination, indecisiveness, spends lots of extra time compensating for trouble with inattention/focus by making multiple to-do lists, careless mistakes at work, not finishing jobs that are started.  Patient denies any side effects, no worsening of insomnia, no worsening of anxiety on the medication dose.     Objective   Vital Signs:   There were no vitals taken for this visit.  Due to the remote nature of this encounter (virtual encounter), vitals were unable to be obtained.  Height stated at 62 inches.  Weight stated at 168 pounds.      PHQ-9 Score:   PHQ-9 Total Score:      PHQ-9 Depression Screening  Little interest or pleasure in doing things?     Feeling down, depressed, or hopeless?     PHQ-2 Total Score     Trouble falling or staying asleep, or sleeping too much?     Feeling tired or having little energy?     Poor appetite or overeating?     Feeling bad about yourself - or that you are a failure or have let yourself or your family down?     Trouble concentrating on things, such as reading the newspaper or watching television?     Moving or speaking so slowly that other people could have noticed? Or the opposite - being so fidgety or restless that you have been moving around a lot more than usual?     Thoughts that you would be better off dead, or of hurting yourself in some way?     PHQ-9 Total Score     If you checked off any problems, how difficult have these problems made it for you to do your work, take care of things at home, or get along with other people?           PHQ-9 Total Score:       EMELYN-7         Patient screened positive for depression based on a PHQ-9 score of 2 on 3/30/2025. Follow-up recommendations include: See notes and medication list.        Mental Status Exam:   Hygiene:   good  Cooperation:  Cooperative  Eye Contact:  Good  Psychomotor Behavior:  Appropriate  Affect:  Appropriate  Mood: normal  Speech:  Normal  Thought Process:  Goal  directed  Thought Content:  Normal  Suicidal:  None  Homicidal:  None  Hallucinations:  None  Delusion:  None  Memory:  Intact  Orientation:  Person, Place, Time, and Situation  Reliability:  good  Insight:  Good  Judgement:  Good  Impulse Control:  Good  Physical/Medical Issues:  No      Current Medications:   Current Outpatient Medications   Medication Sig Dispense Refill    amphetamine-dextroamphetamine XR (Adderall XR) 20 MG 24 hr capsule Take 1 capsule by mouth Every Morning 30 capsule 0    estradiol (CLIMARA) 0.1 MG/24HR patch       traZODone (DESYREL) 50 MG tablet Take 1 tablet by mouth Every Night. 90 tablet 0    levothyroxine (Synthroid) 150 MCG tablet Take 1 tablet by mouth Daily. Brand name only 90 tablet 3    predniSONE (DELTASONE) 20 MG tablet Take 1 tablet by mouth Daily. 15 tablet 0    Progesterone (PROMETRIUM) 100 MG capsule        No current facility-administered medications for this visit.       Physical Exam  Nursing note reviewed.   Constitutional:       Appearance: Normal appearance.   Neurological:      Mental Status: She is alert.   Psychiatric:         Attention and Perception: Attention and perception normal.         Mood and Affect: Mood and affect normal.         Speech: Speech normal.         Behavior: Behavior normal. Behavior is cooperative.         Thought Content: Thought content normal.         Cognition and Memory: Cognition and memory normal.         Judgment: Judgment normal.        Result Review :     The following data was reviewed by: EVELINA Peace on 12/30/2024:              CBC w/diff          4/15/2024    14:29   CBC w/Diff   WBC 9.90    RBC 4.39    Hemoglobin 13.0    Hematocrit 38.8    MCV 88.4    MCH 29.6    MCHC 33.5    RDW 12.2    Platelets 236    Neutrophil Rel % 65.1    Lymphocyte Rel % 24.9    Monocyte Rel % 7.7    Eosinophil Rel % 1.6    Basophil Rel % 0.5                                Data reviewed : PCP notes        Assessment and Plan    Problem List  Items Addressed This Visit       Insomnia    Relevant Medications    traZODone (DESYREL) 50 MG tablet     Other Visit Diagnoses         Attention deficit hyperactivity disorder, combined type  (Chronic)       Relevant Medications    amphetamine-dextroamphetamine XR (Adderall XR) 20 MG 24 hr capsule    traZODone (DESYREL) 50 MG tablet    Other Relevant Orders    Urine Drug Screen - Urine, Clean Catch                Encounter Diagnoses   Name Primary?    Attention deficit hyperactivity disorder, combined type     Insomnia, unspecified type      I have reviewed the patient and the results are consistent with the previously documented exam by myself.  I have reviewed the following portions of the patient's ROS and PFSH and confirmed them as accurate.  The HPI has been updated, chief of complaint, ROS and subjective have been reviewed and up-to-date.  The following portions of the patient's notes were reviewed, confirmed and/or updated this visit as appropriate: History of present illness/Interval history, physical examination, assessment and plan, allergies, current medications, past family medical history, past medical history, past social history, past surgical history and problem list.        TREATMENT PLAN/GOALS: Continue supportive psychotherapy efforts and medications as indicated. Treatment and medication options discussed during today's visit. Patient ackowledged and verbally consented to continue with current treatment plan and was educated on the importance of compliance with treatment and follow-up appointments.    MEDICATION ISSUES:  We discussed risks, benefits, and side effects of the above medications and the patient was agreeable with the plan. Patient was educated on the importance of compliance with treatment and follow-up appointments.  Patient is agreeable to call the office with any worsening of symptoms or onset of side effects. Patient is agreeable to call 911 or go to the nearest ER should he/she  "begin having SI/HI.     -Continue Adderall XR 20 mg daily for ADHD symptoms.  -Continue trazodone 50 mg at night as needed for sleep.  - Reggie Reviewed. UDS ordered.     Controlled Substance Medication Contract    Controlled substance medications (i.e. benzodiazepines, opioids, amphetamines) are very useful, but have a high potential for misuse and are, therefore, closely controlled by local, state, and federal government(s). As a patient of [facility], you agree and understand the followin. I am responsible for the controlled substance medications prescribed to me. If my prescription is misplaced, stolen, or if \"I run out early,\" I understand this medication will not be replaced regardless of the circumstances.  2. Refills of controlled substance medications: (a) Will be made only during regular office hours Monday-Thursday once a month and during a scheduled virtual appointment. Refills will not be made at night, weekends, or on holidays. (b) Will not be made if \"I lost my prescriptions,\" \"ran out early,\" or \"misplaced my medication\". I am solely responsible for taking the medication as prescribed and for keeping track of the remaining.   3. I agree to comply with urine drug testing and pill counts at the provider's discretion, thereby, documenting the proper use of any medications. If alcohol abuse is suspected, a breathalyzer or blood alcohol level may be ordered. Unannounced urine or serum toxicology specimens may be requested and my cooperation is required.  4. I understand that if I violate any of the above conditions, my prescriptions for controlled medications my be terminated. If the violation involves obtaining these medications from another individual, or the concomitant use of non-prescription illicit (illegal) drugs, I may also be reported to other providers, pharmacies, medical facilities and the appropriate authorities.   5. I further understand that if I violate " this controlled substance contract due to non-compliance of medical directions, such as the failure in taking medications as prescribed, utilizing other illicit drugs, or abuse of controlled medications, the prescription for controlled medications may be terminated.   6. I agree to keep my scheduled appointments and conduct myself in a courteous manner.  7. I agree not to sell, share, or give any medication to another person. I understand that such mishandling of my medication is a serious violation of this agreement and would result in my treatment being terminated without any recourse for appeal.   8. I agree not to take my medication from any physicians, nurse practitioners, pharmacies or other sources without telling my prescriber.  9. I agree to take my medication as my prescriber has instructed and not to alter the way I take my medication without first consulting my prescriber.  10. I agree to abstain from problematic alcohol usage, opioids, marijuana, cocaine, and other addictive substances.   11. If I am legally involved related to legal or illegal drugs, including alcohol, refill of controlled substances will not be given until a re-evaluation of my chemical dependency treatment plan has been completed. Refills are at the discretion of the prescriber.   12. I agree to fill all of my controlled medications at an in-[facility] ([place]) pharmacy.   13. I understand that [facility] utilizes the [facility] Prescription Electronic Reporting (VIOLET) system and will monitor my prescription history via this source.  14. Benzodiazepines are drugs prescribed to treat conditions like anxiety, insomnia, and seizures. Examples of these drugs include: alprazolam, clonazepam, diazepam, and lorazepam. The FDA has applied a Black Box Warning (one of the strictest warnings) that the use of opioids and benzodiazepines together have serious risks to include unusual dizziness or  lightheadedness, extreme sleepiness, slowed or difficult breathing, coma and death. There is an added risk if alcohol is also ingested. It is the policy of [facility] to NOT prescribe benzodiazepines to patients who also use opioids. If a patient already presents already prescribed both, the prescriber my direct the patient to their previous provider who prescribed it or taper the benzodiazepine as part of the treatment plan. These patients must be monitored at appropriate intervals and so visits may be more frequent.     This APRN has discussed and reviewed this information with the patient and/or guardian. The patient and/or guardian verbally agreed (no signatures are obtained during today's visit as they are a telehealth patient and is unable to print and sign this document, therefore, verbal agreement has been obtained).        Counseled patient regarding multimodal approach with healthy nutrition, healthy sleep, regular physical activity, social activities, counseling, and medications.      Coping skills reviewed and encouraged positive framing of thoughts     Assisted patient in processing above session content; acknowledged and normalized patient’s thoughts, feelings, and concerns.  Applied  positive coping skills and behavior management in session.  Allowed patient to freely discuss issues without interruption or judgment. Provided safe, confidential environment to facilitate the development of positive therapeutic relationship and encourage open, honest communication. Assisted patient in identifying risk factors which would indicate the need for higher level of care including thoughts to harm self or others and/or self-harming behavior and encouraged patient to contact this office, call 911, or present to the nearest emergency room should any of these events occur. Discussed crisis intervention services and means to access.     MEDS ORDERED DURING VISIT:  New Medications  Ordered This Visit   Medications    amphetamine-dextroamphetamine XR (Adderall XR) 20 MG 24 hr capsule     Sig: Take 1 capsule by mouth Every Morning     Dispense:  30 capsule     Refill:  0     Generic for insurance    traZODone (DESYREL) 50 MG tablet     Sig: Take 1 tablet by mouth Every Night.     Dispense:  90 tablet     Refill:  0           Follow Up   Return in about 3 months (around 10/8/2025), or if symptoms worsen or fail to improve, for Recheck.    Patient was given instructions and counseling regarding her condition or for health maintenance advice. Please see specific information pulled into the AVS if appropriate.     Some of the data in this electronic note has been brought forward from a previous encounter, any necessary changes have been made, it has been reviewed by this APRN, and it is accurate.      This document has been electronically signed by EVELINA Peace  July 8, 2025 14:16 EDT    Part of this note may be an electronic transcription/translation of spoken language to printed text using the Dragon Dictation System.

## 2025-07-21 RX ORDER — LEVOTHYROXINE SODIUM 150 MCG
150 TABLET ORAL DAILY
Qty: 90 TABLET | Refills: 3 | OUTPATIENT
Start: 2025-07-21

## 2025-07-25 RX ORDER — LEVOTHYROXINE SODIUM 150 MCG
150 TABLET ORAL DAILY
Qty: 30 TABLET | Refills: 0 | Status: SHIPPED | OUTPATIENT
Start: 2025-07-25

## 2025-07-25 NOTE — TELEPHONE ENCOUNTER
Caller: Mansi Josefa DELMA    Relationship: Self    Best call back number: [phone]     Requested Prescriptions:   Requested Prescriptions     Pending Prescriptions Disp Refills    Synthroid 150 MCG tablet [Pharmacy Med Name: SYNTHROID 150 MCG TABLET] 90 tablet 3     Sig: TAKE 1 TABLET BY MOUTH DAILY        Pharmacy where request should be sent: [facility] [id number] [place] [id number] [facility]  AT [address] [phone] [facility] [phone]      Last office visit with prescribing clinician: 4/15/2024   Last telemedicine visit with prescribing clinician: Visit date not found   Next office visit with prescribing clinician: 8/11/2025     Additional details provided by patient: PATIENT STATED SHE IS CURRENTLY OUT OF THIS MEDICATION AND NEEDS THIS REFILLED ASAP.    PATIENT HAS SCHEDULED FOR SOONEST AVAILABLE ON 08- BUT NEED A REFILL PRIOR TO THIS VISIT.    Does the patient have less than a 3 day supply:  [x] Yes  [] No    Would you like a call back once the refill request has been completed: [] Yes [x] No    If the office needs to give you a call back, can they leave a voicemail: [] Yes [x] No    Karyn Pierre Rep   07/25/25 09:54 EDT

## 2025-07-25 NOTE — TELEPHONE ENCOUNTER
Rx Refill Note  Requested Prescriptions     Pending Prescriptions Disp Refills    Synthroid 150 MCG tablet [Pharmacy Med Name: SYNTHROID 150 MCG TABLET] 90 tablet 3     Sig: TAKE 1 TABLET BY MOUTH DAILY      Last office visit with prescribing clinician: 4/15/2024   Last telemedicine visit with prescribing clinician: Visit date not found   Next office visit with prescribing clinician: 8/11/2025                         Would you like a call back once the refill request has been completed: [] Yes [] No    If the office needs to give you a call back, can they leave a voicemail: [] Yes [] No    Tonya Templeton MA  07/25/25, 10:17 EDT

## 2025-08-11 ENCOUNTER — OFFICE VISIT (OUTPATIENT)
Dept: FAMILY MEDICINE CLINIC | Facility: CLINIC | Age: 60
End: 2025-08-11
Payer: COMMERCIAL

## 2025-08-11 VITALS
TEMPERATURE: 97.2 F | OXYGEN SATURATION: 99 % | WEIGHT: 167 LBS | DIASTOLIC BLOOD PRESSURE: 78 MMHG | BODY MASS INDEX: 30.73 KG/M2 | HEART RATE: 76 BPM | HEIGHT: 62 IN | SYSTOLIC BLOOD PRESSURE: 130 MMHG

## 2025-08-11 DIAGNOSIS — N95.1 MENOPAUSAL SYMPTOMS: ICD-10-CM

## 2025-08-11 DIAGNOSIS — Z13.0 SCREENING FOR DEFICIENCY ANEMIA: ICD-10-CM

## 2025-08-11 DIAGNOSIS — E03.9 HYPOTHYROIDISM, UNSPECIFIED TYPE: Primary | ICD-10-CM

## 2025-08-11 DIAGNOSIS — M25.50 ARTHRALGIA, UNSPECIFIED JOINT: ICD-10-CM

## 2025-08-11 DIAGNOSIS — M25.551 PAIN OF RIGHT HIP: ICD-10-CM

## 2025-08-11 DIAGNOSIS — Z13.6 SCREENING FOR ISCHEMIC HEART DISEASE: ICD-10-CM

## 2025-08-11 DIAGNOSIS — R14.0 BLOATING: ICD-10-CM

## 2025-08-11 PROCEDURE — 99214 OFFICE O/P EST MOD 30 MIN: CPT | Performed by: STUDENT IN AN ORGANIZED HEALTH CARE EDUCATION/TRAINING PROGRAM

## 2025-08-11 RX ORDER — PREDNISONE 20 MG/1
20 TABLET ORAL DAILY
Qty: 15 TABLET | Refills: 0 | Status: SHIPPED | OUTPATIENT
Start: 2025-08-11

## 2025-08-11 RX ORDER — PROGESTERONE 100 MG/1
100 CAPSULE ORAL DAILY
Qty: 90 CAPSULE | Refills: 3 | Status: SHIPPED | OUTPATIENT
Start: 2025-08-11

## 2025-08-11 RX ORDER — LEVOTHYROXINE SODIUM 150 MCG
150 TABLET ORAL DAILY
Qty: 30 TABLET | Refills: 0 | Status: SHIPPED | OUTPATIENT
Start: 2025-08-11

## 2025-08-27 ENCOUNTER — HOSPITAL ENCOUNTER (OUTPATIENT)
Facility: HOSPITAL | Age: 60
Discharge: HOME | End: 2025-08-27
Payer: COMMERCIAL

## 2025-08-27 ENCOUNTER — LAB (OUTPATIENT)
Facility: HOSPITAL | Age: 60
End: 2025-08-27
Payer: COMMERCIAL

## 2025-08-27 ENCOUNTER — TELEPHONE (OUTPATIENT)
Dept: PSYCHIATRY | Facility: CLINIC | Age: 60
End: 2025-08-27
Payer: COMMERCIAL

## 2025-08-27 DIAGNOSIS — M25.551 PAIN OF RIGHT HIP: ICD-10-CM

## 2025-08-27 DIAGNOSIS — Z13.0 SCREENING FOR DEFICIENCY ANEMIA: ICD-10-CM

## 2025-08-27 DIAGNOSIS — Z13.6 SCREENING FOR ISCHEMIC HEART DISEASE: ICD-10-CM

## 2025-08-27 LAB
ALBUMIN SERPL-MCNC: 4.3 G/DL (ref 3.5–5.2)
ALBUMIN/GLOB SERPL: 1.4 G/DL
ALP SERPL-CCNC: 80 U/L (ref 39–117)
ALT SERPL W P-5'-P-CCNC: 19 U/L (ref 1–33)
ANION GAP SERPL CALCULATED.3IONS-SCNC: 12 MMOL/L (ref 5–15)
AST SERPL-CCNC: 21 U/L (ref 1–32)
BASOPHILS # BLD AUTO: 0.04 10*3/MM3 (ref 0–0.2)
BASOPHILS NFR BLD AUTO: 0.5 % (ref 0–1.5)
BILIRUB SERPL-MCNC: 0.6 MG/DL (ref 0–1.2)
BUN SERPL-MCNC: 11 MG/DL (ref 8–23)
BUN/CREAT SERPL: 16.2 (ref 7–25)
CALCIUM SPEC-SCNC: 9.3 MG/DL (ref 8.6–10.5)
CHLORIDE SERPL-SCNC: 104 MMOL/L (ref 98–107)
CHOLEST SERPL-MCNC: 226 MG/DL (ref 0–200)
CO2 SERPL-SCNC: 22 MMOL/L (ref 22–29)
CREAT SERPL-MCNC: 0.68 MG/DL (ref 0.57–1)
DEPRECATED RDW RBC AUTO: 44.8 FL (ref 37–54)
EGFRCR SERPLBLD CKD-EPI 2021: 99.2 ML/MIN/1.73
EOSINOPHIL # BLD AUTO: 0.07 10*3/MM3 (ref 0–0.4)
EOSINOPHIL NFR BLD AUTO: 0.9 % (ref 0.3–6.2)
ERYTHROCYTE [DISTWIDTH] IN BLOOD BY AUTOMATED COUNT: 13.1 % (ref 12.3–15.4)
GLOBULIN UR ELPH-MCNC: 3 GM/DL
GLUCOSE SERPL-MCNC: 93 MG/DL (ref 65–99)
HBA1C MFR BLD: 5.4 % (ref 4.8–5.6)
HCT VFR BLD AUTO: 39.6 % (ref 34–46.6)
HDLC SERPL-MCNC: 66 MG/DL (ref 40–60)
HGB BLD-MCNC: 12.8 G/DL (ref 12–15.9)
IMM GRANULOCYTES # BLD AUTO: 0.01 10*3/MM3 (ref 0–0.05)
IMM GRANULOCYTES NFR BLD AUTO: 0.1 % (ref 0–0.5)
LDLC SERPL CALC-MCNC: 137 MG/DL (ref 0–100)
LDLC/HDLC SERPL: 2.03 {RATIO}
LYMPHOCYTES # BLD AUTO: 2.77 10*3/MM3 (ref 0.7–3.1)
LYMPHOCYTES NFR BLD AUTO: 35.2 % (ref 19.6–45.3)
MCH RBC QN AUTO: 30.3 PG (ref 26.6–33)
MCHC RBC AUTO-ENTMCNC: 32.3 G/DL (ref 31.5–35.7)
MCV RBC AUTO: 93.6 FL (ref 79–97)
MONOCYTES # BLD AUTO: 0.65 10*3/MM3 (ref 0.1–0.9)
MONOCYTES NFR BLD AUTO: 8.3 % (ref 5–12)
NEUTROPHILS NFR BLD AUTO: 4.32 10*3/MM3 (ref 1.7–7)
NEUTROPHILS NFR BLD AUTO: 55 % (ref 42.7–76)
NRBC BLD AUTO-RTO: 0 /100 WBC (ref 0–0.2)
PLATELET # BLD AUTO: 218 10*3/MM3 (ref 140–450)
PMV BLD AUTO: 11.8 FL (ref 6–12)
POTASSIUM SERPL-SCNC: 4 MMOL/L (ref 3.5–5.2)
PROT SERPL-MCNC: 7.3 G/DL (ref 6–8.5)
RBC # BLD AUTO: 4.23 10*6/MM3 (ref 3.77–5.28)
SODIUM SERPL-SCNC: 138 MMOL/L (ref 136–145)
TRIGL SERPL-MCNC: 129 MG/DL (ref 0–150)
TSH SERPL DL<=0.05 MIU/L-ACNC: 0.54 UIU/ML (ref 0.27–4.2)
VLDLC SERPL-MCNC: 23 MG/DL (ref 5–40)
WBC NRBC COR # BLD AUTO: 7.86 10*3/MM3 (ref 3.4–10.8)

## 2025-08-27 PROCEDURE — 80050 GENERAL HEALTH PANEL: CPT

## 2025-08-27 PROCEDURE — 80061 LIPID PANEL: CPT

## 2025-08-27 PROCEDURE — 36415 COLL VENOUS BLD VENIPUNCTURE: CPT

## 2025-08-27 PROCEDURE — 73502 X-RAY EXAM HIP UNI 2-3 VIEWS: CPT

## 2025-08-27 PROCEDURE — 83036 HEMOGLOBIN GLYCOSYLATED A1C: CPT

## (undated) DEVICE — SNAR POLYP SENSATION STDOVL 27 240 BX40

## (undated) DEVICE — TUBING, SUCTION, 1/4" X 10', STRAIGHT: Brand: MEDLINE

## (undated) DEVICE — SINGLE-USE BIOPSY FORCEPS: Brand: RADIAL JAW 4

## (undated) DEVICE — SENSR O2 OXIMAX FNGR A/ 18IN NONSTR

## (undated) DEVICE — KT ORCA ORCAPOD DISP STRL

## (undated) DEVICE — CANN O2 ETCO2 FITS ALL CONN CO2 SMPL A/ 7IN DISP LF

## (undated) DEVICE — LN SMPL CO2 SHTRM SD STREAM W/M LUER

## (undated) DEVICE — PATIENT RETURN ELECTRODE, SINGLE-USE, CONTACT QUALITY MONITORING, ADULT, WITH 9FT CORD, FOR PATIENTS WEIGING OVER 33LBS. (15KG): Brand: MEGADYNE

## (undated) DEVICE — THE SINGLE USE ETRAP – POLYP TRAP IS USED FOR SUCTION RETRIEVAL OF ENDOSCOPICALLY REMOVED POLYPS.: Brand: ETRAP

## (undated) DEVICE — ADAPT CLN BIOGUARD AIR/H2O DISP